# Patient Record
Sex: MALE | Race: WHITE | NOT HISPANIC OR LATINO | Employment: OTHER | URBAN - METROPOLITAN AREA
[De-identification: names, ages, dates, MRNs, and addresses within clinical notes are randomized per-mention and may not be internally consistent; named-entity substitution may affect disease eponyms.]

---

## 2018-03-27 ENCOUNTER — HOSPITAL ENCOUNTER (INPATIENT)
Facility: HOSPITAL | Age: 43
LOS: 7 days | Discharge: PSYCHIATRIC OR SUBSTANCE USE FACILITY | DRG: 917 | End: 2018-04-03
Attending: INTERNAL MEDICINE | Admitting: INTERNAL MEDICINE
Payer: MEDICAID

## 2018-03-27 DIAGNOSIS — B18.2 CHRONIC HEPATITIS C WITHOUT HEPATIC COMA: ICD-10-CM

## 2018-03-27 DIAGNOSIS — R56.9 SEIZURES: ICD-10-CM

## 2018-03-27 DIAGNOSIS — E87.20 METABOLIC ACIDOSIS: ICD-10-CM

## 2018-03-27 DIAGNOSIS — A41.9 SEVERE SEPSIS: ICD-10-CM

## 2018-03-27 DIAGNOSIS — R41.82 ALTERED MENTAL STATUS, UNSPECIFIED ALTERED MENTAL STATUS TYPE: ICD-10-CM

## 2018-03-27 DIAGNOSIS — J96.00 ACUTE RESPIRATORY FAILURE, UNSPECIFIED WHETHER WITH HYPOXIA OR HYPERCAPNIA: ICD-10-CM

## 2018-03-27 DIAGNOSIS — F19.10 POLYSUBSTANCE ABUSE: ICD-10-CM

## 2018-03-27 DIAGNOSIS — R41.82 ALTERED MENTAL STATUS: ICD-10-CM

## 2018-03-27 DIAGNOSIS — R65.20 SEVERE SEPSIS: ICD-10-CM

## 2018-03-27 DIAGNOSIS — F31.9 BIPOLAR AFFECTIVE DISORDER, REMISSION STATUS UNSPECIFIED: Primary | ICD-10-CM

## 2018-03-27 DIAGNOSIS — R41.89 UNRESPONSIVE: ICD-10-CM

## 2018-03-27 LAB
ALBUMIN SERPL BCP-MCNC: 3.3 G/DL
ALLENS TEST: ABNORMAL
ALP SERPL-CCNC: 61 U/L
ALT SERPL W/O P-5'-P-CCNC: 22 U/L
AMMONIA PLAS-SCNC: 48 UMOL/L
ANION GAP SERPL CALC-SCNC: 9 MMOL/L
AST SERPL-CCNC: 35 U/L
BASOPHILS # BLD AUTO: ABNORMAL K/UL
BASOPHILS NFR BLD: 0 %
BILIRUB SERPL-MCNC: 2.7 MG/DL
BUN SERPL-MCNC: 8 MG/DL
CALCIUM SERPL-MCNC: 8.2 MG/DL
CHLORIDE SERPL-SCNC: 112 MMOL/L
CO2 SERPL-SCNC: 17 MMOL/L
CREAT SERPL-MCNC: 0.8 MG/DL
DELSYS: ABNORMAL
DIFFERENTIAL METHOD: ABNORMAL
EOSINOPHIL # BLD AUTO: ABNORMAL K/UL
EOSINOPHIL NFR BLD: 1 %
ERYTHROCYTE [DISTWIDTH] IN BLOOD BY AUTOMATED COUNT: 13.8 %
ERYTHROCYTE [SEDIMENTATION RATE] IN BLOOD BY WESTERGREN METHOD: 24 MM/H
EST. GFR  (AFRICAN AMERICAN): >60 ML/MIN/1.73 M^2
EST. GFR  (NON AFRICAN AMERICAN): >60 ML/MIN/1.73 M^2
ETCO2: 23
FIO2: 100
GLUCOSE SERPL-MCNC: 107 MG/DL
HCO3 UR-SCNC: 23 MMOL/L (ref 24–28)
HCT VFR BLD AUTO: 40.2 %
HGB BLD-MCNC: 13.9 G/DL
LACTATE SERPL-SCNC: 1.3 MMOL/L
LIPASE SERPL-CCNC: 31 U/L
LYMPHOCYTES # BLD AUTO: ABNORMAL K/UL
LYMPHOCYTES NFR BLD: 10 %
MCH RBC QN AUTO: 30 PG
MCHC RBC AUTO-ENTMCNC: 34.7 G/DL
MCV RBC AUTO: 87 FL
MIN VOL: 12.1
MODE: ABNORMAL
MONOCYTES # BLD AUTO: ABNORMAL K/UL
MONOCYTES NFR BLD: 4 %
NEUTROPHILS NFR BLD: 82 %
NEUTS BAND NFR BLD MANUAL: 3 %
OVALOCYTES BLD QL SMEAR: ABNORMAL
PCO2 BLDA: 35.3 MMHG (ref 35–45)
PEEP: 5
PH SMN: 7.42 [PH] (ref 7.35–7.45)
PIP: 23
PLATELET # BLD AUTO: 113 K/UL
PLATELET BLD QL SMEAR: ABNORMAL
PMV BLD AUTO: 8.9 FL
PO2 BLDA: 611 MMHG (ref 80–100)
POC BE: -1 MMOL/L
POC SATURATED O2: 100 % (ref 95–100)
POC TCO2: 24 MMOL/L (ref 23–27)
POIKILOCYTOSIS BLD QL SMEAR: SLIGHT
POTASSIUM SERPL-SCNC: 3.3 MMOL/L
PROT SERPL-MCNC: 6.2 G/DL
RBC # BLD AUTO: 4.64 M/UL
SAMPLE: ABNORMAL
SITE: ABNORMAL
SODIUM SERPL-SCNC: 138 MMOL/L
SP02: 100
TSH SERPL DL<=0.005 MIU/L-ACNC: 0.58 UIU/ML
VT: 500
WBC # BLD AUTO: 18.8 K/UL

## 2018-03-27 PROCEDURE — 94002 VENT MGMT INPAT INIT DAY: CPT

## 2018-03-27 PROCEDURE — 36600 WITHDRAWAL OF ARTERIAL BLOOD: CPT

## 2018-03-27 PROCEDURE — 82553 CREATINE MB FRACTION: CPT

## 2018-03-27 PROCEDURE — 86703 HIV-1/HIV-2 1 RESULT ANTBDY: CPT

## 2018-03-27 PROCEDURE — 83690 ASSAY OF LIPASE: CPT

## 2018-03-27 PROCEDURE — 36415 COLL VENOUS BLD VENIPUNCTURE: CPT

## 2018-03-27 PROCEDURE — 82803 BLOOD GASES ANY COMBINATION: CPT

## 2018-03-27 PROCEDURE — 83036 HEMOGLOBIN GLYCOSYLATED A1C: CPT

## 2018-03-27 PROCEDURE — 25000003 PHARM REV CODE 250: Performed by: INTERNAL MEDICINE

## 2018-03-27 PROCEDURE — 94761 N-INVAS EAR/PLS OXIMETRY MLT: CPT

## 2018-03-27 PROCEDURE — 84443 ASSAY THYROID STIM HORMONE: CPT

## 2018-03-27 PROCEDURE — 84145 PROCALCITONIN (PCT): CPT

## 2018-03-27 PROCEDURE — 85027 COMPLETE CBC AUTOMATED: CPT

## 2018-03-27 PROCEDURE — 80053 COMPREHEN METABOLIC PANEL: CPT

## 2018-03-27 PROCEDURE — 99223 1ST HOSP IP/OBS HIGH 75: CPT | Mod: ,,, | Performed by: INTERNAL MEDICINE

## 2018-03-27 PROCEDURE — 85007 BL SMEAR W/DIFF WBC COUNT: CPT

## 2018-03-27 PROCEDURE — 27000221 HC OXYGEN, UP TO 24 HOURS

## 2018-03-27 PROCEDURE — 94770 HC EXHALED C02 TEST: CPT

## 2018-03-27 PROCEDURE — 99900035 HC TECH TIME PER 15 MIN (STAT)

## 2018-03-27 PROCEDURE — 84484 ASSAY OF TROPONIN QUANT: CPT

## 2018-03-27 PROCEDURE — S5010 5% DEXTROSE AND 0.45% SALINE: HCPCS | Performed by: INTERNAL MEDICINE

## 2018-03-27 PROCEDURE — 63600175 PHARM REV CODE 636 W HCPCS: Performed by: INTERNAL MEDICINE

## 2018-03-27 PROCEDURE — 5A1935Z RESPIRATORY VENTILATION, LESS THAN 24 CONSECUTIVE HOURS: ICD-10-PCS | Performed by: INTERNAL MEDICINE

## 2018-03-27 PROCEDURE — 80074 ACUTE HEPATITIS PANEL: CPT

## 2018-03-27 PROCEDURE — 83605 ASSAY OF LACTIC ACID: CPT

## 2018-03-27 PROCEDURE — 20000000 HC ICU ROOM

## 2018-03-27 PROCEDURE — 63600175 PHARM REV CODE 636 W HCPCS

## 2018-03-27 PROCEDURE — 82140 ASSAY OF AMMONIA: CPT

## 2018-03-27 RX ORDER — IBUPROFEN 200 MG
16 TABLET ORAL
Status: DISCONTINUED | OUTPATIENT
Start: 2018-03-27 | End: 2018-04-03 | Stop reason: HOSPADM

## 2018-03-27 RX ORDER — TRAZODONE HYDROCHLORIDE 50 MG/1
50 TABLET ORAL NIGHTLY
COMMUNITY
End: 2023-09-26

## 2018-03-27 RX ORDER — ENOXAPARIN SODIUM 100 MG/ML
40 INJECTION SUBCUTANEOUS EVERY 24 HOURS
Status: DISCONTINUED | OUTPATIENT
Start: 2018-03-27 | End: 2018-04-03 | Stop reason: HOSPADM

## 2018-03-27 RX ORDER — LEVETIRACETAM 5 MG/ML
500 INJECTION INTRAVASCULAR EVERY 12 HOURS
Status: DISCONTINUED | OUTPATIENT
Start: 2018-03-28 | End: 2018-03-29

## 2018-03-27 RX ORDER — DEXTROSE MONOHYDRATE AND SODIUM CHLORIDE 5; .45 G/100ML; G/100ML
INJECTION, SOLUTION INTRAVENOUS CONTINUOUS
Status: DISCONTINUED | OUTPATIENT
Start: 2018-03-27 | End: 2018-03-29

## 2018-03-27 RX ORDER — SERTRALINE HYDROCHLORIDE 100 MG/1
100 TABLET, FILM COATED ORAL DAILY
COMMUNITY
End: 2023-09-26

## 2018-03-27 RX ORDER — ENOXAPARIN SODIUM 100 MG/ML
40 INJECTION SUBCUTANEOUS EVERY 24 HOURS
Status: CANCELLED | OUTPATIENT
Start: 2018-03-28

## 2018-03-27 RX ORDER — BUPROPION HYDROCHLORIDE 300 MG/1
300 TABLET ORAL DAILY
COMMUNITY
End: 2023-09-26

## 2018-03-27 RX ORDER — PROPOFOL 10 MG/ML
5 INJECTION, EMULSION INTRAVENOUS CONTINUOUS PRN
Status: DISCONTINUED | OUTPATIENT
Start: 2018-03-27 | End: 2018-03-29

## 2018-03-27 RX ORDER — ACETAMINOPHEN 650 MG/1
650 SUPPOSITORY RECTAL EVERY 4 HOURS PRN
Status: DISCONTINUED | OUTPATIENT
Start: 2018-03-27 | End: 2018-03-28

## 2018-03-27 RX ORDER — PANTOPRAZOLE SODIUM 40 MG/10ML
40 INJECTION, POWDER, LYOPHILIZED, FOR SOLUTION INTRAVENOUS DAILY
Status: DISCONTINUED | OUTPATIENT
Start: 2018-03-28 | End: 2018-04-03

## 2018-03-27 RX ORDER — IBUPROFEN 200 MG
24 TABLET ORAL
Status: DISCONTINUED | OUTPATIENT
Start: 2018-03-27 | End: 2018-04-03 | Stop reason: HOSPADM

## 2018-03-27 RX ORDER — PROPOFOL 10 MG/ML
INJECTION, EMULSION INTRAVENOUS
Status: COMPLETED
Start: 2018-03-27 | End: 2018-03-27

## 2018-03-27 RX ORDER — GLUCAGON 1 MG
1 KIT INJECTION
Status: DISCONTINUED | OUTPATIENT
Start: 2018-03-27 | End: 2018-04-03 | Stop reason: HOSPADM

## 2018-03-27 RX ADMIN — ENOXAPARIN SODIUM 40 MG: 100 INJECTION SUBCUTANEOUS at 07:03

## 2018-03-27 RX ADMIN — DEXTROSE AND SODIUM CHLORIDE: 5; .45 INJECTION, SOLUTION INTRAVENOUS at 08:03

## 2018-03-27 RX ADMIN — PIPERACILLIN SODIUM AND TAZOBACTAM SODIUM 4.5 G: 4; .5 INJECTION, POWDER, FOR SOLUTION INTRAVENOUS at 06:03

## 2018-03-27 RX ADMIN — PROPOFOL 5 MCG/KG/MIN: 10 INJECTION, EMULSION INTRAVENOUS at 07:03

## 2018-03-27 NOTE — H&P
PCP: No primary care provider on file.    History & Physical    Chief Complaint: Unresponsiveness, on ventilatory support    History of Present Illness:  Patient is a 42 y.o. male admitted to Hospitalist Service from [facility] Emergency Room with complaint of unresponsive and on ventilator support. H is on ICU diversion. Patient reportedly has past medical history significant for Bipolar disorder, Chronic hepatitis C, multiple psych hospitalization and Polysubstance abuse. Part of the history obtained from patient's mother who lives next door to the patient. According to the mother, she was patient coming out of shed with device made of Sprite bottle which she suspects used for smoking illicit substance. She knows he reqularly smokes Weed but also suspects could be anything. Patient appeared confused, his eyes rolled back and strange sound came from his mouth and feel on the floor and started to seize. Full body shaking reported, foaming at mouth with urinary incontinence. No prior history of seizure reported. This lasted few seconds and patient woke up prior to arrival of EMS. Patient received 5 mg Versed IV and nasally intubated. Prior to transfer patient underwent CTA of head and neck at directions by Dr. Arias. I requested 2 ampules of sodium bicarbonate to be given prior to transfer which reportedly not given. Further details are not available.      PMH: Bipolar disorder, Chronic hepatitis C, multiple psych hospitalization and Polysubstance abuse    No past surgical history on file.  No family history on file.  Social History   Substance Use Topics    Smoking status: Not on file    Smokeless tobacco: Not on file    Alcohol use Not on file      Review of patient's allergies indicates:  Allergies not on file  No prescriptions prior to admission.     Review of Systems: Unable to obtain due to patient's present medical condition.     OBJECTIVE:     Vital Signs (Most Recent)      Vitals:     18 1759   BP: (!) 119/59   Pulse: 93   Resp: (!) 24   Temp: 99 °F (37.2 °C)       Physical Exam:  General appearance: well developed, appears stated age, on ventilator  Head: normocephalic, atraumatic  Eyes:  conjunctivae/corneas clear. PERRL.  Nose: Nares normal. Septum midline.  Throat: lips, mucosa, and tongue normal; teeth and gums normal, no throat erythema.  Neck: supple, symmetrical, trachea midline, no JVD and thyroid not enlarged, symmetric, no tenderness/mass/nodules  Lungs:  clear to auscultation bilaterally and normal respiratory effort  Chest wall: no tenderness  Heart: regular rate and rhythm, S1, S2 normal, no murmur, click, rub or gallop  Abdomen: soft, non-tender non-distented; bowel sounds normal; no masses,  no organomegaly  Extremities: no cyanosis, clubbing or edema.   Pulses: 2+ and symmetric  Skin: Skin color, texture, turgor normal. No rashes or lesions.  Lymph nodes: Cervical, supraclavicular, and axillary nodes normal.  Neurologic: Sedated    Laboratory:   Labs from [facility] reviewed.   Significant labs include:  , WBC 33.7, HgB 17, Plt 279  Glu: 187, Cr 1.3, Alb 5.1, T. Js 4.0, A. PO4 82, AST 58. HCO 7  Na 137, K 3.4, ALT 34, INR 1.6    UDS: Positive THC and Benzo    AB.17/26/581/9.5    Lactate: 5.8    No results found for: HGBA1C  Microbiology Results (last 7 days)     ** No results found for the last 168 hours. **        Diagnostic Results:  Chest X-Ray: No acute pulmonary infiltrate reported on [facility] records.    CT Head: No acute intra-cranial process.    CT C-spine: Multi-level DJD changes, no acute osseous abnormality.    CTA of head and neck:Mild bilateral atheromatous changes without findings of significant luminal stenosis. No significant abnormality on CTA of brain.    EKG: NSR, 96, Prolonged cQT 485, no acute changes.    Assessment/Plan:     Active Hospital Problems    Diagnosis  POA    *Unresponsive [R41.89]  Yes    Altered mental status [R41.82]  Polysubstance abuse  [F19.10] - unknown substance, THC abuse  Continue mechanical ventilation. Consult pulmonary medicine for ventilator management.  Obtain ABG and CXR now.  Continue beta 2 agonist bronchodilator treatments.   Continue IV antibiotics - Zosyn.  Check sputum GS and Cx.   Neurochecks    Metabolic acidosis [E87.2] - related to sepsis Vs seizure activity  Severe sepsis [A41.9, R65.20]  Continue IVF hydration with sodium bicarbonate supplementation.  Follow stat labs. Trend Lactic acid.  Follow microbiology.  Start Empiric IV Zosyn.     Yes    Bipolar affective disorder [F31.9]  May need Psych evaluation. Patient has had multiple prior psych hospitalization.    Yes    Seizures [R56.9]  Continue Keppra 500 mg IV q 12 hrs.  Obtain EEG and consult Dr. Hunt.  Seizure and fall precautions.    Yes          Chronic hepatitis C [B18.2]  Trend LFTs and check ammonia level.  Yes               Discussed with patient's mother and aunt in detail and answered all the questions.      VTE Risk Mitigation         Ordered     enoxaparin injection 40 mg  Daily     Route:  Subcutaneous        03/27/18 1808     IP VTE HIGH RISK PATIENT  Once      03/27/18 1808        Oly Santos MD  Department of Hospital Medicine   [facility]

## 2018-03-28 PROBLEM — J96.00 ACUTE RESPIRATORY FAILURE: Status: ACTIVE | Noted: 2018-03-28

## 2018-03-28 LAB
ALBUMIN SERPL BCP-MCNC: 3.1 G/DL
ALP SERPL-CCNC: 56 U/L
ALT SERPL W/O P-5'-P-CCNC: 21 U/L
ANION GAP SERPL CALC-SCNC: 10 MMOL/L
AST SERPL-CCNC: 34 U/L
BASOPHILS # BLD AUTO: 0.1 K/UL
BASOPHILS NFR BLD: 0.6 %
BILIRUB SERPL-MCNC: 2.1 MG/DL
BILIRUB UR QL STRIP: ABNORMAL
BUN SERPL-MCNC: 5 MG/DL
CALCIUM SERPL-MCNC: 8.2 MG/DL
CHLORIDE SERPL-SCNC: 112 MMOL/L
CHOLEST SERPL-MCNC: 109 MG/DL
CHOLEST/HDLC SERPL: 5.5 {RATIO}
CK MB SERPL-MCNC: 16 NG/ML
CK MB SERPL-MCNC: 3 NG/ML
CK MB SERPL-MCNC: 7.5 NG/ML
CK MB SERPL-RTO: 1.1 %
CK MB SERPL-RTO: 1.2 %
CK MB SERPL-RTO: 1.4 %
CK SERPL-CCNC: 1433 U/L
CK SERPL-CCNC: 249 U/L
CK SERPL-CCNC: 526 U/L
CLARITY UR: CLEAR
CO2 SERPL-SCNC: 18 MMOL/L
COLOR UR: YELLOW
CREAT SERPL-MCNC: 0.9 MG/DL
DIFFERENTIAL METHOD: ABNORMAL
EOSINOPHIL # BLD AUTO: 0.1 K/UL
EOSINOPHIL NFR BLD: 1.2 %
ERYTHROCYTE [DISTWIDTH] IN BLOOD BY AUTOMATED COUNT: 14 %
EST. GFR  (AFRICAN AMERICAN): >60 ML/MIN/1.73 M^2
EST. GFR  (NON AFRICAN AMERICAN): >60 ML/MIN/1.73 M^2
ESTIMATED AVG GLUCOSE: 82 MG/DL
GLUCOSE SERPL-MCNC: 122 MG/DL
GLUCOSE UR QL STRIP: NEGATIVE
HAV IGM SERPL QL IA: NEGATIVE
HBA1C MFR BLD HPLC: 4.5 %
HBV CORE IGM SERPL QL IA: NEGATIVE
HBV SURFACE AG SERPL QL IA: NEGATIVE
HCT VFR BLD AUTO: 38.7 %
HCV AB SERPL QL IA: POSITIVE
HDLC SERPL-MCNC: 20 MG/DL
HDLC SERPL: 18.3 %
HGB BLD-MCNC: 13.4 G/DL
HGB UR QL STRIP: NEGATIVE
HIV1+2 IGG SERPL QL IA.RAPID: NEGATIVE
INR PPP: 1.2
KETONES UR QL STRIP: NEGATIVE
LDLC SERPL CALC-MCNC: 73.8 MG/DL
LEUKOCYTE ESTERASE UR QL STRIP: NEGATIVE
LYMPHOCYTES # BLD AUTO: 2.5 K/UL
LYMPHOCYTES NFR BLD: 21.1 %
MAGNESIUM SERPL-MCNC: 2.5 MG/DL
MCH RBC QN AUTO: 30 PG
MCHC RBC AUTO-ENTMCNC: 34.6 G/DL
MCV RBC AUTO: 87 FL
MONOCYTES # BLD AUTO: 0.9 K/UL
MONOCYTES NFR BLD: 7.4 %
NEUTROPHILS # BLD AUTO: 8.4 K/UL
NEUTROPHILS NFR BLD: 69.7 %
NITRITE UR QL STRIP: NEGATIVE
NONHDLC SERPL-MCNC: 89 MG/DL
PH UR STRIP: 7 [PH] (ref 5–8)
PHOSPHATE SERPL-MCNC: 2.3 MG/DL
PLATELET # BLD AUTO: 105 K/UL
PMV BLD AUTO: 9.2 FL
POTASSIUM SERPL-SCNC: 2.8 MMOL/L
PROCALCITONIN SERPL IA-MCNC: 0.29 NG/ML
PROT SERPL-MCNC: 6 G/DL
PROT UR QL STRIP: ABNORMAL
PROTHROMBIN TIME: 12.3 SEC
RBC # BLD AUTO: 4.46 M/UL
SODIUM SERPL-SCNC: 140 MMOL/L
SP GR UR STRIP: 1.01 (ref 1–1.03)
TRIGL SERPL-MCNC: 76 MG/DL
TROPONIN I SERPL DL<=0.01 NG/ML-MCNC: 0.01 NG/ML
TROPONIN I SERPL DL<=0.01 NG/ML-MCNC: 0.01 NG/ML
TROPONIN I SERPL DL<=0.01 NG/ML-MCNC: 0.03 NG/ML
URN SPEC COLLECT METH UR: ABNORMAL
UROBILINOGEN UR STRIP-ACNC: 1 EU/DL
WBC # BLD AUTO: 12 K/UL

## 2018-03-28 PROCEDURE — 99900035 HC TECH TIME PER 15 MIN (STAT)

## 2018-03-28 PROCEDURE — 85025 COMPLETE CBC W/AUTO DIFF WBC: CPT

## 2018-03-28 PROCEDURE — 25000003 PHARM REV CODE 250: Performed by: INTERNAL MEDICINE

## 2018-03-28 PROCEDURE — 80053 COMPREHEN METABOLIC PANEL: CPT

## 2018-03-28 PROCEDURE — 83735 ASSAY OF MAGNESIUM: CPT

## 2018-03-28 PROCEDURE — 84100 ASSAY OF PHOSPHORUS: CPT

## 2018-03-28 PROCEDURE — 20000000 HC ICU ROOM

## 2018-03-28 PROCEDURE — C9113 INJ PANTOPRAZOLE SODIUM, VIA: HCPCS | Performed by: INTERNAL MEDICINE

## 2018-03-28 PROCEDURE — 27000221 HC OXYGEN, UP TO 24 HOURS

## 2018-03-28 PROCEDURE — 99233 SBSQ HOSP IP/OBS HIGH 50: CPT | Mod: ,,, | Performed by: PSYCHIATRY & NEUROLOGY

## 2018-03-28 PROCEDURE — S5010 5% DEXTROSE AND 0.45% SALINE: HCPCS | Performed by: INTERNAL MEDICINE

## 2018-03-28 PROCEDURE — 63600175 PHARM REV CODE 636 W HCPCS: Performed by: INTERNAL MEDICINE

## 2018-03-28 PROCEDURE — 36415 COLL VENOUS BLD VENIPUNCTURE: CPT

## 2018-03-28 PROCEDURE — S4991 NICOTINE PATCH NONLEGEND: HCPCS | Performed by: INTERNAL MEDICINE

## 2018-03-28 PROCEDURE — 94770 HC EXHALED C02 TEST: CPT

## 2018-03-28 PROCEDURE — 82553 CREATINE MB FRACTION: CPT

## 2018-03-28 PROCEDURE — 99233 SBSQ HOSP IP/OBS HIGH 50: CPT | Mod: ,,, | Performed by: INTERNAL MEDICINE

## 2018-03-28 PROCEDURE — 63600175 PHARM REV CODE 636 W HCPCS

## 2018-03-28 PROCEDURE — 85610 PROTHROMBIN TIME: CPT

## 2018-03-28 PROCEDURE — 94003 VENT MGMT INPAT SUBQ DAY: CPT

## 2018-03-28 PROCEDURE — 84484 ASSAY OF TROPONIN QUANT: CPT

## 2018-03-28 PROCEDURE — 94761 N-INVAS EAR/PLS OXIMETRY MLT: CPT

## 2018-03-28 PROCEDURE — 81003 URINALYSIS AUTO W/O SCOPE: CPT

## 2018-03-28 PROCEDURE — 95819 EEG AWAKE AND ASLEEP: CPT | Mod: 26,,, | Performed by: PSYCHIATRY & NEUROLOGY

## 2018-03-28 PROCEDURE — 80061 LIPID PANEL: CPT

## 2018-03-28 RX ORDER — IBUPROFEN 200 MG
1 TABLET ORAL DAILY
Status: DISCONTINUED | OUTPATIENT
Start: 2018-03-28 | End: 2018-04-03 | Stop reason: HOSPADM

## 2018-03-28 RX ORDER — LORAZEPAM 2 MG/ML
1 INJECTION INTRAMUSCULAR EVERY 4 HOURS PRN
Status: DISCONTINUED | OUTPATIENT
Start: 2018-03-28 | End: 2018-04-03 | Stop reason: HOSPADM

## 2018-03-28 RX ORDER — LORAZEPAM 2 MG/ML
INJECTION INTRAMUSCULAR
Status: COMPLETED
Start: 2018-03-28 | End: 2018-03-28

## 2018-03-28 RX ORDER — ACETAMINOPHEN 325 MG/1
650 TABLET ORAL EVERY 6 HOURS PRN
Status: DISCONTINUED | OUTPATIENT
Start: 2018-03-28 | End: 2018-04-03 | Stop reason: HOSPADM

## 2018-03-28 RX ORDER — HYDROMORPHONE HYDROCHLORIDE 2 MG/ML
INJECTION, SOLUTION INTRAMUSCULAR; INTRAVENOUS; SUBCUTANEOUS
Status: COMPLETED
Start: 2018-03-28 | End: 2018-03-28

## 2018-03-28 RX ORDER — HYDROMORPHONE HYDROCHLORIDE 2 MG/ML
1 INJECTION, SOLUTION INTRAMUSCULAR; INTRAVENOUS; SUBCUTANEOUS EVERY 4 HOURS PRN
Status: DISCONTINUED | OUTPATIENT
Start: 2018-03-28 | End: 2018-04-03

## 2018-03-28 RX ADMIN — PIPERACILLIN SODIUM AND TAZOBACTAM SODIUM 4.5 G: 4; .5 INJECTION, POWDER, FOR SOLUTION INTRAVENOUS at 07:03

## 2018-03-28 RX ADMIN — PIPERACILLIN SODIUM AND TAZOBACTAM SODIUM 4.5 G: 4; .5 INJECTION, POWDER, FOR SOLUTION INTRAVENOUS at 03:03

## 2018-03-28 RX ADMIN — LORAZEPAM 1 MG: 2 INJECTION INTRAMUSCULAR; INTRAVENOUS at 05:03

## 2018-03-28 RX ADMIN — PROPOFOL 50 MCG/KG/MIN: 10 INJECTION, EMULSION INTRAVENOUS at 12:03

## 2018-03-28 RX ADMIN — NICOTINE 1 PATCH: 14 PATCH, EXTENDED RELEASE TRANSDERMAL at 04:03

## 2018-03-28 RX ADMIN — POTASSIUM PHOSPHATE, MONOBASIC AND POTASSIUM PHOSPHATE, DIBASIC 30 MMOL: 224; 236 INJECTION, SOLUTION, CONCENTRATE INTRAVENOUS at 11:03

## 2018-03-28 RX ADMIN — PANTOPRAZOLE SODIUM 40 MG: 40 INJECTION, POWDER, FOR SOLUTION INTRAVENOUS at 09:03

## 2018-03-28 RX ADMIN — HYDROMORPHONE HYDROCHLORIDE 1 MG: 2 INJECTION, SOLUTION INTRAMUSCULAR; INTRAVENOUS; SUBCUTANEOUS at 05:03

## 2018-03-28 RX ADMIN — LEVETIRACETAM 500 MG: 5 INJECTION INTRAVENOUS at 09:03

## 2018-03-28 RX ADMIN — ACETAMINOPHEN 650 MG: 325 TABLET ORAL at 05:03

## 2018-03-28 RX ADMIN — HYDROMORPHONE HYDROCHLORIDE 1 MG: 2 INJECTION INTRAMUSCULAR; INTRAVENOUS; SUBCUTANEOUS at 05:03

## 2018-03-28 RX ADMIN — DEXTROSE AND SODIUM CHLORIDE: 5; .45 INJECTION, SOLUTION INTRAVENOUS at 04:03

## 2018-03-28 RX ADMIN — PIPERACILLIN SODIUM AND TAZOBACTAM SODIUM 4.5 G: 4; .5 INJECTION, POWDER, FOR SOLUTION INTRAVENOUS at 11:03

## 2018-03-28 RX ADMIN — PROPOFOL 50 MCG/KG/MIN: 10 INJECTION, EMULSION INTRAVENOUS at 04:03

## 2018-03-28 RX ADMIN — ENOXAPARIN SODIUM 40 MG: 100 INJECTION SUBCUTANEOUS at 04:03

## 2018-03-28 RX ADMIN — LORAZEPAM 1 MG: 2 INJECTION, SOLUTION INTRAMUSCULAR; INTRAVENOUS at 05:03

## 2018-03-28 NOTE — PLAN OF CARE
03/28/18 0830   PRE-TX-O2-ETCO2   O2 Device (Oxygen Therapy) nasal cannula   $ Is the patient on Low Flow Oxygen? Yes   Flow (L/min) 3   Oxygen Concentration (%) 32   SpO2 99 %   Pulse Oximetry Type Continuous   ETCO2 (mmHg) 1 mmHg   Pulse 86   Resp (!) 26   /81   Patient Ventilator Parameters   Resp Rate Total 12 br/min   Ready to Wean/Extubation Screen   FIO2<=50 (chart decimal) 0.32

## 2018-03-28 NOTE — PLAN OF CARE
03/28/18 0800   PRE-TX-O2-ETCO2   Oxygen Concentration (%) 30   SpO2 100 %   ETCO2 (mmHg) 23 mmHg   Pulse 75   Resp (!) 23   BP (!) 110/58   Vent Select   Charged w/in last 24h YES   Preset Conventional Ventilator Settings   Ventilation Type VC   Vent Mode A/C   Set Rate 24 bmp   Vt Set 500 mL   PEEP/CPAP 5 cmH20   Pressure Support 0 cmH20   Waveform RAMP   Peak Flow 60 L/min   Set Inspiratory Pressure 0 cmH20   Insp Time 0 Sec(s)   Plateau Set/Insp. Hold (sec) 0   Insp Rise Time  0 %   Trigger Sensitivity Flow/I-Trigger 2 L/min   P High 0 cm H2O   P Low 0 cm H2O   T High 0 sec   T Low 0 sec   Patient Ventilator Parameters   Resp Rate Total 24 br/min   Peak Airway Pressure 26 cmH2O   Mean Airway Pressure 12 cmH20   Plateau Pressure 0 cmH20   Exhaled Vt 494 mL   Total Ve 11.9 mL   Spont Ve 0 L   I:E Ratio Measured 1:1.80   Conventional Ventilator Alarms   Ve High Alarm 22 L/min   Resp Rate High Alarm 0 br/min   Press High Alarm 40 cmH2O   Apnea Rate 10   Apnea Volume (mL) 870 mL   Apnea Oxygen Concentration  100   Apnea Flow Rate (L/min) 104   T Apnea 20 sec(s)   Ready to Wean/Extubation Screen   FIO2<=50 (chart decimal) 0.3   MV<16L (chart vol.) 11.9   PEEP <=8 (chart #) 5   Ready to Wean Parameters   F/VT Ratio<105 (RSBI) (!) 46.56   Extubated? Yes   Ventilator Discontinued Yes

## 2018-03-28 NOTE — PROGRESS NOTES
Bruise to patient left side at the ribs and spread to left hip, is painful for patient to repositions self. Dr. Sultan daugherty.

## 2018-03-28 NOTE — PLAN OF CARE
03/28/18 0721   Patient Assessment/Suction   All Lung Fields Breath Sounds clear   PRE-TX-O2-ETCO2   O2 Device (Oxygen Therapy) ventilator   $ Is the patient on Low Flow Oxygen? Yes   Oxygen Concentration (%) 30   SpO2 100 %   Pulse Oximetry Type Continuous   $ Pulse Oximetry - Multiple Charge Pulse Oximetry - Multiple   ETCO2 (mmHg) 22 mmHg   Pulse 69   Resp (!) 24       Airway - Non-Surgical Endotracheal Tube   No Placement Date or Time found.   Present Prior to Hospital Arrival?: Yes  Airway Device: Endotracheal Tube  Airway Device Size: 7.5  Style: Cuffed  Cuff Inflation: Minimal occlusive pressure  Cuff Inflated With: Air  Inflation Amount (mL): 32  Place...   Secured at 25 cm   Measured At Lips   Secured Location Center   Secured by Commercial tube pepper   Bite Block right   Site Condition Dry;Cool   Status Secured;Intact   Site Assessment Clean;Dry   Cuff Pressure 30 cm H2O   Vent Select   $ Ventilator Subsequent 1   Charged w/in last 24h YES   Preset Conventional Ventilator Settings   Vent Type    Ventilation Type VC   Vent Mode A/C   Set Rate 24 bmp   Vt Set 500 mL   PEEP/CPAP 5 cmH20   Pressure Support 0 cmH20   Waveform RAMP   Peak Flow 60 L/min   Set Inspiratory Pressure 0 cmH20   Insp Time 0 Sec(s)   Plateau Set/Insp. Hold (sec) 0   Insp Rise Time  0 %   Trigger Sensitivity Flow/I-Trigger 2 L/min   P High 0 cm H2O   P Low 0 cm H2O   T High 0 sec   T Low 0 sec   Patient Ventilator Parameters   Resp Rate Total 24 br/min   Peak Airway Pressure 25 cmH2O   Mean Airway Pressure 12 cmH20   Plateau Pressure 0 cmH20   Exhaled Vt 501 mL   Total Ve 12 mL   Spont Ve 0 L   I:E Ratio Measured 1:1.80   Conventional Ventilator Alarms   Ve High Alarm 22 L/min   Resp Rate High Alarm 0 br/min   Press High Alarm 40 cmH2O   Apnea Rate 10   Apnea Volume (mL) 870 mL   Apnea Oxygen Concentration  100   Apnea Flow Rate (L/min) 104   T Apnea 20 sec(s)   Ready to Wean/Extubation Screen   FIO2<=50 (chart decimal) 0.3    MV<16L (chart vol.) 12   PEEP <=8 (chart #) 5   Ready to Wean Parameters   F/VT Ratio<105 (RSBI) (!) 47.9

## 2018-03-28 NOTE — PROCEDURES
ELECTROENCEPHALOGRAM REPORT    EEG NUMBER:  ON-.    REFERRING PHYSICIAN:  Dr. Santos.    CLINICAL INDICATION:  This EEG was performed to assess for subclinical seizures.    METHODOLOGY:  Electroencephalographic (EEG) recording is recorded with   electrodes placed according to the International 10-20 placement system.  Thirty   two (32) channels of digital signal (sampling rate of 512/sec), including T1   and T2, were simultaneously recorded from the scalp and may include EKG, EMG,   and/or eye monitors.  Recording band pass was 0.1 to 512 Hz.  Digital video   recording of the patient is simultaneously recorded with the EEG.  The patient   is instructed to report clinical symptoms which may occur during the recording   session.  EEG and video recording are stored and archived in digital format.    Activation procedures, which include photic stimulation, hyperventilation and   instructing patients to perform simple tasks, are done in selected patients.  The EEG is displayed on a monitor screen and can be reviewed using different   montages.  Computer assisted-analysis is employed to detect spike and   electrographic seizure activity.   The entire record is submitted for computer   analysis.  The entire recording is visually reviewed, and the times identified   by computer analysis as being spikes or seizures are reviewed again.  Compressed spectral analysis (CSA) is also performed on the activity recorded   from each individual channel.  This is displayed as a power display of   frequencies from 0 to 30 Hz over time.   The CSA is reviewed looking for   asymmetries in power between homologous areas of the scalp, then compared with   the original EEG recording.  [employer] software was also utilized in the review of this study.  This software   suite analyzes the EEG recording in multiple domains.  Coherence and rhythmicity   are computed to identify EEG sections which may contain organized seizures.    Each channel  undergoes analysis to detect the presence of spike and sharp waves   which have special and morphological characteristics of epileptic activity.  The   routine EEG recording is converted from special into frequency domain.  This is   then displayed comparing homologous areas to identify areas of significant   asymmetry.  Algorithm to identify non-cortically generated artifact is used to   separate artifact from the EEG.    EEG FINDINGS:  The recording was obtained with a number of standard bipolar and   referential montages during wakefulness, drowsiness and sleep.  In the alert   state, the posterior background rhythm was a symmetric, well-modulated 10 to 11   Hz alpha rhythm, which reacted symmetrically to eye opening.  Excessive beta   range activity was noted throughout the record, which was diffuse.  Intermittent   photic stimulation evoked symmetric posterior driving responses.  No   abnormalities were activated by photic stimulation.  Hyperventilation was not   performed.  During drowsiness, the background rhythm waxed and waned and there   were periods of slowing.  Intermittent right posterotemporal focal polymorphic   delta range slowing was noted.  During stage II sleep, symmetric V waves, K   complexes and sleep spindles were noted.  There were no interictal epileptiform   abnormalities and no clinical or electrographic seizures were recorded.    The EKG channel revealed sinus rhythm.    IMPRESSION:  This is an abnormal EEG during wakefulness, drowsiness and sleep.    Right posterior temporal intermittent focal slowing was noted.    CLINICAL CORRELATION:  The patient is a 42-year-old male who presented after   witnessed convulsion.  The patient is currently maintained on Keppra.  This is   an abnormal EEG during wakefulness, drowsiness and sleep.  The presence of right   posterior temporal focal slowing is suggestive of focal neuronal dysfunction in   this region.  There is no evidence of an epileptic  process on this recording.    No seizures were recorded during this study.  Excessive beta range activity is   likely secondary to recent use of propofol.      FAK/IN  dd: 03/28/2018 17:31:02 (CDT)  td: 03/28/2018 18:15:09 (CDT)  Doc ID   #[id number]  Job ID #[id number]    CC:

## 2018-03-28 NOTE — CONSULTS
03/28/2018      Date: 3/27/2018  Christiano Gomez .  New Patient Consult    No chief complaint on file.   chief complaint is respiratory failure    History of Present Illness:   The patient's father is interviewed for history.  The patient is disabled because of mental disabilities.  The patient has a history of smoking marijuana and came out of a shed yesterday with mental status altered.  Patient reported to fall on the ground jerking.  Patient was not responsive.  The patient came around but then had a repeat of the above unconsciousness and jerking.  Patient presented to [facility].  CT of the neck was done.  Radiology report of the neck CT is reviewed.  C-spine is reviewed directly.  There was no fracture reported in the neck looked good.  Patient was felt to have a head injury or neck injury (?).  Patient was intubated and sedated and transferred to [facility].  Today patient arouses readily.  He is on room air.  Chest x-rays clear.  I'm asked to evaluate for respiratory failure.    Past family social history   Past Medical History:   Diagnosis Date    Bipolar affective disorder     Hepatitis     Hep C    OD (overdose of drug)     Seizures      History reviewed. No pertinent surgical history.  Social History   Substance Use Topics    Smoking status: Current Every Day Smoker    Smokeless tobacco: Not on file    Alcohol use Yes     History reviewed. No pertinent family history.  Review of patient's allergies indicates:  No Known Allergies        Review of Systems:  due to neurologic status/impairments a Review of Systems could not be obtained       Exam:Comprehensive exam done. /68   Pulse 89   Temp 97.7 °F (36.5 °C) (Axillary)   Resp 20   Ht 6' (1.829 m)   Wt 75.2 kg (165 lb 12.6 oz)   SpO2 100%   BMI 22.48 kg/m²   Exam included Vitals as listed, and patient's appearance and affect and alertness and mood, oral exam for yeast and hygiene and pharynx lesions and Mallapatti (M)  score, neck with inspection for jvd and masses and thyroid abnormalities and lymph nodes (supraclavicular and infraclavicular nodes also examined and noted if abn), chest exam included symmetry and effort and fremitus and percussion and auscultation, cardiac exam included rhythm and gallops and murmur and rubs and jvd and edema, abdominal exam for mass and hepatosplenomegaly and tenderness and hernias and bowel sounds, Musculoskeletal exam with muscle tone and posture and mobility/gait and  strenght, and skin for rashes and cyanosis and pallor and turgor, extremity for clubbing.  Findings were normal except as listed below:  Initially patient is intubated.  The patient has a good examination except for being sedated initially.    Radiographs reviewed: view by direct vision, chest x-rays reviewed by direct vision and is clear.  C-spine CAT scan is view from [facility] and no abnormality seen by my review and the report is also good   Results for orders placed during the hospital encounter of 03/27/18   X-Ray Chest 1 View    Narrative EXAMINATION:  XR CHEST 1 VIEW    CLINICAL HISTORY:  Respiratory failure;    TECHNIQUE:  Single frontal view of the chest was performed.  At 9:10 p.m.    COMPARISON:  None    FINDINGS:  An endotracheal tube ends in the trachea above the germaine.  An NG tube traverses the esophagus to the stomach.  The cardiac size is within normal limits.  No intrapulmonary mass or infiltrate is seen.  No pneumothorax is noted.      Impression Endotracheal tube and NG is a shin.  Otherwise negative portable chest.      Electronically signed by: Edgar Haley MD  Date:    03/28/2018  Time:    07:52   ]    Labs       Recent Labs  Lab 03/27/18 1916 03/28/18  0604   WBC 18.80* 12.00   HGB 13.9* 13.4*   HCT 40.2 38.7*   * 105*   BAND 3.0  --    HGBA1C 4.5  --      Recent Labs  Lab 03/27/18 1916 03/28/18  0604 03/28/18  0740 03/28/18  1132     --  140  --   --    K 3.3*  --  2.8*  --    --    *  --  112*  --   --    CO2 17*  --  18*  --   --    BUN 8  --  5*  --   --    CREATININE 0.8  --  0.9  --   --      --  122*  --   --    CALCIUM 8.2*  --  8.2*  --   --    MG  --   --  2.5  --   --    PHOS  --   --  2.3*  --   --    AST 35  --  34  --   --    ALT 22  --  21  --   --    ALKPHOS 61  --  56  --   --    BILITOT 2.7*  --  2.1*  --   --    PROT 6.2  --  6.0  --   --    ALBUMIN 3.3*  --  3.1*  --   --    LIPASE 31  --   --   --   --    PROCAL 0.29*  --   --   --   --    INR  --   --  1.2  --   --    LACTATE 1.3  --   --   --   --    TROPONINI  --   < > 0.010  --  0.011   CPK  --   < >  --  526*  --    CPKMB  --   < >  --  7.5*  --    MB  --   < >  --  1.4  --    < > = values in this interval not displayed.    Recent Labs  Lab 03/27/18 1917   PH 7.422   PCO2 35.3   PO2 611*   HCO3 23.0*     Microbiology Results (last 7 days)     ** No results found for the last 168 hours. **          Impression:  Active Hospital Problems    Diagnosis  POA    *Unresponsive [R41.89]  Yes    Acute respiratory failure [J96.00]  Yes    Altered mental status [R41.82]  Yes    Bipolar affective disorder [F31.9]  Yes    Seizures [R56.9]  Yes    Polysubstance abuse [F19.10]  Yes    Chronic hepatitis C [B18.2]  Yes    Metabolic acidosis [E87.2]  Yes    Severe sepsis [A41.9, R65.20]  Yes      Resolved Hospital Problems    Diagnosis Date Resolved POA   No resolved problems to display.     Plan: Would recommend acute weaning and extubation.  Patient probably could be mobilized,  advanced diet, remove See.  Patient has substance abuse issues.  Further evaluation per Dr. Santos.  Please call if needed.  The cervical collar can be removed

## 2018-03-28 NOTE — NURSING
Wide awake with Diprovan at max dose reaching for ETT. J Way NP called and order received for Ativan/Dilaudid. Follows commands and can lift head off of pillow.

## 2018-03-28 NOTE — PROGRESS NOTES
03/27/18 1916   Patient Assessment/Suction   Level of Consciousness (AVPU) responds to voice   PRE-TX-O2-ETCO2   O2 Device (Oxygen Therapy) ventilator   Oxygen Concentration (%) 100   SpO2 100 %   Pulse Oximetry Type Continuous   ETCO2 (mmHg) 23 mmHg   $ ETCO2 Charge Exhaled CO2 Monitoring   $ ETCO2 Usage Currently wearing   Pulse 83   Resp (!) 25   Vent Select   Conventional Vent Y   Charged w/in last 24h YES   Preset Conventional Ventilator Settings   Vent Type    Ventilation Type VC   Vent Mode A/C   Humidity HME   Set Rate 24 bmp   Vt Set 500 mL   PEEP/CPAP 5 cmH20   Pressure Support 0 cmH20   Waveform RAMP   Peak Flow 60 L/min   Set Inspiratory Pressure 0 cmH20   Insp Time 0 Sec(s)   Plateau Set/Insp. Hold (sec) 0   Insp Rise Time  0 %   Trigger Sensitivity Flow/I-Trigger 2 L/min   P High 0 cm H2O   P Low 0 cm H2O   T High 0 sec   T Low 0 sec   Patient Ventilator Parameters   Resp Rate Total 24 br/min   Peak Airway Pressure 23 cmH2O   Mean Airway Pressure 12 cmH20   Plateau Pressure 0 cmH20   Exhaled Vt 479 mL   Total Ve 11.9 mL   Spont Ve 0 L   I:E Ratio Measured 1:1.80   Conventional Ventilator Alarms   Alarms On Y   Ve High Alarm 22 L/min   Resp Rate High Alarm 0 br/min   Press High Alarm 40 cmH2O   Apnea Rate 10   Apnea Volume (mL) 870 mL   Apnea Oxygen Concentration  100   Apnea Flow Rate (L/min) 104   T Apnea 20 sec(s)   Ready to Wean/Extubation Screen   FIO2<=50 (chart decimal) (!) 1   MV<16L (chart vol.) 11.9   PEEP <=8 (chart #) 5   Ready to Wean Parameters   F/VT Ratio<105 (RSBI) (!) 52.19   Labs   $ Was an ABG obtained? Arterial Puncture;ISTAT - Blood gas   $ Labs Tech Time 15 min

## 2018-03-28 NOTE — PROGRESS NOTES
Progress Note  Hospital Medicine  Patient Name:Christiano Gomez Jr  MRN:  [id number]  Patient Class: IP- Inpatient  Admit Date: 3/27/2018  Length of Stay: 1 days  Expected Discharge Date:   Attending Physician: Oly Santos MD  Primary Care Provider:  Primary Doctor No    SUBJECTIVE:     Principal Problem: Unresponsive  Initial history of present illness: Patient is a 42 y.o. male admitted to Hospitalist Service from [facility] Emergency Room with complaint of unresponsive and on ventilator support. [facility] is on ICU diversion. Patient reportedly has past medical history significant for Bipolar disorder, Chronic hepatitis C, multiple psych hospitalization and Polysubstance abuse. Part of the history obtained from patient's mother who lives next door to the patient. According to the mother, she was patient coming out of shed with device made of Sprite bottle which she suspects used for smoking illicit substance. She knows he reqularly smokes Weed but also suspects could be anything. Patient appeared confused, his eyes rolled back and strange sound came from his mouth and feel on the floor and started to seize. Full body shaking reported, foaming at mouth with urinary incontinence. No prior history of seizure reported. This lasted few seconds and patient woke up prior to arrival of EMS. Patient received 5 mg Versed IV and nasally intubated. Prior to transfer patient underwent CTA of head and neck at directions by Dr. Arias. I requested 2 ampules of sodium bicarbonate to be given prior to transfer which reportedly not given. Further details are not available.    PMH/PSH/SH/FH/Meds: reviewed.    Symptoms/Review of Systems:  Just extubated. Getting EEG done.  Diet:  NPO  Activity level: Ad marty  Pain: NAD    OBJECTIVE:   Vital Signs (Most Recent):      Temp: 97.7 °F (36.5 °C) (03/28/18 0730)  Pulse: 86 (03/28/18 0830)  Resp: (!) 26 (03/28/18 0830)  BP: 109/81 (03/28/18 0830)  SpO2: 99 % (03/28/18  0830)       Vital Signs Range (Last 24H):  Temp:  [97.7 °F (36.5 °C)-99 °F (37.2 °C)]   Pulse:  []   Resp:  [12-28]   BP: (109-141)/(58-91)   SpO2:  [99 %-100 %]     Weight: 75.2 kg (165 lb 12.6 oz)  Body mass index is 22.48 kg/m².    Intake/Output Summary (Last 24 hours) at 03/28/18 0911  Last data filed at 03/28/18 0800   Gross per 24 hour   Intake          1661.27 ml   Output             1600 ml   Net            61.27 ml     Physical Examination:  General appearance: well developed, appears stated age  Head: normocephalic, atraumatic  Eyes:  conjunctivae/corneas clear. PERRL.  Nose: Nares normal. Septum midline.  Throat: lips, mucosa, and tongue normal; teeth and gums normal, no throat erythema.  Neck: supple, symmetrical, trachea midline, no JVD and thyroid not enlarged, symmetric, no tenderness/mass/nodules  Lungs:  clear to auscultation bilaterally and normal respiratory effort  Chest wall: no tenderness  Heart: regular rate and rhythm, S1, S2 normal, no murmur, click, rub or gallop  Abdomen: soft, non-tender non-distented; bowel sounds normal; no masses,  no organomegaly  Extremities: no cyanosis, clubbing or edema.   Pulses: 2+ and symmetric  Skin: Skin color, texture, turgor normal. No rashes or lesions.  Lymph nodes: Cervical, supraclavicular, and axillary nodes normal.  Neurologic: Grossly non-focal     CBC:    Recent Labs  Lab 03/27/18 1916 03/28/18  0604   WBC 18.80* 12.00   RBC 4.64 4.46*   HGB 13.9* 13.4*   HCT 40.2 38.7*   * 105*   MCV 87 87   MCH 30.0 30.0   MCHC 34.7 34.6   BMP    Recent Labs  Lab 03/27/18 1916 03/28/18  0604    122*    140   K 3.3* 2.8*   * 112*   CO2 17* 18*   BUN 8 5*   CREATININE 0.8 0.9   CALCIUM 8.2* 8.2*   MG  --  2.5      Diagnostic Results:  Microbiology Results (last 7 days)     ** No results found for the last 168 hours. **         Chest X-Ray: No acute pulmonary infiltrate reported on [facility] records.     CT Head: No acute intra-cranial  process.     CT C-spine: Multi-level DJD changes, no acute osseous abnormality.     CTA of head and neck:Mild bilateral atheromatous changes without findings of significant luminal stenosis. No significant abnormality on CTA of brain.     EKG: NSR, 96, Prolonged cQT 485, no acute changes.  Assessment/Plan:      *Unresponsive [R41.89]   Yes    Altered mental status [R41.82]  Polysubstance abuse [F19.10] - unknown substance, THC abuse  Continue supplemental oxygen to keep PO2 above 92%. Follow Dr. Michael's recommendations.  Continue beta 2 agonist bronchodilator treatments.   Continue IV antibiotics - Zosyn.  Check sputum GS and Cx.   Neurochecks     Metabolic acidosis [E87.2] - related to sepsis Vs seizure activity - better  Severe sepsis [A41.9, R65.20]  Continue IVF hydration with sodium bicarbonate supplementation.  Follow stat labs.   Follow microbiology.  On IV Zosyn.       Yes    Bipolar affective disorder [F31.9]  May need Psych evaluation. Patient has had multiple prior psych hospitalization.      Yes    Seizures [R56.9]  Continue Keppra 500 mg IV q 12 hrs.  Follow EEG and neurology recommendations. Discussed with Dr. Hunt.  Seizure and fall precautions.      Yes      Hypokalemia  Replete KCl. Follow level.    Hypophosphatemia  Replete Phosphorus. Follow level.          Chronic hepatitis C [B18.2]  Trend LFTs.   Yes                       Discussed with patient's mother and aunt in detail and answered all the questions.          VTE Risk Mitigation         Ordered     enoxaparin injection 40 mg  Daily     Route:  Subcutaneous        03/27/18 1808     IP VTE HIGH RISK PATIENT  Once      03/27/18 1808        Oly Santos MD  Department of Hospital Medicine   [facility]

## 2018-03-28 NOTE — SUBJECTIVE & OBJECTIVE
Past Medical History:   Diagnosis Date    Bipolar affective disorder     Hepatitis     Hep C    OD (overdose of drug)     Seizures        History reviewed. No pertinent surgical history.    Review of patient's allergies indicates:  No Known Allergies      No current facility-administered medications on file prior to encounter.      No current outpatient prescriptions on file prior to encounter.     Family History     None        Social History Main Topics    Smoking status: Current Every Day Smoker    Smokeless tobacco: Not on file    Alcohol use Yes    Drug use: Yes    Sexual activity: Yes     Review of Systems   Constitutional: Positive for fatigue.   Neurological: Positive for seizures.   All other systems reviewed and are negative.    Objective:     Vital Signs (Most Recent):  Temp: 97.7 °F (36.5 °C) (03/28/18 0730)  Pulse: 89 (03/28/18 0900)  Resp: 20 (03/28/18 0900)  BP: 107/68 (03/28/18 0900)  SpO2: 100 % (03/28/18 0900) Vital Signs (24h Range):  Temp:  [97.7 °F (36.5 °C)-99 °F (37.2 °C)] 97.7 °F (36.5 °C)  Pulse:  [] 89  Resp:  [12-28] 20  SpO2:  [99 %-100 %] 100 %  BP: (107-141)/(58-91) 107/68     Weight: 75.2 kg (165 lb 12.6 oz)  Body mass index is 22.48 kg/m².    Physical Exam        General: Well developed, well nourished.  No acute distress.  HEENT: Atraumatic, normocephalic.  Musculoskeletal: No obvious joint deformities, moves all extremities well.  Skin: No obvious rashes    Neurological Exam:  Mental status: Awake, alert.   Speech/Language: No dysarthria or aphasia on conversation.   Cranial nerves: Pupils equal round and reactive to light, extraocular movements intact, facial strength and sensation intact bilaterally, palate and tongue midline, hearing grossly intact bilaterally.  Motor: 5 out of 5 strength throughout the upper and lower extremities bilaterally. Normal bulk and tone.   Sensation: Intact to light touch, pinprick, and vibration bilaterally.  DTR: 2+ at the knees and  biceps bilaterally.  Coordination: Finger-nose-finger testing and rapid alternating movements normal bilaterally. No tremor.   Gait: deferred, groggy and recently extubated    Significant Labs: All pertinent lab results from the past 24 hours have been reviewed.    Significant Imaging: I have reviewed and interpreted all pertinent imaging results/findings within the past 24 hours.

## 2018-03-28 NOTE — ASSESSMENT & PLAN NOTE
Improving, still appears confused and had difficulty following commands on my exam. Suspect post-ictal or drug induced.

## 2018-03-28 NOTE — PLAN OF CARE
CM met with patient at bedside just after EEG was completed. Patient appeared groggy but was able to answer questions and was oriented to person, place and time. Patient reports living with his father and two sons. Pharmacy is [facility], he denies PCP, POA living will or  services. Patient reports seeing a psychiatrist, Dr. Scott for bipolar disorder. Patient reports independence with ADL's, does not drive and plans to return home with no needs.      03/28/18 1145   Discharge Assessment   Assessment Type Discharge Planning Assessment   Confirmed/corrected address and phone number on facesheet? Yes   Assessment information obtained from? Patient   Communicated expected length of stay with patient/caregiver yes   Prior to hospitilization cognitive status: Alert/Oriented   Prior to hospitalization functional status: Independent   Current cognitive status: Alert/Oriented   Current Functional Status: Independent   Lives With parent(s)   Able to Return to Prior Arrangements yes   Is patient able to care for self after discharge? Yes   Patient's perception of discharge disposition home or selfcare   Readmission Within The Last 30 Days no previous admission in last 30 days   Patient currently being followed by outpatient case management? No   Patient currently receives any other outside agency services? No   Equipment Currently Used at Home none   Do you have any problems affording any of your prescribed medications? No   Is the patient taking medications as prescribed? yes   Does the patient have transportation home? Yes   Transportation Available family or friend will provide   Does the patient receive services at the [facility]? No   Discharge Plan A Home   Patient/Family In Agreement With Plan yes

## 2018-03-28 NOTE — PLAN OF CARE
Problem: Patient Care Overview  Goal: Plan of Care Review  Outcome: Ongoing (interventions implemented as appropriate)  Care plan reviewed and revised.   Safety maintained.   Patient afebrile.   Comfort level set for patient on specialty bed, assisted with repositioning, see notes.  Patient was extubated this morning at 0830, doing well on nasal canula, 2L  Passed bedside nursing swallow eval, diet ordered for dinner.

## 2018-03-28 NOTE — PROGRESS NOTES
Dr. Michael consulted at 0800.  Dr. Hunt consulted at 0843. She states she will be here this afternoon, and is aware that an EEG has already been ordered.

## 2018-03-28 NOTE — ASSESSMENT & PLAN NOTE
He had a one time seizure yesterday. This was likely induced from substance use. I do not see a tox screen on the patient but it might have been done at [facility]. We need to obtain EEG and MRI. If these are normal, he does not need to be maintained on keppra.     He does not seem at normal mental status. Once his mental status is clear, he needs to be counseled again on driving laws. He should not drive for 6 months. He should abstain from drugs as they can cause seizures.

## 2018-03-28 NOTE — CONSULTS
"[facility]  Neurology  Consult Note    Patient Name: Christiano Gomez Jr.  MRN: [id number]  Admission Date: 3/27/2018  Hospital Length of Stay: 1 days  Code Status: Full Code   Consulting Provider: Shira Hunt MD  Primary Care Physician: Primary Doctor No  Principal Problem:Unresponsive    Consults   Subjective:     Chief Complaint:  seizures    HPI:   Mr. Gomez is a 42 year old man with bipolar disorder, polysubstance use, and hepatitis C who presents s/p seizure yesterday. The patient does not recall the history so the following history was obtained my medical record review.     He reportedly came out of the shed at his home with a bottle and was suspected of having smoked marijuana. The patient admits to "cigarettes" but on further questioning said he did smoke "a little marijuana". His mother saw him fall, foam at the mouth, and have generalized convulsive activity. He was post-ictal and was intubated by EMS. He was given versed as well. No further seizure activity. He has since been extubated and is doing well. Complains of feeling tired.     He denies any prior seizures. No family history of seizures. No history of meningitis/encephalitis or head trauma.      Past Medical History:   Diagnosis Date    Bipolar affective disorder     Hepatitis     Hep C    OD (overdose of drug)     Seizures        History reviewed. No pertinent surgical history.    Review of patient's allergies indicates:  No Known Allergies      No current facility-administered medications on file prior to encounter.      No current outpatient prescriptions on file prior to encounter.     Family History     None        Social History Main Topics    Smoking status: Current Every Day Smoker    Smokeless tobacco: Not on file    Alcohol use Yes    Drug use: Yes    Sexual activity: Yes     Review of Systems   Constitutional: Positive for fatigue.   Neurological: Positive for seizures.   All other systems reviewed and are " negative.    Objective:     Vital Signs (Most Recent):  Temp: 97.7 °F (36.5 °C) (03/28/18 0730)  Pulse: 89 (03/28/18 0900)  Resp: 20 (03/28/18 0900)  BP: 107/68 (03/28/18 0900)  SpO2: 100 % (03/28/18 0900) Vital Signs (24h Range):  Temp:  [97.7 °F (36.5 °C)-99 °F (37.2 °C)] 97.7 °F (36.5 °C)  Pulse:  [] 89  Resp:  [12-28] 20  SpO2:  [99 %-100 %] 100 %  BP: (107-141)/(58-91) 107/68     Weight: 75.2 kg (165 lb 12.6 oz)  Body mass index is 22.48 kg/m².    Physical Exam        General: Well developed, well nourished.  No acute distress.  HEENT: Atraumatic, normocephalic.  Musculoskeletal: No obvious joint deformities, moves all extremities well.  Skin: No obvious rashes    Neurological Exam:  Mental status: Awake, alert.   Speech/Language: No dysarthria or aphasia on conversation.   Cranial nerves: Pupils equal round and reactive to light, extraocular movements intact, facial strength and sensation intact bilaterally, palate and tongue midline, hearing grossly intact bilaterally.  Motor: 5 out of 5 strength throughout the upper and lower extremities bilaterally. Normal bulk and tone.   Sensation: Intact to light touch, pinprick, and vibration bilaterally.  DTR: 2+ at the knees and biceps bilaterally.  Coordination: Finger-nose-finger testing and rapid alternating movements normal bilaterally. No tremor.   Gait: deferred, groggy and recently extubated    Significant Labs: All pertinent lab results from the past 24 hours have been reviewed.    Significant Imaging: I have reviewed and interpreted all pertinent imaging results/findings within the past 24 hours.    Assessment and Plan:     Metabolic acidosis    Likely secondary to seizure activity        Polysubstance abuse    Needs to abstain from drug use        Seizures    He had a one time seizure yesterday. This was likely induced from substance use. I do not see a tox screen on the patient but it might have been done at [facility]. We need to obtain EEG and MRI. If  these are normal, he does not need to be maintained on keppra.     He does not seem at normal mental status. Once his mental status is clear, he needs to be counseled again on driving laws. He should not drive for 6 months. He should abstain from drugs as they can cause seizures.         Altered mental status    Improving, still appears confused and had difficulty following commands on my exam. Suspect post-ictal or drug induced.             VTE Risk Mitigation         Ordered     enoxaparin injection 40 mg  Daily     Route:  Subcutaneous        03/27/18 1808     IP VTE HIGH RISK PATIENT  Once      03/27/18 1808          Thank you for your consult. I will follow-up with patient. Please contact us if you have any additional questions.    Shira Hunt MD  Neurology  [facility]

## 2018-03-28 NOTE — PROGRESS NOTES
0830: Patient extubated, tolerated well. Hoarse voice, will assess in a little while to see if patient able to swallow with no problem.   Remains a little drowsy.   C-collar removed.

## 2018-03-28 NOTE — HPI
"Mr. Gomez is a 42 year old man with bipolar disorder, polysubstance use, and hepatitis C who presents s/p seizure yesterday. The patient does not recall the history so the following history was obtained my medical record review.     He reportedly came out of the shed at his home with a bottle and was suspected of having smoked marijuana. The patient admits to "cigarettes" but on further questioning said he did smoke "a little marijuana". His mother saw him fall, foam at the mouth, and have generalized convulsive activity. He was post-ictal and was intubated by EMS. He was given versed as well. No further seizure activity. He has since been extubated and is doing well. Complains of feeling tired.     He denies any prior seizures. No family history of seizures. No history of meningitis/encephalitis or head trauma.   "

## 2018-03-29 LAB
ALBUMIN SERPL BCP-MCNC: 2.9 G/DL
ALP SERPL-CCNC: 55 U/L
ALT SERPL W/O P-5'-P-CCNC: 26 U/L
ANION GAP SERPL CALC-SCNC: 8 MMOL/L
ANION GAP SERPL CALC-SCNC: 9 MMOL/L
AST SERPL-CCNC: 62 U/L
BASOPHILS # BLD AUTO: 0 K/UL
BASOPHILS NFR BLD: 0.3 %
BILIRUB SERPL-MCNC: 2.5 MG/DL
BUN SERPL-MCNC: 2 MG/DL
BUN SERPL-MCNC: 3 MG/DL
CALCIUM SERPL-MCNC: 8 MG/DL
CALCIUM SERPL-MCNC: 8.2 MG/DL
CHLORIDE SERPL-SCNC: 112 MMOL/L
CHLORIDE SERPL-SCNC: 113 MMOL/L
CO2 SERPL-SCNC: 19 MMOL/L
CO2 SERPL-SCNC: 19 MMOL/L
CREAT SERPL-MCNC: 0.7 MG/DL
CREAT SERPL-MCNC: 0.8 MG/DL
DIFFERENTIAL METHOD: ABNORMAL
EOSINOPHIL # BLD AUTO: 0.1 K/UL
EOSINOPHIL NFR BLD: 1.9 %
ERYTHROCYTE [DISTWIDTH] IN BLOOD BY AUTOMATED COUNT: 14.2 %
EST. GFR  (AFRICAN AMERICAN): >60 ML/MIN/1.73 M^2
EST. GFR  (AFRICAN AMERICAN): >60 ML/MIN/1.73 M^2
EST. GFR  (NON AFRICAN AMERICAN): >60 ML/MIN/1.73 M^2
EST. GFR  (NON AFRICAN AMERICAN): >60 ML/MIN/1.73 M^2
GLUCOSE SERPL-MCNC: 109 MG/DL
GLUCOSE SERPL-MCNC: 98 MG/DL
HCT VFR BLD AUTO: 36.3 %
HGB BLD-MCNC: 12.4 G/DL
INR PPP: 1.2
LYMPHOCYTES # BLD AUTO: 1.9 K/UL
LYMPHOCYTES NFR BLD: 29.5 %
MAGNESIUM SERPL-MCNC: 2 MG/DL
MCH RBC QN AUTO: 29.9 PG
MCHC RBC AUTO-ENTMCNC: 34.2 G/DL
MCV RBC AUTO: 88 FL
MONOCYTES # BLD AUTO: 0.4 K/UL
MONOCYTES NFR BLD: 6.5 %
NEUTROPHILS # BLD AUTO: 4.1 K/UL
NEUTROPHILS NFR BLD: 61.8 %
PHOSPHATE SERPL-MCNC: 2.4 MG/DL
PHOSPHATE SERPL-MCNC: 2.8 MG/DL
PLATELET # BLD AUTO: 84 K/UL
PMV BLD AUTO: 9 FL
POTASSIUM SERPL-SCNC: 2.6 MMOL/L
POTASSIUM SERPL-SCNC: 3.3 MMOL/L
POTASSIUM SERPL-SCNC: 3.5 MMOL/L
PROT SERPL-MCNC: 5.6 G/DL
PROTHROMBIN TIME: 12.5 SEC
RBC # BLD AUTO: 4.15 M/UL
SODIUM SERPL-SCNC: 140 MMOL/L
SODIUM SERPL-SCNC: 140 MMOL/L
WBC # BLD AUTO: 6.6 K/UL

## 2018-03-29 PROCEDURE — 63600175 PHARM REV CODE 636 W HCPCS: Performed by: INTERNAL MEDICINE

## 2018-03-29 PROCEDURE — C9113 INJ PANTOPRAZOLE SODIUM, VIA: HCPCS | Performed by: INTERNAL MEDICINE

## 2018-03-29 PROCEDURE — 85610 PROTHROMBIN TIME: CPT

## 2018-03-29 PROCEDURE — 36415 COLL VENOUS BLD VENIPUNCTURE: CPT

## 2018-03-29 PROCEDURE — 83735 ASSAY OF MAGNESIUM: CPT

## 2018-03-29 PROCEDURE — 99232 SBSQ HOSP IP/OBS MODERATE 35: CPT | Mod: ,,, | Performed by: INTERNAL MEDICINE

## 2018-03-29 PROCEDURE — S4991 NICOTINE PATCH NONLEGEND: HCPCS | Performed by: INTERNAL MEDICINE

## 2018-03-29 PROCEDURE — S5010 5% DEXTROSE AND 0.45% SALINE: HCPCS | Performed by: INTERNAL MEDICINE

## 2018-03-29 PROCEDURE — 84100 ASSAY OF PHOSPHORUS: CPT

## 2018-03-29 PROCEDURE — 94761 N-INVAS EAR/PLS OXIMETRY MLT: CPT

## 2018-03-29 PROCEDURE — 63600175 PHARM REV CODE 636 W HCPCS: Performed by: NURSE PRACTITIONER

## 2018-03-29 PROCEDURE — 85025 COMPLETE CBC W/AUTO DIFF WBC: CPT

## 2018-03-29 PROCEDURE — 80053 COMPREHEN METABOLIC PANEL: CPT

## 2018-03-29 PROCEDURE — 12000002 HC ACUTE/MED SURGE SEMI-PRIVATE ROOM

## 2018-03-29 PROCEDURE — 97802 MEDICAL NUTRITION INDIV IN: CPT | Performed by: DIETITIAN, REGISTERED

## 2018-03-29 PROCEDURE — 80048 BASIC METABOLIC PNL TOTAL CA: CPT

## 2018-03-29 PROCEDURE — 99232 SBSQ HOSP IP/OBS MODERATE 35: CPT | Mod: ,,, | Performed by: PSYCHIATRY & NEUROLOGY

## 2018-03-29 PROCEDURE — 84132 ASSAY OF SERUM POTASSIUM: CPT

## 2018-03-29 PROCEDURE — 25000003 PHARM REV CODE 250: Performed by: INTERNAL MEDICINE

## 2018-03-29 PROCEDURE — 25000003 PHARM REV CODE 250: Performed by: NURSE PRACTITIONER

## 2018-03-29 RX ORDER — POTASSIUM CHLORIDE 20 MEQ/1
40 TABLET, EXTENDED RELEASE ORAL ONCE
Status: COMPLETED | OUTPATIENT
Start: 2018-03-29 | End: 2018-03-29

## 2018-03-29 RX ADMIN — LEVETIRACETAM 500 MG: 5 INJECTION INTRAVENOUS at 09:03

## 2018-03-29 RX ADMIN — PIPERACILLIN SODIUM AND TAZOBACTAM SODIUM 4.5 G: 4; .5 INJECTION, POWDER, FOR SOLUTION INTRAVENOUS at 10:03

## 2018-03-29 RX ADMIN — LORAZEPAM 1 MG: 2 INJECTION INTRAMUSCULAR; INTRAVENOUS at 09:03

## 2018-03-29 RX ADMIN — POTASSIUM CHLORIDE 40 MEQ: 1500 TABLET, EXTENDED RELEASE ORAL at 10:03

## 2018-03-29 RX ADMIN — DEXTROSE AND SODIUM CHLORIDE: 5; .45 INJECTION, SOLUTION INTRAVENOUS at 09:03

## 2018-03-29 RX ADMIN — ENOXAPARIN SODIUM 40 MG: 100 INJECTION SUBCUTANEOUS at 06:03

## 2018-03-29 RX ADMIN — POTASSIUM PHOSPHATE, MONOBASIC AND POTASSIUM PHOSPHATE, DIBASIC 30 MMOL: 224; 236 INJECTION, SOLUTION INTRAVENOUS at 06:03

## 2018-03-29 RX ADMIN — POTASSIUM CHLORIDE 40 MEQ: 1500 TABLET, EXTENDED RELEASE ORAL at 06:03

## 2018-03-29 RX ADMIN — PIPERACILLIN SODIUM AND TAZOBACTAM SODIUM 4.5 G: 4; .5 INJECTION, POWDER, FOR SOLUTION INTRAVENOUS at 09:03

## 2018-03-29 RX ADMIN — NICOTINE 1 PATCH: 14 PATCH, EXTENDED RELEASE TRANSDERMAL at 09:03

## 2018-03-29 RX ADMIN — PIPERACILLIN SODIUM AND TAZOBACTAM SODIUM 4.5 G: 4; .5 INJECTION, POWDER, FOR SOLUTION INTRAVENOUS at 03:03

## 2018-03-29 RX ADMIN — PANTOPRAZOLE SODIUM 40 MG: 40 INJECTION, POWDER, FOR SOLUTION INTRAVENOUS at 09:03

## 2018-03-29 NOTE — ASSESSMENT & PLAN NOTE
He had a one time seizure yesterday. This was likely induced from substance use. MRI and EEG are normal. I do not recommend any AED at this time. I encouraged cessation for substance abuse. The mother understands he should not drive.

## 2018-03-29 NOTE — PLAN OF CARE
This note also relates to the following rows which could not be included:  Pulse - Cannot attach notes to unvalidated device data  Resp - Cannot attach notes to unvalidated device data       03/29/18 0812   Patient Assessment/Suction   Level of Consciousness (AVPU) alert   All Lung Fields Breath Sounds clear;diminished   PRE-TX-O2-ETCO2   O2 Device (Oxygen Therapy) room air   SpO2 95 %   Pulse Oximetry Type Continuous   $ Pulse Oximetry - Multiple Charge Pulse Oximetry - Multiple

## 2018-03-29 NOTE — PROGRESS NOTES
Pt transferred to PCU room 207-1 via w/c, NAD noted. Pt's mother at . Report given to Paula PARK.     Edgar Sifuentes RN

## 2018-03-29 NOTE — SUBJECTIVE & OBJECTIVE
Subjective:     Interval History: No further seizure activity. Transferred out of ICU. MRI and EEG normal. Mother present at bedside today and describes drug use.    Current Facility-Administered Medications   Medication Dose Route Frequency Provider Last Rate Last Dose    acetaminophen tablet 650 mg  650 mg Oral Q6H PRN Oly Santos MD   650 mg at 03/28/18 1708    dextrose 50% injection 12.5 g  12.5 g Intravenous PRN Oly Santos MD        dextrose 50% injection 25 g  25 g Intravenous PRN Oly Santos MD        enoxaparin injection 40 mg  40 mg Subcutaneous Daily Oly Santos MD   40 mg at 03/28/18 1624    glucagon (human recombinant) injection 1 mg  1 mg Intramuscular PRN Oly Santos MD        glucose chewable tablet 16 g  16 g Oral PRN Oly Santos MD        glucose chewable tablet 24 g  24 g Oral PRN Oly Santos MD        hydromorphone (PF) injection 1 mg  1 mg Intravenous Q4H PRN Justen Carlos NP   1 mg at 03/28/18 0506    lorazepam injection 1 mg  1 mg Intravenous Q4H PRN Justen Carlos NP   1 mg at 03/28/18 0505    nicotine 14 mg/24 hr 1 patch  1 patch Transdermal Daily Oly Santos MD   1 patch at 03/29/18 0914    pantoprazole injection 40 mg  40 mg Intravenous Daily Oly Santos MD   40 mg at 03/29/18 0914    piperacillin-tazobactam 4.5 g in dextrose 5 % 100 mL IVPB (ready to mix system)  4.5 g Intravenous Q8H Oly Santos  mL/hr at 03/29/18 1056 4.5 g at 03/29/18 1056       Review of Systems  Objective:     Vital Signs (Most Recent):  Temp: 99.5 °F (37.5 °C) (03/29/18 1545)  Pulse: 94 (03/29/18 1545)  Resp: 20 (03/29/18 1545)  BP: 116/66 (03/29/18 1545)  SpO2: 95 % (03/29/18 1545) Vital Signs (24h Range):  Temp:  [97 °F (36.1 °C)-99.5 °F (37.5 °C)] 99.5 °F (37.5 °C)  Pulse:  [76-94] 94  Resp:  [20-46] 20  SpO2:  [95 %-99 %] 95 %  BP: ()/(55-75) 116/66     Weight: 75.2 kg (165 lb 12.6 oz)  Body mass index is 22.48 kg/m².    Physical Exam     awake alert, moving all extremities,  confused and restless in the bed    Significant Labs: All pertinent lab results from the past 24 hours have been reviewed.    Significant Imaging: I have reviewed and interpreted all pertinent imaging results/findings within the past 24 hours.

## 2018-03-29 NOTE — PROGRESS NOTES
[facility]  Neurology  Progress Note    Patient Name: Christiano Gomez Jr.  MRN: [id number]  Admission Date: 3/27/2018  Hospital Length of Stay: 2 days  Code Status: Full Code   Attending Provider: Oly Santos MD  Primary Care Physician: Primary Doctor No   Principal Problem:Unresponsive      Subjective:     Interval History: No further seizure activity. Transferred out of ICU. MRI and EEG normal. Mother present at bedside today and describes drug use.    Current Facility-Administered Medications   Medication Dose Route Frequency Provider Last Rate Last Dose    acetaminophen tablet 650 mg  650 mg Oral Q6H PRN Oly Santos MD   650 mg at 03/28/18 1708    dextrose 50% injection 12.5 g  12.5 g Intravenous PRN Oly Santos MD        dextrose 50% injection 25 g  25 g Intravenous PRN Oly Santos MD        enoxaparin injection 40 mg  40 mg Subcutaneous Daily Oly Santos MD   40 mg at 03/28/18 1624    glucagon (human recombinant) injection 1 mg  1 mg Intramuscular PRN Oly Santos MD        glucose chewable tablet 16 g  16 g Oral PRN Oly Santos MD        glucose chewable tablet 24 g  24 g Oral PRN Oly Santos MD        hydromorphone (PF) injection 1 mg  1 mg Intravenous Q4H PRN Justen Carlos NP   1 mg at 03/28/18 0506    lorazepam injection 1 mg  1 mg Intravenous Q4H PRN Justen Carlos NP   1 mg at 03/28/18 0505    nicotine 14 mg/24 hr 1 patch  1 patch Transdermal Daily Oly Santos MD   1 patch at 03/29/18 0914    pantoprazole injection 40 mg  40 mg Intravenous Daily Oly Santos MD   40 mg at 03/29/18 0914    piperacillin-tazobactam 4.5 g in dextrose 5 % 100 mL IVPB (ready to mix system)  4.5 g Intravenous Q8H Oly Santos  mL/hr at 03/29/18 1056 4.5 g at 03/29/18 1056       Review of Systems  Objective:     Vital Signs (Most Recent):  Temp: 99.5 °F (37.5 °C) (03/29/18 1545)  Pulse: 94 (03/29/18 1545)  Resp: 20 (03/29/18 1545)  BP: 116/66 (03/29/18 1545)  SpO2: 95 % (03/29/18 1545) Vital  Signs (24h Range):  Temp:  [97 °F (36.1 °C)-99.5 °F (37.5 °C)] 99.5 °F (37.5 °C)  Pulse:  [76-94] 94  Resp:  [20-46] 20  SpO2:  [95 %-99 %] 95 %  BP: ()/(55-75) 116/66     Weight: 75.2 kg (165 lb 12.6 oz)  Body mass index is 22.48 kg/m².    Physical Exam     awake alert, moving all extremities, confused and restless in the bed    Significant Labs: All pertinent lab results from the past 24 hours have been reviewed.    Significant Imaging: I have reviewed and interpreted all pertinent imaging results/findings within the past 24 hours.    Assessment and Plan:     Metabolic acidosis    Likely secondary to seizure activity        Polysubstance abuse    Needs to abstain from drug use        Seizures    He had a one time seizure yesterday. This was likely induced from substance use. MRI and EEG are normal. I do not recommend any AED at this time. I encouraged cessation for substance abuse. The mother understands he should not drive.         Altered mental status    Psychiatry consult. Post-ictal state should be cleared by now, suspect psych or drug induced        I will sign off. Please don't hesitate to call with questions or concerns.     VTE Risk Mitigation         Ordered     enoxaparin injection 40 mg  Daily     Route:  Subcutaneous        03/27/18 1808     IP VTE HIGH RISK PATIENT  Once      03/27/18 1808          Shira Hunt MD  Neurology  [facility]

## 2018-03-29 NOTE — PLAN OF CARE
Problem: Patient Care Overview  Goal: Plan of Care Review  Outcome: Ongoing (interventions implemented as appropriate)  Recommendations  Recommendation/Intervention: 1.) Continue with Adult regular diet 2.)Recommend Boost plus if PO intake <50%  Goals: 1.) patient will meet >80% of EEN while admitted  Nutrition Goal Status: new  Communication of RD Recs: reviewed with RN

## 2018-03-29 NOTE — PROGRESS NOTES
Progress Note  Hospital Medicine  Patient Name:Christiano Gomez Jr.  MRN:  [id number]  Patient Class: IP- Inpatient  Admit Date: 3/27/2018  Length of Stay: 2 days  Expected Discharge Date:   Attending Physician: Oly Santos MD  Primary Care Provider:  Primary Doctor No    SUBJECTIVE:     Principal Problem: Unresponsive  Initial history of present illness: Patient is a 42 y.o. male admitted to Hospitalist Service from [facility] Emergency Room with complaint of unresponsive and on ventilator support. [facility] is on ICU diversion. Patient reportedly has past medical history significant for Bipolar disorder, Chronic hepatitis C, multiple psych hospitalization and Polysubstance abuse. Part of the history obtained from patient's mother who lives next door to the patient. According to the mother, she was patient coming out of shed with device made of Sprite bottle which she suspects used for smoking illicit substance. She knows he reqularly smokes Weed but also suspects could be anything. Patient appeared confused, his eyes rolled back and strange sound came from his mouth and feel on the floor and started to seize. Full body shaking reported, foaming at mouth with urinary incontinence. No prior history of seizure reported. This lasted few seconds and patient woke up prior to arrival of EMS. Patient received 5 mg Versed IV and nasally intubated. Prior to transfer patient underwent CTA of head and neck at directions by Dr. Arias. I requested 2 ampules of sodium bicarbonate to be given prior to transfer which reportedly not given. Further details are not available.    PMH/PSH/SH/FH/Meds: reviewed.    Symptoms/Review of Systems: Looking and feeling better. Mood stable. No acute distress. Admits smoking THC on regular basis. Denied any depression, SI or HI.   Diet: Regular  Activity level: Ad marty  Pain: NAD    OBJECTIVE:   Vital Signs (Most Recent):      Temp: 98.3 °F (36.8 °C) (03/29/18 0400)  Pulse: 78 (03/29/18  0730)  Resp: (!) 26 (03/29/18 0730)  BP: 103/69 (03/29/18 0700)  SpO2: 95 % (03/29/18 0812)       Vital Signs Range (Last 24H):  Temp:  [97 °F (36.1 °C)-99.5 °F (37.5 °C)]   Pulse:  [70-94]   Resp:  [16-46]   BP: (100-127)/(55-80)   SpO2:  [95 %-100 %]     Weight: 75.2 kg (165 lb 12.6 oz)  Body mass index is 22.48 kg/m².    Intake/Output Summary (Last 24 hours) at 03/29/18 0847  Last data filed at 03/29/18 0646   Gross per 24 hour   Intake             3480 ml   Output             2090 ml   Net             1390 ml     Physical Examination:  General appearance: well developed, appears stated age  Head: normocephalic, atraumatic  Eyes:  conjunctivae/corneas clear. PERRL.  Nose: Nares normal. Septum midline.  Throat: lips, mucosa, and tongue normal; teeth and gums normal, no throat erythema.  Neck: supple, symmetrical, trachea midline, no JVD and thyroid not enlarged, symmetric, no tenderness/mass/nodules  Lungs:  clear to auscultation bilaterally and normal respiratory effort  Chest wall: no tenderness  Heart: regular rate and rhythm, S1, S2 normal, no murmur, click, rub or gallop  Abdomen: soft, non-tender non-distented; bowel sounds normal; no masses,  no organomegaly  Extremities: no cyanosis, clubbing or edema.   Pulses: 2+ and symmetric  Skin: Skin color, texture, turgor normal. No rashes or lesions.  Lymph nodes: Cervical, supraclavicular, and axillary nodes normal.  Neurologic: Grossly non-focal     CBC:    Recent Labs  Lab 03/27/18  1916 03/28/18  0604 03/29/18  0427   WBC 18.80* 12.00 6.60   RBC 4.64 4.46* 4.15*   HGB 13.9* 13.4* 12.4*   HCT 40.2 38.7* 36.3*   * 105* 84*   MCV 87 87 88   MCH 30.0 30.0 29.9   MCHC 34.7 34.6 34.2   BMP    Recent Labs  Lab 03/27/18  1916 03/28/18  0604 03/29/18  0427    122* 109    140 140   K 3.3* 2.8* 2.6*   * 112* 112*   CO2 17* 18* 19*   BUN 8 5* 3*   CREATININE 0.8 0.9 0.8   CALCIUM 8.2* 8.2* 8.0*   MG  --  2.5 2.0      Diagnostic  Results:  Microbiology Results (last 7 days)     ** No results found for the last 168 hours. **         Chest X-Ray: No acute pulmonary infiltrate reported on [facility] records.     CT Head: No acute intra-cranial process.     CT C-spine: Multi-level DJD changes, no acute osseous abnormality.     CTA of head and neck:Mild bilateral atheromatous changes without findings of significant luminal stenosis. No significant abnormality on CTA of brain.     EKG: NSR, 96, Prolonged cQT 485, no acute changes.    MRI Brain:  No acute intracranial abnormality is seen.  Motion limited examination.    EEG: This is an abnormal EEG during wakefulness, drowsiness and sleep.    Right posterior temporal intermittent focal slowing was noted.    Assessment/Plan:      *Unresponsive [R41.89]   Yes    Altered mental status [R41.82]  Polysubstance abuse [F19.10] - unknown substance, THC abuse  Continue supplemental oxygen to keep PO2 above 92%. Follow Dr. Michael's recommendations.  Continue beta 2 agonist bronchodilator treatments.   Continue IV antibiotics - Zosyn.  Check sputum GS and Cx.   Neurochecks     Metabolic acidosis [E87.2] - related to sepsis Vs seizure activity - better  Severe sepsis [A41.9, R65.20]  DC IVF, eating and drinking well. Follow microbiology.  On IV Zosyn. Change in PO Augmentin am.      Yes    Bipolar affective disorder [F31.9]  Await Dr. Mars evaluation. Patient has had multiple prior psych hospitalization.      Yes    Seizures [R56.9]  Since EEG and MRI are unremarkable, DC Keppra as per Dr. Hunt recommendations.  Seizure and fall precautions.      Yes      Hypokalemia  Replete KCl. Follow level.    Hypophosphatemia  Replete Phosphorus. Follow level.          Chronic hepatitis C [B18.2]  Trend LFTs.   Yes                       Transfer to [facility]-floor.         VTE Risk Mitigation         Ordered     enoxaparin injection 40 mg  Daily     Route:  Subcutaneous        03/27/18 1808     IP VTE HIGH RISK PATIENT  Once       03/27/18 1808        Oly Santos MD  Department of Hospital Medicine   [facility]

## 2018-03-29 NOTE — PROGRESS NOTES
[facility]  Adult Nutrition  Progress Note    SUMMARY       Recommendations  Recommendation/Intervention: 1.) Continue with Adult regular diet 2.)Recommend Boost plus if PO intake <50%  Goals: 1.) patient will meet >80% of EEN while admitted  Nutrition Goal Status: new  Communication of RD Recs: reviewed with RN     1. Altered mental status    2. Unresponsive    3. Seizures      Past Medical History:   Diagnosis Date    Bipolar affective disorder     Hepatitis     Hep C    OD (overdose of drug)     Seizures          Reason for Assessment  Reason for Assessment: identified at risk by screening criteria  Interdisciplinary Rounds: attended  General Information Comments: Transferred from [facility] on vent. Extubated. spoke with RN, patient eating well. Consumed 100% of dinner. Tolerating diet. Patient unable to state a UBW. LBM unknown.     Nutrition Risk Screen  Nutrition Risk Screen: tube feeding or parenteral nutrition    Nutrition/Diet History  Food Preferences: no food preferences reported.   Do you have any cultural, spiritual, Buddhism conflicts, given your current situation?: BILL  Food Allergies: NKFA    Anthropometrics  Temp: 98.6 °F (37 °C)  Height Method: Estimated  Height: 6'  Height (inches): 72 in  Weight Method: Bed Scale  Weight: 75.2 kg (165 lb 12.6 oz)  Weight (lb): 165.79 lb  Ideal Body Weight (IBW), Male: 178 lb  % Ideal Body Weight, Male (lb): 93.14 lb  BMI (Calculated): 22.5  BMI Grade: 18.5-24.9 - normal  Usual Body Weight (UBW), kg:  (bill)      Lab/Procedures/Meds  Pertinent Labs Reviewed: reviewed  BMP  Lab Results   Component Value Date     03/29/2018    K 3.3 (L) 03/29/2018     (H) 03/29/2018    CO2 19 (L) 03/29/2018    BUN 2 (L) 03/29/2018    CREATININE 0.7 03/29/2018    CALCIUM 8.2 (L) 03/29/2018    ANIONGAP 8 03/29/2018    ESTGFRAFRICA >60 03/29/2018    EGFRNONAA >60 03/29/2018     Lab Results   Component Value Date    ALBUMIN 2.9 (L) 03/29/2018     Lab  Results   Component Value Date    CALCIUM 8.2 (L) 03/29/2018    PHOS 2.4 (L) 03/29/2018     No results for input(s): POCTGLUCOSE in the last 24 hours.   No results found for: CRP    Lab Results   Component Value Date    HGBA1C 4.5 03/27/2018       Lab Results   Component Value Date    CHOL 109 (L) 03/28/2018     Lab Results   Component Value Date    HDL 20 (L) 03/28/2018     Lab Results   Component Value Date    LDLCALC 73.8 03/28/2018     Lab Results   Component Value Date    TRIG 76 03/28/2018     Lab Results   Component Value Date    CHOLHDL 18.3 (L) 03/28/2018         Pertinent Medications Reviewed: reviewed  Scheduled Meds:   enoxaparin  40 mg Subcutaneous Daily    nicotine  1 patch Transdermal Daily    pantoprazole  40 mg Intravenous Daily    piperacillin-tazobactam 4.5 g in dextrose 5 % 100 mL IVPB (ready to mix system)  4.5 g Intravenous Q8H         Physical Findings/Assessment  Oral/Mouth Cavity: tooth/teeth missing  Skin: intact (juliette score 19)    Estimated/Assessed Needs  Weight Used For Calorie Calculations: 75.2 kg (165 lb 12.6 oz)  Height: 6'  Energy Calorie Requirements (kcal): 1335-5201  Energy Need Method: Durham-St Jeor (x1.2)  RMR (Durham-St. Jeor Equation): 1680  Weight Used For Protein Calculations: 75.2 kg (165 lb 12.6 oz)  1.0 gm Protein (gm): 75.36 - 1.2 gm Protein (gm): 90.43  Fluid Need Method: RDA Method (or per MD)  RDA Method (mL): 2000       Nutrition Prescription Ordered  Current Diet Order: Adult regular diet     Evaluation of Received Nutrient/Fluid Intake  Oral Fluid (mL): 360  Tolerance: tolerating  % Intake of Estimated Energy Needs: 75 - 100 %  % Meal Intake: 75 - 100 %    Nutrition Risk  Level of Risk/Frequency of Follow-up:  (x1 weekly)     Assessment and Plan  Nutrition Problem  Altered nutrition related lab values     Related to (etiology):   intubation    Signs and Symptoms (as evidenced by):   Alb 2.9, phos 2.4, BUN 2    Interventions/Recommendations (treatment  strategy):  Encourage PO intake     Nutrition Diagnosis Status:   New         Monitor and Evaluation  Food and Nutrient Intake: food and beverage intake, energy intake  Food and Nutrient Adminstration: diet order  Anthropometric Measurements: weight, weight change, body mass index  Biochemical Data, Medical Tests and Procedures: electrolyte and renal panel, glucose/endocrine profile, lipid profile  Nutrition-Focused Physical Findings: overall appearance     Nutrition Follow-Up  RD Follow-up?: Yes    Nutrition discharge planning: regular diet

## 2018-03-30 LAB
ALBUMIN SERPL BCP-MCNC: 2.9 G/DL
ALP SERPL-CCNC: 53 U/L
ALT SERPL W/O P-5'-P-CCNC: 23 U/L
ANION GAP SERPL CALC-SCNC: 8 MMOL/L
AST SERPL-CCNC: 49 U/L
BASOPHILS # BLD AUTO: 0 K/UL
BASOPHILS NFR BLD: 0.6 %
BILIRUB SERPL-MCNC: 2.1 MG/DL
BUN SERPL-MCNC: 2 MG/DL
CALCIUM SERPL-MCNC: 8.4 MG/DL
CHLORIDE SERPL-SCNC: 111 MMOL/L
CO2 SERPL-SCNC: 20 MMOL/L
CREAT SERPL-MCNC: 0.8 MG/DL
DIFFERENTIAL METHOD: ABNORMAL
EOSINOPHIL # BLD AUTO: 0.1 K/UL
EOSINOPHIL NFR BLD: 2.1 %
ERYTHROCYTE [DISTWIDTH] IN BLOOD BY AUTOMATED COUNT: 14 %
EST. GFR  (AFRICAN AMERICAN): >60 ML/MIN/1.73 M^2
EST. GFR  (NON AFRICAN AMERICAN): >60 ML/MIN/1.73 M^2
GLUCOSE SERPL-MCNC: 125 MG/DL
HCT VFR BLD AUTO: 36.5 %
HGB BLD-MCNC: 12.6 G/DL
INR PPP: 1.2
LYMPHOCYTES # BLD AUTO: 1.9 K/UL
LYMPHOCYTES NFR BLD: 31.8 %
MAGNESIUM SERPL-MCNC: 1.8 MG/DL
MCH RBC QN AUTO: 29.9 PG
MCHC RBC AUTO-ENTMCNC: 34.4 G/DL
MCV RBC AUTO: 87 FL
MONOCYTES # BLD AUTO: 0.4 K/UL
MONOCYTES NFR BLD: 6.7 %
NEUTROPHILS # BLD AUTO: 3.5 K/UL
NEUTROPHILS NFR BLD: 58.8 %
PHOSPHATE SERPL-MCNC: 2.8 MG/DL
PLATELET # BLD AUTO: 84 K/UL
PMV BLD AUTO: 9.6 FL
POTASSIUM SERPL-SCNC: 2.9 MMOL/L
PROT SERPL-MCNC: 5.7 G/DL
PROTHROMBIN TIME: 11.9 SEC
RBC # BLD AUTO: 4.2 M/UL
SODIUM SERPL-SCNC: 139 MMOL/L
WBC # BLD AUTO: 5.9 K/UL

## 2018-03-30 PROCEDURE — C9113 INJ PANTOPRAZOLE SODIUM, VIA: HCPCS | Performed by: INTERNAL MEDICINE

## 2018-03-30 PROCEDURE — 25000003 PHARM REV CODE 250: Performed by: PSYCHIATRY & NEUROLOGY

## 2018-03-30 PROCEDURE — 63600175 PHARM REV CODE 636 W HCPCS: Performed by: PSYCHIATRY & NEUROLOGY

## 2018-03-30 PROCEDURE — 85025 COMPLETE CBC W/AUTO DIFF WBC: CPT

## 2018-03-30 PROCEDURE — 63600175 PHARM REV CODE 636 W HCPCS: Performed by: INTERNAL MEDICINE

## 2018-03-30 PROCEDURE — 94761 N-INVAS EAR/PLS OXIMETRY MLT: CPT

## 2018-03-30 PROCEDURE — 12000002 HC ACUTE/MED SURGE SEMI-PRIVATE ROOM

## 2018-03-30 PROCEDURE — 84100 ASSAY OF PHOSPHORUS: CPT

## 2018-03-30 PROCEDURE — 83735 ASSAY OF MAGNESIUM: CPT

## 2018-03-30 PROCEDURE — 80053 COMPREHEN METABOLIC PANEL: CPT

## 2018-03-30 PROCEDURE — 99232 SBSQ HOSP IP/OBS MODERATE 35: CPT | Mod: ,,, | Performed by: INTERNAL MEDICINE

## 2018-03-30 PROCEDURE — 25000003 PHARM REV CODE 250: Performed by: INTERNAL MEDICINE

## 2018-03-30 PROCEDURE — 36415 COLL VENOUS BLD VENIPUNCTURE: CPT

## 2018-03-30 PROCEDURE — 85610 PROTHROMBIN TIME: CPT

## 2018-03-30 RX ORDER — AMOXICILLIN AND CLAVULANATE POTASSIUM 875; 125 MG/1; MG/1
1 TABLET, FILM COATED ORAL EVERY 12 HOURS
Status: DISCONTINUED | OUTPATIENT
Start: 2018-03-30 | End: 2018-04-03 | Stop reason: HOSPADM

## 2018-03-30 RX ORDER — POTASSIUM CHLORIDE 20 MEQ/1
40 TABLET, EXTENDED RELEASE ORAL EVERY 6 HOURS
Status: COMPLETED | OUTPATIENT
Start: 2018-03-30 | End: 2018-03-30

## 2018-03-30 RX ORDER — CLONIDINE HYDROCHLORIDE 0.1 MG/1
0.1 TABLET ORAL EVERY 8 HOURS
Status: DISCONTINUED | OUTPATIENT
Start: 2018-03-30 | End: 2018-04-03

## 2018-03-30 RX ORDER — RISPERIDONE 0.25 MG/1
0.5 TABLET ORAL 2 TIMES DAILY
Status: DISCONTINUED | OUTPATIENT
Start: 2018-03-30 | End: 2018-04-03 | Stop reason: HOSPADM

## 2018-03-30 RX ORDER — THIAMINE HCL 100 MG
100 TABLET ORAL DAILY
Status: DISCONTINUED | OUTPATIENT
Start: 2018-03-30 | End: 2018-04-03 | Stop reason: HOSPADM

## 2018-03-30 RX ORDER — MAGNESIUM SULFATE HEPTAHYDRATE 500 MG/ML
1 INJECTION, SOLUTION INTRAMUSCULAR; INTRAVENOUS ONCE
Status: COMPLETED | OUTPATIENT
Start: 2018-03-30 | End: 2018-03-30

## 2018-03-30 RX ORDER — ASCORBIC ACID 500 MG
500 TABLET ORAL 3 TIMES DAILY
Status: DISCONTINUED | OUTPATIENT
Start: 2018-03-30 | End: 2018-04-03 | Stop reason: HOSPADM

## 2018-03-30 RX ADMIN — PANTOPRAZOLE SODIUM 40 MG: 40 INJECTION, POWDER, FOR SOLUTION INTRAVENOUS at 09:03

## 2018-03-30 RX ADMIN — ENOXAPARIN SODIUM 40 MG: 100 INJECTION SUBCUTANEOUS at 04:03

## 2018-03-30 RX ADMIN — PIPERACILLIN SODIUM AND TAZOBACTAM SODIUM 4.5 G: 4; .5 INJECTION, POWDER, FOR SOLUTION INTRAVENOUS at 03:03

## 2018-03-30 RX ADMIN — POTASSIUM CHLORIDE 40 MEQ: 20 TABLET, EXTENDED RELEASE ORAL at 10:03

## 2018-03-30 RX ADMIN — Medication 100 MG: at 04:03

## 2018-03-30 RX ADMIN — CLONIDINE HYDROCHLORIDE 0.1 MG: 0.1 TABLET ORAL at 10:03

## 2018-03-30 RX ADMIN — POTASSIUM CHLORIDE 40 MEQ: 20 TABLET, EXTENDED RELEASE ORAL at 09:03

## 2018-03-30 RX ADMIN — POTASSIUM CHLORIDE 40 MEQ: 20 TABLET, EXTENDED RELEASE ORAL at 04:03

## 2018-03-30 RX ADMIN — OXYCODONE HYDROCHLORIDE AND ACETAMINOPHEN 500 MG: 500 TABLET ORAL at 10:03

## 2018-03-30 RX ADMIN — OXYCODONE HYDROCHLORIDE AND ACETAMINOPHEN 500 MG: 500 TABLET ORAL at 04:03

## 2018-03-30 RX ADMIN — MAGNESIUM SULFATE HEPTAHYDRATE 1 G: 500 INJECTION, SOLUTION INTRAMUSCULAR; INTRAVENOUS at 04:03

## 2018-03-30 RX ADMIN — AMOXICILLIN AND CLAVULANATE POTASSIUM 1 TABLET: 875; 125 TABLET, FILM COATED ORAL at 09:03

## 2018-03-30 RX ADMIN — RISPERIDONE 0.5 MG: 0.25 TABLET ORAL at 10:03

## 2018-03-30 RX ADMIN — AMOXICILLIN AND CLAVULANATE POTASSIUM 1 TABLET: 875; 125 TABLET, FILM COATED ORAL at 10:03

## 2018-03-30 NOTE — PLAN OF CARE
03/29/18 2003   Patient Assessment/Suction   Level of Consciousness (AVPU) alert   PRE-TX-O2-ETCO2   O2 Device (Oxygen Therapy) room air   SpO2 97 %   Pulse Oximetry Type Intermittent   Pulse 92   Resp 16

## 2018-03-30 NOTE — PROGRESS NOTES
Results for JOHN REYES JR. (MRN [id number]) as of 3/30/2018 08:23   Ref. Range 3/30/2018 05:24   Potassium Latest Ref Range: 3.5 - 5.1 mmol/L 2.9 (L)     Dr. Santos notified of the above lab result.  New orders received.

## 2018-03-30 NOTE — PLAN OF CARE
03/30/18 1730   Discharge Assessment   Assessment Type Discharge Planning Reassessment   CM met with patient and mother at bedside to address SS consult regarding Dr. Mars's recommendation of inpatient psych. Patient does not want to willingly go to inpatient psych. CM did explain to patient that doctor will most likely write order for PEC since Dr. Mars made recommendation. Patient and mother are familiar with PEC process, patient has had PEC in the past with HX of bipolar illness.

## 2018-03-30 NOTE — PROGRESS NOTES
Asked Christy BARNES to give ativan push for pt restlessness. He exposed himself to her. She told him to stop being inappropriate and to cover his self up. He pulled his covers up.

## 2018-03-30 NOTE — PROGRESS NOTES
Progress Note  Hospital Medicine  Patient Name:Christiano Gomez Jr.  MRN:  [id number]  Patient Class: IP- Inpatient  Admit Date: 3/27/2018  Length of Stay: 3 days  Expected Discharge Date:   Attending Physician: Oly Santos MD  Primary Care Provider:  Primary Doctor No    SUBJECTIVE:     Principal Problem: Unresponsive  Initial history of present illness: Patient is a 42 y.o. male admitted to Hospitalist Service from [facility] Emergency Room with complaint of unresponsive and on ventilator support. [facility] is on ICU diversion. Patient reportedly has past medical history significant for Bipolar disorder, Chronic hepatitis C, multiple psych hospitalization and Polysubstance abuse. Part of the history obtained from patient's mother who lives next door to the patient. According to the mother, she was patient coming out of shed with device made of Sprite bottle which she suspects used for smoking illicit substance. She knows he reqularly smokes Weed but also suspects could be anything. Patient appeared confused, his eyes rolled back and strange sound came from his mouth and feel on the floor and started to seize. Full body shaking reported, foaming at mouth with urinary incontinence. No prior history of seizure reported. This lasted few seconds and patient woke up prior to arrival of EMS. Patient received 5 mg Versed IV and nasally intubated. Prior to transfer patient underwent CTA of head and neck at directions by Dr. Arias. I requested 2 ampules of sodium bicarbonate to be given prior to transfer which reportedly not given. Further details are not available.    PMH/PSH/SH/FH/Meds: reviewed.    Symptoms/Review of Systems: Flat affect, poor insight, tangentional thinking. Father at bedside. Mood stable. No acute distress. Admits smoking THC on regular basis. Denied any depression, SI or HI. Father at bedisde.  Diet: Regular  Activity level: Ad marty  Pain: NAD    OBJECTIVE:   Vital Signs (Most Recent):      Temp:  97.8 °F (36.6 °C) (03/30/18 0400)  Pulse: 70 (03/30/18 0400)  Resp: 16 (03/30/18 0400)  BP: (!) 114/56 (03/30/18 0400)  SpO2: 96 % (03/30/18 0400)       Vital Signs Range (Last 24H):  Temp:  [97.8 °F (36.6 °C)-99.5 °F (37.5 °C)]   Pulse:  [70-94]   Resp:  [16-38]   BP: ()/(56-75)   SpO2:  [95 %-99 %]     Weight: 75.2 kg (165 lb 12.6 oz)  Body mass index is 22.48 kg/m².    Intake/Output Summary (Last 24 hours) at 03/30/18 0658  Last data filed at 03/30/18 0500   Gross per 24 hour   Intake             1315 ml   Output             1100 ml   Net              215 ml     Physical Examination:  General appearance: well developed, appears stated age  Head: normocephalic, atraumatic  Eyes:  conjunctivae/corneas clear. PERRL.  Nose: Nares normal. Septum midline.  Throat: lips, mucosa, and tongue normal; teeth and gums normal, no throat erythema.  Neck: supple, symmetrical, trachea midline, no JVD and thyroid not enlarged, symmetric, no tenderness/mass/nodules  Lungs:  clear to auscultation bilaterally and normal respiratory effort  Chest wall: no tenderness  Heart: regular rate and rhythm, S1, S2 normal, no murmur, click, rub or gallop  Abdomen: soft, non-tender non-distented; bowel sounds normal; no masses,  no organomegaly  Extremities: no cyanosis, clubbing or edema.   Pulses: 2+ and symmetric  Skin: Skin color, texture, turgor normal. No rashes or lesions.  Lymph nodes: Cervical, supraclavicular, and axillary nodes normal.  Neurologic: Grossly non-focal     CBC:    Recent Labs  Lab 03/28/18  0604 03/29/18  0427 03/30/18  0524   WBC 12.00 6.60 5.90   RBC 4.46* 4.15* 4.20*   HGB 13.4* 12.4* 12.6*   HCT 38.7* 36.3* 36.5*   * 84* 84*   MCV 87 88 87   MCH 30.0 29.9 29.9   MCHC 34.6 34.2 34.4   BMP    Recent Labs  Lab 03/28/18  0604 03/29/18  0427 03/29/18  1015 03/29/18  1533   * 109 98  --     140 140  --    K 2.8* 2.6* 3.3* 3.5   * 112* 113*  --    CO2 18* 19* 19*  --    BUN 5* 3* 2*  --     CREATININE 0.9 0.8 0.7  --    CALCIUM 8.2* 8.0* 8.2*  --    MG 2.5 2.0  --   --       Diagnostic Results:  Microbiology Results (last 7 days)     ** No results found for the last 168 hours. **         Chest X-Ray: No acute pulmonary infiltrate reported on [facility] records.     CT Head: No acute intra-cranial process.     CT C-spine: Multi-level DJD changes, no acute osseous abnormality.     CTA of head and neck:Mild bilateral atheromatous changes without findings of significant luminal stenosis. No significant abnormality on CTA of brain.     EKG: NSR, 96, Prolonged cQT 485, no acute changes.    MRI Brain:  No acute intracranial abnormality is seen.  Motion limited examination.    EEG: This is an abnormal EEG during wakefulness, drowsiness and sleep.    Right posterior temporal intermittent focal slowing was noted.    Assessment/Plan:      *Unresponsive [R41.89]   Yes    Altered mental status [R41.82]  Polysubstance abuse [F19.10] - unknown substance, THC abuse  Continue supplemental oxygen to keep PO2 above 92%. Follow Dr. Michael's recommendations.  Continue beta 2 agonist bronchodilator treatments.   Change Zosyn to PO Augmentin.  Check sputum GS and Cx.   Neurochecks     Metabolic acidosis [E87.2] - related to sepsis Vs seizure activity - better  Severe sepsis [A41.9, R65.20]  DC IVF, eating and drinking well. Follow microbiology.  On PO Augmentin am.      Yes    Bipolar affective disorder [F31.9]  Await Dr. Mars evaluation. Patient has had multiple prior psych hospitalization.      Yes    Seizures [R56.9]  Since EEG and MRI are unremarkable, DC Raghavendra as per Dr. Hunt recommendations.  Seizure and fall precautions.      Yes      Hypokalemia  Replete KCl. Follow level.    Hypophosphatemia  Replete Phosphorus. Follow level.          Chronic hepatitis C [B18.2]  Trend LFTs.   Yes                       Discussed with patient's father and answered all the questions.     VTE Risk Mitigation         Ordered      enoxaparin injection 40 mg  Daily     Route:  Subcutaneous        03/27/18 1808     IP VTE HIGH RISK PATIENT  Once      03/27/18 1808        Oly Santos MD  Department of Hospital Medicine   [facility]

## 2018-03-31 LAB
ALBUMIN SERPL BCP-MCNC: 3 G/DL
ALP SERPL-CCNC: 55 U/L
ALT SERPL W/O P-5'-P-CCNC: 26 U/L
ANION GAP SERPL CALC-SCNC: 7 MMOL/L
AST SERPL-CCNC: 44 U/L
BASOPHILS # BLD AUTO: 0 K/UL
BASOPHILS NFR BLD: 0.1 %
BILIRUB SERPL-MCNC: 2.1 MG/DL
BUN SERPL-MCNC: 3 MG/DL
CALCIUM SERPL-MCNC: 8.7 MG/DL
CHLORIDE SERPL-SCNC: 110 MMOL/L
CO2 SERPL-SCNC: 21 MMOL/L
CREAT SERPL-MCNC: 0.7 MG/DL
DIFFERENTIAL METHOD: ABNORMAL
EOSINOPHIL # BLD AUTO: 0.2 K/UL
EOSINOPHIL NFR BLD: 4.4 %
ERYTHROCYTE [DISTWIDTH] IN BLOOD BY AUTOMATED COUNT: 14 %
EST. GFR  (AFRICAN AMERICAN): >60 ML/MIN/1.73 M^2
EST. GFR  (NON AFRICAN AMERICAN): >60 ML/MIN/1.73 M^2
GLUCOSE SERPL-MCNC: 88 MG/DL
HCT VFR BLD AUTO: 37.7 %
HGB BLD-MCNC: 13.1 G/DL
INR PPP: 1.1
LYMPHOCYTES # BLD AUTO: 2 K/UL
LYMPHOCYTES NFR BLD: 36.8 %
MAGNESIUM SERPL-MCNC: 2.1 MG/DL
MCH RBC QN AUTO: 30.3 PG
MCHC RBC AUTO-ENTMCNC: 34.7 G/DL
MCV RBC AUTO: 87 FL
MONOCYTES # BLD AUTO: 0.4 K/UL
MONOCYTES NFR BLD: 7.5 %
NEUTROPHILS # BLD AUTO: 2.8 K/UL
NEUTROPHILS NFR BLD: 51.2 %
PHOSPHATE SERPL-MCNC: 2.9 MG/DL
PLATELET # BLD AUTO: 104 K/UL
PMV BLD AUTO: 9.5 FL
POTASSIUM SERPL-SCNC: 4.4 MMOL/L
PROT SERPL-MCNC: 6 G/DL
PROTHROMBIN TIME: 11.6 SEC
RBC # BLD AUTO: 4.32 M/UL
SODIUM SERPL-SCNC: 138 MMOL/L
WBC # BLD AUTO: 5.4 K/UL

## 2018-03-31 PROCEDURE — 94761 N-INVAS EAR/PLS OXIMETRY MLT: CPT

## 2018-03-31 PROCEDURE — 36415 COLL VENOUS BLD VENIPUNCTURE: CPT

## 2018-03-31 PROCEDURE — 63600175 PHARM REV CODE 636 W HCPCS: Performed by: INTERNAL MEDICINE

## 2018-03-31 PROCEDURE — 84100 ASSAY OF PHOSPHORUS: CPT

## 2018-03-31 PROCEDURE — 25000003 PHARM REV CODE 250: Performed by: INTERNAL MEDICINE

## 2018-03-31 PROCEDURE — 25000003 PHARM REV CODE 250: Performed by: PSYCHIATRY & NEUROLOGY

## 2018-03-31 PROCEDURE — 80053 COMPREHEN METABOLIC PANEL: CPT

## 2018-03-31 PROCEDURE — 85610 PROTHROMBIN TIME: CPT

## 2018-03-31 PROCEDURE — 83735 ASSAY OF MAGNESIUM: CPT

## 2018-03-31 PROCEDURE — C9113 INJ PANTOPRAZOLE SODIUM, VIA: HCPCS | Performed by: INTERNAL MEDICINE

## 2018-03-31 PROCEDURE — 20000000 HC ICU ROOM

## 2018-03-31 PROCEDURE — 85025 COMPLETE CBC W/AUTO DIFF WBC: CPT

## 2018-03-31 RX ADMIN — CLONIDINE HYDROCHLORIDE 0.1 MG: 0.1 TABLET ORAL at 05:03

## 2018-03-31 RX ADMIN — CLONIDINE HYDROCHLORIDE 0.1 MG: 0.1 TABLET ORAL at 02:03

## 2018-03-31 RX ADMIN — PANTOPRAZOLE SODIUM 40 MG: 40 INJECTION, POWDER, FOR SOLUTION INTRAVENOUS at 08:03

## 2018-03-31 RX ADMIN — RISPERIDONE 0.5 MG: 0.25 TABLET ORAL at 09:03

## 2018-03-31 RX ADMIN — OXYCODONE HYDROCHLORIDE AND ACETAMINOPHEN 500 MG: 500 TABLET ORAL at 09:03

## 2018-03-31 RX ADMIN — ACETAMINOPHEN 650 MG: 325 TABLET ORAL at 08:03

## 2018-03-31 RX ADMIN — CLONIDINE HYDROCHLORIDE 0.1 MG: 0.1 TABLET ORAL at 10:03

## 2018-03-31 RX ADMIN — OXYCODONE HYDROCHLORIDE AND ACETAMINOPHEN 500 MG: 500 TABLET ORAL at 02:03

## 2018-03-31 RX ADMIN — AMOXICILLIN AND CLAVULANATE POTASSIUM 1 TABLET: 875; 125 TABLET, FILM COATED ORAL at 09:03

## 2018-03-31 RX ADMIN — Medication 100 MG: at 08:03

## 2018-03-31 RX ADMIN — RISPERIDONE 0.5 MG: 0.25 TABLET ORAL at 08:03

## 2018-03-31 RX ADMIN — OXYCODONE HYDROCHLORIDE AND ACETAMINOPHEN 500 MG: 500 TABLET ORAL at 08:03

## 2018-03-31 RX ADMIN — AMOXICILLIN AND CLAVULANATE POTASSIUM 1 TABLET: 875; 125 TABLET, FILM COATED ORAL at 08:03

## 2018-03-31 RX ADMIN — ENOXAPARIN SODIUM 40 MG: 100 INJECTION SUBCUTANEOUS at 06:03

## 2018-03-31 NOTE — PLAN OF CARE
Problem: Patient Care Overview  Goal: Plan of Care Review  Outcome: Ongoing (interventions implemented as appropriate)  Pt medically clear, awaiting for available from inpt psych facility. Remains pleasant and cooperative. No inappropriate behavior seen. Safety maintained with risk sitter at door.

## 2018-03-31 NOTE — PLAN OF CARE
received PEC and medical records stating that patient is medically cleared.  Faxed records to multiple mental health units in an attempt to locate a bed for this patient.  Provided hospitals with nurse's telephone number in the event that a bed is located after hours.         03/31/18 3795   Discharge Reassessment   Assessment Type Discharge Planning Reassessment

## 2018-03-31 NOTE — PROGRESS NOTES
Progress Note  Hospital Medicine  Patient Name:Christiano Gomez Jr.  MRN:  [id number]  Patient Class: IP- Inpatient  Admit Date: 3/27/2018  Length of Stay: 4 days  Expected Discharge Date:   Attending Physician: Oly Santos MD  Primary Care Provider:  Primary Doctor No    SUBJECTIVE:     Principal Problem: Unresponsive  Initial history of present illness: Patient is a 42 y.o. male admitted to Hospitalist Service from [facility] Emergency Room with unresponsiveness and on ventilator support. Patient reportedly has past medical history significant for Bipolar disorder, Chronic hepatitis C, multiple psych hospitalization and Polysubstance abuse. According to the mother, she was patient coming out of shed with device made of Sprite bottle which she suspects used for smoking illicit substance. She knows he reqularly smokes weed but also suspects could be anything. Patient appeared confused, his eyes rolled back and strange sound came from his mouth and fell on the floor and started to seize. Full body shaking reported, foaming at mouth with urinary incontinence. No prior history of seizure reported. This lasted few seconds and patient woke up prior to arrival of EMS. Patient received 5 mg Versed IV and intubated. Prior to transfer patient underwent CTA of head and neck at directions by Dr. Arias. Was subsequently extubated and transferred to the floor.    PMH/PSH/SH/FH/Meds: reviewed.    Symptoms/Review of Systems: No acute overnight events. Patient's father at bedside. Reports pain over the right shoulder which is a chronic issue.    Diet: Regular  Activity level: Ad marty  Pain: NAD    OBJECTIVE:   Vital Signs (Most Recent):      Temp: 97.3 °F (36.3 °C) (03/31/18 0739)  Pulse: 77 (03/31/18 0739)  Resp: 18 (03/31/18 0739)  BP: 104/65 (03/31/18 0739)  SpO2: 97 % (03/31/18 0739)       Vital Signs Range (Last 24H):  Temp:  [97.1 °F (36.2 °C)-99.1 °F (37.3 °C)]   Pulse:  [69-91]   Resp:  [17-20]   BP:  (103-119)/(65-72)   SpO2:  [96 %-98 %]     Weight: 75.2 kg (165 lb 12.6 oz)  Body mass index is 22.48 kg/m².    Intake/Output Summary (Last 24 hours) at 03/31/18 0820  Last data filed at 03/30/18 1644   Gross per 24 hour   Intake              480 ml   Output              800 ml   Net             -320 ml     Physical Examination:  General appearance: well developed, appears stated age  Head: normocephalic, atraumatic  Eyes:  conjunctivae/corneas clear. PERRL.  Nose: Nares normal. Throat: lips, mucosa, and tongue normal  Neck: supple, symmetrical, trachea midline, no JVD and thyroid not enlarged, symmetric,  Lungs:  clear to auscultation bilaterally and normal respiratory effort  Chest wall: no tenderness  Heart: regular rate and rhythm, S1, S2 normal, no murmur, click, rub or gallop  Abdomen: soft, non-tender non-distented; bowel sounds normal; no masses  Extremities: no cyanosis, clubbing or edema.  No tenderness over right shoulder  Pulses: 2+ and symmetric  Skin: Skin color, texture, turgor normal. No rashes or lesions.  Neurologic: Grossly non-focal     CBC:    Recent Labs  Lab 03/29/18 0427 03/30/18  0524 03/31/18  0432   WBC 6.60 5.90 5.40   RBC 4.15* 4.20* 4.32*   HGB 12.4* 12.6* 13.1*   HCT 36.3* 36.5* 37.7*   PLT 84* 84* 104*   MCV 88 87 87   MCH 29.9 29.9 30.3   MCHC 34.2 34.4 34.7   BMP    Recent Labs  Lab 03/29/18  0427 03/29/18  1015 03/29/18  1533 03/30/18  0524 03/31/18  0432    98  --  125* 88    140  --  139 138   K 2.6* 3.3* 3.5 2.9* 4.4   * 113*  --  111* 110   CO2 19* 19*  --  20* 21*   BUN 3* 2*  --  2* 3*   CREATININE 0.8 0.7  --  0.8 0.7   CALCIUM 8.0* 8.2*  --  8.4* 8.7   MG 2.0  --   --  1.8 2.1      Diagnostic Results:  Microbiology Results (last 7 days)     ** No results found for the last 168 hours. **         Chest X-Ray: No acute pulmonary infiltrate reported on [facility] records.     CT Head: No acute intra-cranial process.     CT C-spine: Multi-level DJD changes, no acute  osseous abnormality.     CTA of head and neck:Mild bilateral atheromatous changes without findings of significant luminal stenosis. No significant abnormality on CTA of brain.     EKG: NSR, 96, Prolonged cQT 485, no acute changes.    MRI Brain:  No acute intracranial abnormality is seen.  Motion limited examination.    EEG: This is an abnormal EEG during wakefulness, drowsiness and sleep.    Right posterior temporal intermittent focal slowing was noted.    Assessment/Plan:      *Unresponsive [R41.89]   Yes    Altered mental status [R41.82]  Polysubstance abuse [F19.10] - unknown substance, THC abuse  Continue supplemental oxygen to keep PO2 above 92%.   Continue beta 2 agonist bronchodilator treatments.   On PO Augmentin.  Check sputum GS and Cx.   Neurochecks     Metabolic acidosis [E87.2] - related to sepsis Vs seizure activity - better  Severe sepsis [A41.9, R65.20]  Resolved.      Yes    Bipolar affective disorder [F31.9]   Patient has had multiple prior psych hospitalization. Dr Mars has recommended inpatient psych treatment. Case management following.      Yes    Seizures [R56.9]  Since EEG and MRI are unremarkable, CLAUDIA Mabry as per Dr. Hunt recommendations.  Seizure and fall precautions.      Yes      Hypokalemia  Replete KCl. Follow level.    Hypophosphatemia  Replete Phosphorus. Follow level.          Chronic hepatitis C [B18.2]  Trend LFTs.   Yes                       Discussed with patient's father and answered all the questions.     VTE Risk Mitigation         Ordered     enoxaparin injection 40 mg  Daily     Route:  Subcutaneous        03/27/18 1808     IP VTE HIGH RISK PATIENT  Once      03/27/18 1808        Patient is medically cleared to be transferred to an inpatient psychiatric facility.    Boy Feliciano MD  Department of Hospital Medicine   [facility]

## 2018-03-31 NOTE — PROGRESS NOTES
Notified pt's father, Gene, of pt's transfer to ICU room 509.  Explained PEC process and informed him that Case Management is working to obtain placement at an Inpatient Psychiatric facility.  Verbalized understanding.

## 2018-03-31 NOTE — PROGRESS NOTES
Pt transferred to room 509 after being PEC'd. Pt is polite and pleasant. Vital signs stable. Sitting in bed quietly watching T.V. With sitter outside door.

## 2018-03-31 NOTE — PLAN OF CARE
Problem: Fall Risk (Adult)  Intervention: Patient Rounds  Pt rested quietly though night, K+ in normal range this morning. No signs of distress, will continue to monitor.

## 2018-03-31 NOTE — PLAN OF CARE
03/30/18 1928   Patient Assessment/Suction   Level of Consciousness (AVPU) alert   PRE-TX-O2-ETCO2   O2 Device (Oxygen Therapy) room air   SpO2 96 %   Pulse Oximetry Type Intermittent

## 2018-04-01 LAB
ALBUMIN SERPL BCP-MCNC: 3.2 G/DL
ALP SERPL-CCNC: 57 U/L
ALT SERPL W/O P-5'-P-CCNC: 25 U/L
ANION GAP SERPL CALC-SCNC: 8 MMOL/L
AST SERPL-CCNC: 42 U/L
BASOPHILS # BLD AUTO: 0 K/UL
BASOPHILS NFR BLD: 0.3 %
BILIRUB SERPL-MCNC: 1.7 MG/DL
BUN SERPL-MCNC: 6 MG/DL
CALCIUM SERPL-MCNC: 9 MG/DL
CHLORIDE SERPL-SCNC: 107 MMOL/L
CO2 SERPL-SCNC: 25 MMOL/L
CREAT SERPL-MCNC: 0.7 MG/DL
DIFFERENTIAL METHOD: ABNORMAL
EOSINOPHIL # BLD AUTO: 0.3 K/UL
EOSINOPHIL NFR BLD: 5.5 %
ERYTHROCYTE [DISTWIDTH] IN BLOOD BY AUTOMATED COUNT: 14.6 %
EST. GFR  (AFRICAN AMERICAN): >60 ML/MIN/1.73 M^2
EST. GFR  (NON AFRICAN AMERICAN): >60 ML/MIN/1.73 M^2
GLUCOSE SERPL-MCNC: 95 MG/DL
HCT VFR BLD AUTO: 40.2 %
HGB BLD-MCNC: 13.6 G/DL
INR PPP: 1.1
LYMPHOCYTES # BLD AUTO: 2.2 K/UL
LYMPHOCYTES NFR BLD: 38.8 %
MAGNESIUM SERPL-MCNC: 2.1 MG/DL
MCH RBC QN AUTO: 29.8 PG
MCHC RBC AUTO-ENTMCNC: 33.7 G/DL
MCV RBC AUTO: 88 FL
MONOCYTES # BLD AUTO: 0.4 K/UL
MONOCYTES NFR BLD: 7.2 %
NEUTROPHILS # BLD AUTO: 2.8 K/UL
NEUTROPHILS NFR BLD: 48.2 %
PHOSPHATE SERPL-MCNC: 3.5 MG/DL
PLATELET # BLD AUTO: 130 K/UL
PMV BLD AUTO: 9.2 FL
POTASSIUM SERPL-SCNC: 4 MMOL/L
PROT SERPL-MCNC: 6.3 G/DL
PROTHROMBIN TIME: 11.4 SEC
RBC # BLD AUTO: 4.55 M/UL
SODIUM SERPL-SCNC: 140 MMOL/L
WBC # BLD AUTO: 5.7 K/UL

## 2018-04-01 PROCEDURE — 63600175 PHARM REV CODE 636 W HCPCS: Performed by: INTERNAL MEDICINE

## 2018-04-01 PROCEDURE — 85025 COMPLETE CBC W/AUTO DIFF WBC: CPT

## 2018-04-01 PROCEDURE — S4991 NICOTINE PATCH NONLEGEND: HCPCS | Performed by: INTERNAL MEDICINE

## 2018-04-01 PROCEDURE — 94761 N-INVAS EAR/PLS OXIMETRY MLT: CPT

## 2018-04-01 PROCEDURE — 83735 ASSAY OF MAGNESIUM: CPT

## 2018-04-01 PROCEDURE — 84100 ASSAY OF PHOSPHORUS: CPT

## 2018-04-01 PROCEDURE — 25000003 PHARM REV CODE 250

## 2018-04-01 PROCEDURE — 25000003 PHARM REV CODE 250: Performed by: INTERNAL MEDICINE

## 2018-04-01 PROCEDURE — 25000003 PHARM REV CODE 250: Performed by: PSYCHIATRY & NEUROLOGY

## 2018-04-01 PROCEDURE — 80053 COMPREHEN METABOLIC PANEL: CPT

## 2018-04-01 PROCEDURE — 20000000 HC ICU ROOM

## 2018-04-01 PROCEDURE — 63600175 PHARM REV CODE 636 W HCPCS: Performed by: NURSE PRACTITIONER

## 2018-04-01 PROCEDURE — 36415 COLL VENOUS BLD VENIPUNCTURE: CPT

## 2018-04-01 PROCEDURE — 85610 PROTHROMBIN TIME: CPT

## 2018-04-01 RX ORDER — PANTOPRAZOLE SODIUM 40 MG/1
TABLET, DELAYED RELEASE ORAL
Status: COMPLETED
Start: 2018-04-01 | End: 2018-04-01

## 2018-04-01 RX ADMIN — RISPERIDONE 0.5 MG: 0.25 TABLET ORAL at 09:04

## 2018-04-01 RX ADMIN — PANTOPRAZOLE SODIUM 40 MG: 40 TABLET, DELAYED RELEASE ORAL at 09:04

## 2018-04-01 RX ADMIN — ENOXAPARIN SODIUM 40 MG: 100 INJECTION SUBCUTANEOUS at 05:04

## 2018-04-01 RX ADMIN — AMOXICILLIN AND CLAVULANATE POTASSIUM 1 TABLET: 875; 125 TABLET, FILM COATED ORAL at 09:04

## 2018-04-01 RX ADMIN — HYDROMORPHONE HYDROCHLORIDE 1 MG: 2 INJECTION, SOLUTION INTRAMUSCULAR; INTRAVENOUS; SUBCUTANEOUS at 06:04

## 2018-04-01 RX ADMIN — OXYCODONE HYDROCHLORIDE AND ACETAMINOPHEN 500 MG: 500 TABLET ORAL at 09:04

## 2018-04-01 RX ADMIN — OXYCODONE HYDROCHLORIDE AND ACETAMINOPHEN 500 MG: 500 TABLET ORAL at 02:04

## 2018-04-01 RX ADMIN — NICOTINE 1 PATCH: 14 PATCH, EXTENDED RELEASE TRANSDERMAL at 02:04

## 2018-04-01 RX ADMIN — CLONIDINE HYDROCHLORIDE 0.1 MG: 0.1 TABLET ORAL at 02:04

## 2018-04-01 RX ADMIN — Medication 100 MG: at 09:04

## 2018-04-01 RX ADMIN — CLONIDINE HYDROCHLORIDE 0.1 MG: 0.1 TABLET ORAL at 05:04

## 2018-04-01 NOTE — PLAN OF CARE
Problem: Patient Care Overview  Goal: Plan of Care Review  Outcome: Ongoing (interventions implemented as appropriate)  Has been napping and watching TV all day.  Quiet, calm, and cooperative.  Walked with sitter to restroom down the [place].  Said had BM and voided.  Returned to bed.  Tolerated activity well.  Sitter in sight all this shift.

## 2018-04-01 NOTE — PROGRESS NOTES
Progress Note  Hospital Medicine  Patient Name:Christiano Gomez Jr.  MRN:  [id number]  Patient Class: IP- Inpatient  Admit Date: 3/27/2018  Length of Stay: 5 days  Expected Discharge Date:   Attending Physician: Oly Santos MD  Primary Care Provider:  Primary Doctor No    SUBJECTIVE:     Principal Problem: Unresponsive  Initial history of present illness: Patient is a 42 y.o. male admitted to Hospitalist Service from [facility] Emergency Room with unresponsiveness and on ventilator support. Patient reportedly has past medical history significant for Bipolar disorder, Chronic hepatitis C, multiple psych hospitalization and polysubstance abuse. According to the mother, she was patient coming out of shed with a device made up of a Sprite bottle which she suspects used for smoking illicit substance. She knows he reqularly smokes weed but also suspects could be anything. Patient appeared confused, his eyes rolled back and strange sound came from his mouth and fell on the floor and started to seize. Full body shaking reported, foaming at mouth with urinary incontinence. No prior history of seizure reported. This lasted few seconds and patient woke up prior to arrival of EMS. Patient received 5 mg Versed IV and intubated. Prior to transfer patient underwent CTA of head and neck at directions by Dr. Arias. Was subsequently extubated and transferred to the floor.    PMH/PSH/SH/FH/Meds: reviewed.    Symptoms/Review of Systems: Patient was transferred to the ICU overnight for 1:1 monitoring. Denied any acute complaints this morning.     Diet: Regular  Activity level: Ad marty  Pain: NAD    OBJECTIVE:   Vital Signs (Most Recent):      Temp: 98.1 °F (36.7 °C) (04/01/18 0434)  Pulse: 61 (04/01/18 0554)  Resp: 16 (04/01/18 0434)  BP: (!) 92/58 (04/01/18 0554)  SpO2: 96 % (04/01/18 0554)       Vital Signs Range (Last 24H):  Temp:  [98.1 °F (36.7 °C)-98.4 °F (36.9 °C)]   Pulse:  [59-78]   Resp:  [16-22]   BP:  ()/(53-60)   SpO2:  [95 %-98 %]     Weight: 75 kg (165 lb 5.5 oz)  Body mass index is 24.42 kg/m².    Intake/Output Summary (Last 24 hours) at 04/01/18 0823  Last data filed at 04/01/18 0600   Gross per 24 hour   Intake              950 ml   Output                0 ml   Net              950 ml     Physical Examination:  General appearance: well developed, appears stated age  Head: normocephalic, atraumatic  Eyes:  conjunctivae/corneas clear. PERRL.  Nose: Nares normal. Throat: lips, mucosa, and tongue normal  Neck: supple, symmetrical, trachea midline, no JVD  Lungs:  clear to auscultation bilaterally and normal respiratory effort  Chest wall: no tenderness  Heart: regular rate and rhythm, S1, S2 normal, no murmur, click, rub or gallop  Abdomen: soft, non-tender non-distented; bowel sounds normal  Extremities: no cyanosis, clubbing or edema.  No tenderness over right shoulder  Pulses: 2+ and symmetric  Skin: Skin color, texture, turgor normal. No rashes or lesions.  Neurologic: Grossly non-focal     CBC:    Recent Labs  Lab 03/30/18 0524 03/31/18  0432 04/01/18  0330   WBC 5.90 5.40 5.70   RBC 4.20* 4.32* 4.55*   HGB 12.6* 13.1* 13.6*   HCT 36.5* 37.7* 40.2   PLT 84* 104* 130*   MCV 87 87 88   MCH 29.9 30.3 29.8   MCHC 34.4 34.7 33.7   BMP    Recent Labs  Lab 03/30/18 0524 03/31/18  0432 04/01/18  0330   * 88 95    138 140   K 2.9* 4.4 4.0   * 110 107   CO2 20* 21* 25   BUN 2* 3* 6   CREATININE 0.8 0.7 0.7   CALCIUM 8.4* 8.7 9.0   MG 1.8 2.1 2.1      Diagnostic Results:  Microbiology Results (last 7 days)     ** No results found for the last 168 hours. **         Chest X-Ray: No acute pulmonary infiltrate reported on [facility] records.     CT Head: No acute intra-cranial process.     CT C-spine: Multi-level DJD changes, no acute osseous abnormality.     CTA of head and neck:Mild bilateral atheromatous changes without findings of significant luminal stenosis. No significant abnormality on CTA of  brain.     EKG: NSR, 96, Prolonged cQT 485, no acute changes.    MRI Brain:  No acute intracranial abnormality is seen.  Motion limited examination.    EEG: This is an abnormal EEG during wakefulness, drowsiness and sleep.    Right posterior temporal intermittent focal slowing was noted.    Assessment/Plan:      *Unresponsive [R41.89]   Yes    Altered mental status [R41.82]  Polysubstance abuse [F19.10] - unknown substance, THC abuse  Resolved    Continue beta 2 agonist bronchodilator treatments as needed.   On PO Augmentin.     Metabolic acidosis [E87.2] - related to sepsis Vs seizure activity - better  Severe sepsis [A41.9, R65.20]  Resolved.      Yes    Bipolar affective disorder [F31.9]   Patient has had multiple prior psych hospitalization. Dr Mars has recommended inpatient psych treatment. Case management following. Patient transferred to the ICU for 1:1 monitoring.      Yes    Seizures [R56.9]  Since EEG and MRI are unremarkable Keppra has been discontinued as per Dr. Hunt recommendations.  Seizure and fall precautions.      Yes      Hypokalemia  Resolved    Hypophosphatemia  Resolved          Chronic hepatitis C [B18.2]  LFTs normal   Yes                           VTE Risk Mitigation         Ordered     enoxaparin injection 40 mg  Daily     Route:  Subcutaneous        03/27/18 1808     IP VTE HIGH RISK PATIENT  Once      03/27/18 1808        Patient is medically cleared to be transferred to an inpatient psychiatric facility. All labs draws have been discontinued from today.    Boy Feliciano MD  Department of Hospital Medicine   [facility]

## 2018-04-01 NOTE — PLAN OF CARE
Problem: Patient Care Overview  Goal: Plan of Care Review  Outcome: Ongoing (interventions implemented as appropriate)  Alert. Vital signs stable. Resting without c/o voiced. Tolerating liquids. Calm, cooperative. Sitter at door in direct visualization. Plan monitor neuro status. Placement as available in psych facility

## 2018-04-01 NOTE — PLAN OF CARE
contacted every [facility] on resource list provided by case management department.  Faxed medical records to those facilities who reported that they may have a male bed.  Provided facilities with my telephone number and that of the unit in case a bed is located.  Awaiting acceptance.        04/01/18 1234   Discharge Reassessment   Assessment Type Discharge Planning Reassessment

## 2018-04-01 NOTE — PROGRESS NOTES
04/01/18 0855   Patient Assessment/Suction   Level of Consciousness (AVPU) alert   PRE-TX-O2-ETCO2   O2 Device (Oxygen Therapy) room air   SpO2 98 %   Pulse Oximetry Type Intermittent   $ Pulse Oximetry - Multiple Charge Pulse Oximetry - Multiple

## 2018-04-02 PROCEDURE — 94761 N-INVAS EAR/PLS OXIMETRY MLT: CPT

## 2018-04-02 PROCEDURE — C9113 INJ PANTOPRAZOLE SODIUM, VIA: HCPCS | Performed by: INTERNAL MEDICINE

## 2018-04-02 PROCEDURE — 25000003 PHARM REV CODE 250: Performed by: PSYCHIATRY & NEUROLOGY

## 2018-04-02 PROCEDURE — 63600175 PHARM REV CODE 636 W HCPCS: Performed by: INTERNAL MEDICINE

## 2018-04-02 PROCEDURE — 25000003 PHARM REV CODE 250: Performed by: INTERNAL MEDICINE

## 2018-04-02 PROCEDURE — S4991 NICOTINE PATCH NONLEGEND: HCPCS | Performed by: INTERNAL MEDICINE

## 2018-04-02 PROCEDURE — 99232 SBSQ HOSP IP/OBS MODERATE 35: CPT | Mod: ,,, | Performed by: INTERNAL MEDICINE

## 2018-04-02 PROCEDURE — 63600175 PHARM REV CODE 636 W HCPCS: Performed by: NURSE PRACTITIONER

## 2018-04-02 PROCEDURE — 20000000 HC ICU ROOM

## 2018-04-02 RX ADMIN — Medication 100 MG: at 08:04

## 2018-04-02 RX ADMIN — HYDROMORPHONE HYDROCHLORIDE 1 MG: 2 INJECTION, SOLUTION INTRAMUSCULAR; INTRAVENOUS; SUBCUTANEOUS at 11:04

## 2018-04-02 RX ADMIN — PANTOPRAZOLE SODIUM 40 MG: 40 INJECTION, POWDER, FOR SOLUTION INTRAVENOUS at 08:04

## 2018-04-02 RX ADMIN — OXYCODONE HYDROCHLORIDE AND ACETAMINOPHEN 500 MG: 500 TABLET ORAL at 08:04

## 2018-04-02 RX ADMIN — OXYCODONE HYDROCHLORIDE AND ACETAMINOPHEN 500 MG: 500 TABLET ORAL at 02:04

## 2018-04-02 RX ADMIN — NICOTINE 1 PATCH: 14 PATCH, EXTENDED RELEASE TRANSDERMAL at 08:04

## 2018-04-02 RX ADMIN — AMOXICILLIN AND CLAVULANATE POTASSIUM 1 TABLET: 875; 125 TABLET, FILM COATED ORAL at 08:04

## 2018-04-02 RX ADMIN — ENOXAPARIN SODIUM 40 MG: 100 INJECTION SUBCUTANEOUS at 05:04

## 2018-04-02 RX ADMIN — RISPERIDONE 0.5 MG: 0.25 TABLET ORAL at 08:04

## 2018-04-02 RX ADMIN — HYDROMORPHONE HYDROCHLORIDE 1 MG: 2 INJECTION, SOLUTION INTRAMUSCULAR; INTRAVENOUS; SUBCUTANEOUS at 02:04

## 2018-04-02 RX ADMIN — HYDROMORPHONE HYDROCHLORIDE 1 MG: 2 INJECTION, SOLUTION INTRAMUSCULAR; INTRAVENOUS; SUBCUTANEOUS at 08:04

## 2018-04-02 NOTE — PROGRESS NOTES
Progress Note  Hospital Medicine  Patient Name:Christiano Gomez Jr.  MRN:  [id number]  Patient Class: IP- Inpatient  Admit Date: 3/27/2018  Length of Stay: 6 days  Expected Discharge Date:   Attending Physician: Oly Santos MD  Primary Care Provider:  Primary Doctor No    SUBJECTIVE:     Principal Problem: Unresponsive  Initial history of present illness: Patient is a 42 y.o. male admitted to Hospitalist Service from [facility] Emergency Room with complaint of unresponsive and on ventilator support. [facility] is on ICU diversion. Patient reportedly has past medical history significant for Bipolar disorder, Chronic hepatitis C, multiple psych hospitalization and Polysubstance abuse. Part of the history obtained from patient's mother who lives next door to the patient. According to the mother, she was patient coming out of shed with device made of Sprite bottle which she suspects used for smoking illicit substance. She knows he reqularly smokes Weed but also suspects could be anything. Patient appeared confused, his eyes rolled back and strange sound came from his mouth and feel on the floor and started to seize. Full body shaking reported, foaming at mouth with urinary incontinence. No prior history of seizure reported. This lasted few seconds and patient woke up prior to arrival of EMS. Patient received 5 mg Versed IV and nasally intubated. Prior to transfer patient underwent CTA of head and neck at directions by Dr. Arias. I requested 2 ampules of sodium bicarbonate to be given prior to transfer which reportedly not given. Further details are not available.    PMH/PSH/SH/FH/Meds: reviewed.    Symptoms/Review of Systems: No acute distress. Denied any depression, SI or HI. Father at [facility]. Patient under PEC - awaiting inpatient psych placement.  Diet: Regular  Activity level: Ad marty  Pain: NAD    OBJECTIVE:   Vital Signs (Most Recent):      Temp: 97.6 °F (36.4 °C) (04/02/18 0500)  Pulse: 95 (04/02/18  0830)  Resp: 18 (04/02/18 0830)  BP: (!) 108/57 (04/02/18 0830)  SpO2: (!) 94 % (04/02/18 0830)       Vital Signs Range (Last 24H):  Temp:  [97.6 °F (36.4 °C)-98.1 °F (36.7 °C)]   Pulse:  [60-95]   Resp:  [14-18]   BP: ()/(50-57)   SpO2:  [94 %-99 %]     Weight: 73.7 kg (162 lb 7.7 oz)  Body mass index is 23.99 kg/m².    Intake/Output Summary (Last 24 hours) at 04/02/18 0916  Last data filed at 04/01/18 2100   Gross per 24 hour   Intake              460 ml   Output                0 ml   Net              460 ml     Physical Examination:  General appearance: well developed, appears stated age  Head: normocephalic, atraumatic  Eyes:  conjunctivae/corneas clear. PERRL.  Nose: Nares normal. Septum midline.  Throat: lips, mucosa, and tongue normal; teeth and gums normal, no throat erythema.  Neck: supple, symmetrical, trachea midline, no JVD and thyroid not enlarged, symmetric, no tenderness/mass/nodules  Lungs:  clear to auscultation bilaterally and normal respiratory effort  Chest wall: no tenderness  Heart: regular rate and rhythm, S1, S2 normal, no murmur, click, rub or gallop  Abdomen: soft, non-tender non-distented; bowel sounds normal; no masses,  no organomegaly  Extremities: no cyanosis, clubbing or edema.   Pulses: 2+ and symmetric  Skin: Skin color, texture, turgor normal. No rashes or lesions.  Lymph nodes: Cervical, supraclavicular, and axillary nodes normal.  Neurologic: Grossly non-focal     CBC:    Recent Labs  Lab 03/30/18  0524 03/31/18  0432 04/01/18  0330   WBC 5.90 5.40 5.70   RBC 4.20* 4.32* 4.55*   HGB 12.6* 13.1* 13.6*   HCT 36.5* 37.7* 40.2   PLT 84* 104* 130*   MCV 87 87 88   MCH 29.9 30.3 29.8   MCHC 34.4 34.7 33.7   BMP    Recent Labs  Lab 03/30/18  0524 03/31/18  0432 04/01/18  0330   * 88 95    138 140   K 2.9* 4.4 4.0   * 110 107   CO2 20* 21* 25   BUN 2* 3* 6   CREATININE 0.8 0.7 0.7   CALCIUM 8.4* 8.7 9.0   MG 1.8 2.1 2.1      Diagnostic Results:  Microbiology  Results (last 7 days)     ** No results found for the last 168 hours. **         Chest X-Ray: No acute pulmonary infiltrate reported on [facility] records.     CT Head: No acute intra-cranial process.     CT C-spine: Multi-level DJD changes, no acute osseous abnormality.     CTA of head and neck:Mild bilateral atheromatous changes without findings of significant luminal stenosis. No significant abnormality on CTA of brain.     EKG: NSR, 96, Prolonged cQT 485, no acute changes.    MRI Brain:  No acute intracranial abnormality is seen.  Motion limited examination.    EEG: This is an abnormal EEG during wakefulness, drowsiness and sleep.    Right posterior temporal intermittent focal slowing was noted.    Assessment/Plan:      *Unresponsive [R41.89]   Yes    Altered mental status [R41.82]  Polysubstance abuse [F19.10] - unknown substance, THC abuse  On PO Augmentin.  Check sputum GS and Cx.   Neurochecks     Metabolic acidosis [E87.2] - related to sepsis Vs seizure activity - better  Severe sepsis [A41.9, R65.20]  On PO Augmentin am.      Yes    Bipolar affective disorder [F31.9]  Await inpatient psych placement as recommended by Dr. Mars.      Yes    Seizures [R56.9]  Since EEG and MRI are unremarkable, DC Keppra as per Dr. Hunt recommendations.  Seizure and fall precautions.      Yes      Hypokalemia  Follow level.    Hypophosphatemia  Follow level.          Chronic hepatitis C [B18.2]  Trend LFTs.   Yes                       Discussed with patient's father and answered all the questions. Patient encouraged to get out of bed. Patient is medically cleared for inpatient psych placement. Discussed with CM.    VTE Risk Mitigation         Ordered     enoxaparin injection 40 mg  Daily     Route:  Subcutaneous        03/27/18 1808     IP VTE HIGH RISK PATIENT  Once      03/27/18 1808        Oly Santos MD  Department of Hospital Medicine   [facility]

## 2018-04-02 NOTE — PLAN OF CARE
"Problem: Patient Care Overview  Goal: Plan of Care Review  Pt VSS. Pt complained of pain to side "around the kidney area." Pt encouraged to move around room and sit in chair instead of remaining in bed. Pt states that it "hurts" when he moves. RN explained that moving more will help to decrease pain. Pt verbalized understanding. Pt given dilaudid x 1 during shift. Pt has been calm and cooperative during shift. PEC sitter remained at bedside at all times. Pt voiding well.       "

## 2018-04-02 NOTE — CONSULTS
"HISTORY OF PRESENT ILLNESS:  The patient is a 42-year-old white male whose   mailing address is [address] [place] [address].  His residential telephone   number is [phone].  Information obtained from the file as well as from the   patient's mother and the patient, to sum up, the patient was reported to be   smoking marijuana in his garage and as his mother came out of the house, he   approached his mother and soon thereafter, he fell on his face, having seizure.    Subsequently, the patient was brought to the Emergency Room of [facility] where he was found unresponsive and on the ventilator.  The patient   states that he does not remember anything.  He says he has no idea when he was   brought to the hospital and how long he has been here.  The patient's mother   states that the patient is markedly confused that he makes certain kind of faces   when he makes eye contact with his mother and mother states that when he wants   to talk to her, his face shows no emotions and the conversation becomes   confusing.  The patient states that he does have mood swings up and down, the up   feeling he describes as having racing thoughts like "manic" and when he is on   the down side, he feels sad, depressed.  Denied any crying spells or having any   suicidal or homicidal thoughts.    PAST PSYCHIATRIC HISTORY:  The patient denies any suicide attempt in the past.    He says that he is followed by a psychiatrist on an outpatient basis and   reportedly is on Wellbutrin, Zoloft and trazodone.    FAMILY HISTORY:  Positive for bipolar disorder.  The patient's maternal   grandmother suffered from it.    SOCIAL HISTORY:  The patient's parents are both alive in their 60s.  He is the   only child of the family.  The patient is  and has two children.  He   does admit to marijuana use.    MEDICAL AND PHYSICAL ILLNESSES:  He suffers from chronic hepatitis C.    ALLERGIES:  None reported.    CURRENT " PSYCHOTROPICS:  Ativan p.r.n.    MENTAL STATUS EXAMINATION:  A 42-year-old white male, lying in bed, had   considerable difficulty giving information.  He is alert, inattentive,   cooperative. Orientation:  He does not know what today's date is, he believes it   is 04/17/2017.  He is 42 years old, and his YOB: 1975.  Mr. Estes is the current president.  His mood is sad.  Affect is flat.  He denies   any auditory or visual hallucinations.  Denies any homicide or suicide intent.    The patient's recent memory is 3 out of 4 words after 1 minute and 0 out of 4   words after 5 minutes.  Serial sevens are inadequate 100 minus 7 is 103.  The   patient's insight is none and judgment is impaired.    IMPRESSION:  AXIS I:  Psychosis, not otherwise specified, etiology undetermined.  Rule out   between toxic psychosis related to the drug use versus bipolar disorder, severe   with psychosis.  AXIS II:  Undiagnosed.  AXIS III:  Status post respiratory arrest, multiple substance abuse history,   which subsequently required psychiatric hospitalization.  AXIS IV:  Health noncompliance.  AXIS V:  About 30.    RECOMMENDATIONS AND PLAN:  The patient is unstable from the point of Psychiatry   and requires inpatient psychiatric treatment.  If the patient resists my belief,   PEC could be exercised.  For the time being add cranberry juice 8 ounces p.o.   q.i.d., vitamin C 500 mg t.i.d., Risperdal 0.5 mg twice a day, seizure   precautions.          /kenya [id number] beronica(s)        CAPO  dd: 03/30/2018 13:04:19 (CDT)  td: 03/30/2018 20:32:52 (CDT)  Doc ID   #[id number]  Job ID #[id number]    CC:

## 2018-04-02 NOTE — PLAN OF CARE
Sent PEC, H&P, psych consult, progress note, labs, mar, and radiology to the following psych facilities via [employer]:    * [facility]  * [facility]  * [facility]  * [facility]  * [facility]  * [facility]  * [facility]  * [facility]  * [facility]  * [place]  * [facility]  * [facility]  * [facility]  * [facility]  * [facility]  * [facility]  * [facility]

## 2018-04-02 NOTE — PLAN OF CARE
04/02/18 1628   Discharge Assessment   Assessment Type Discharge Planning Reassessment   CM received call from Ajith @  [facility] [phone] questions answered. He will send to medical for record review  and get back with CM.

## 2018-04-02 NOTE — PROGRESS NOTES
Awake and alert. Visiting with mother. Calm and cooperative. Voiced relief from pain with pain medication given around 6pm. VSS. Resp even and unlabored on room air. No distress noted.

## 2018-04-02 NOTE — PLAN OF CARE
Problem: Patient Care Overview  Goal: Plan of Care Review  Outcome: Ongoing (interventions implemented as appropriate)  Visited with mother at beginning of shift. Drinking well. Medicated x 1 with dilaudid for c/o generalized pain. Voiced effectiveness. Rested well, easily aroused. Follows commands.clonidine held at 2200 and 0600 with BP 91/50. No fall or injury this shift. PEC in effect. 1:1 with constant observation for safety.

## 2018-04-03 VITALS
TEMPERATURE: 98 F | SYSTOLIC BLOOD PRESSURE: 105 MMHG | HEIGHT: 69 IN | BODY MASS INDEX: 24.07 KG/M2 | HEART RATE: 69 BPM | OXYGEN SATURATION: 99 % | RESPIRATION RATE: 17 BRPM | WEIGHT: 162.5 LBS | DIASTOLIC BLOOD PRESSURE: 65 MMHG

## 2018-04-03 PROCEDURE — S4991 NICOTINE PATCH NONLEGEND: HCPCS | Performed by: INTERNAL MEDICINE

## 2018-04-03 PROCEDURE — 99239 HOSP IP/OBS DSCHRG MGMT >30: CPT | Mod: ,,, | Performed by: INTERNAL MEDICINE

## 2018-04-03 PROCEDURE — C9113 INJ PANTOPRAZOLE SODIUM, VIA: HCPCS | Performed by: INTERNAL MEDICINE

## 2018-04-03 PROCEDURE — 25000003 PHARM REV CODE 250: Performed by: INTERNAL MEDICINE

## 2018-04-03 PROCEDURE — 25000003 PHARM REV CODE 250: Performed by: PSYCHIATRY & NEUROLOGY

## 2018-04-03 PROCEDURE — 63600175 PHARM REV CODE 636 W HCPCS: Performed by: INTERNAL MEDICINE

## 2018-04-03 PROCEDURE — 63600175 PHARM REV CODE 636 W HCPCS: Performed by: NURSE PRACTITIONER

## 2018-04-03 PROCEDURE — 95819 EEG AWAKE AND ASLEEP: CPT

## 2018-04-03 RX ORDER — ASCORBIC ACID 500 MG
500 TABLET ORAL 3 TIMES DAILY
COMMUNITY
Start: 2018-04-03 | End: 2023-09-26

## 2018-04-03 RX ORDER — HYDROCODONE BITARTRATE AND ACETAMINOPHEN 10; 325 MG/1; MG/1
1 TABLET ORAL EVERY 6 HOURS PRN
Refills: 0
Start: 2018-04-03 | End: 2023-09-26

## 2018-04-03 RX ORDER — PANTOPRAZOLE SODIUM 40 MG/1
40 TABLET, DELAYED RELEASE ORAL DAILY
Status: DISCONTINUED | OUTPATIENT
Start: 2018-04-03 | End: 2018-04-03 | Stop reason: HOSPADM

## 2018-04-03 RX ORDER — LANOLIN ALCOHOL/MO/W.PET/CERES
100 CREAM (GRAM) TOPICAL DAILY
COMMUNITY
Start: 2018-04-04 | End: 2023-09-26

## 2018-04-03 RX ORDER — HYDROCODONE BITARTRATE AND ACETAMINOPHEN 10; 325 MG/1; MG/1
1 TABLET ORAL EVERY 6 HOURS PRN
Status: DISCONTINUED | OUTPATIENT
Start: 2018-04-03 | End: 2018-04-03 | Stop reason: HOSPADM

## 2018-04-03 RX ORDER — AMOXICILLIN AND CLAVULANATE POTASSIUM 875; 125 MG/1; MG/1
1 TABLET, FILM COATED ORAL EVERY 12 HOURS
Qty: 8 TABLET | Refills: 0
Start: 2018-04-03 | End: 2018-04-07

## 2018-04-03 RX ADMIN — HYDROMORPHONE HYDROCHLORIDE 1 MG: 2 INJECTION, SOLUTION INTRAMUSCULAR; INTRAVENOUS; SUBCUTANEOUS at 08:04

## 2018-04-03 RX ADMIN — Medication 100 MG: at 08:04

## 2018-04-03 RX ADMIN — AMOXICILLIN AND CLAVULANATE POTASSIUM 1 TABLET: 875; 125 TABLET, FILM COATED ORAL at 08:04

## 2018-04-03 RX ADMIN — NICOTINE 1 PATCH: 14 PATCH, EXTENDED RELEASE TRANSDERMAL at 08:04

## 2018-04-03 RX ADMIN — HYDROCODONE BITARTRATE AND ACETAMINOPHEN 1 TABLET: 10; 325 TABLET ORAL at 01:04

## 2018-04-03 RX ADMIN — PANTOPRAZOLE SODIUM 40 MG: 40 INJECTION, POWDER, FOR SOLUTION INTRAVENOUS at 08:04

## 2018-04-03 RX ADMIN — OXYCODONE HYDROCHLORIDE AND ACETAMINOPHEN 500 MG: 500 TABLET ORAL at 08:04

## 2018-04-03 RX ADMIN — RISPERIDONE 0.5 MG: 0.25 TABLET ORAL at 08:04

## 2018-04-03 NOTE — DISCHARGE SUMMARY
Discharge Summary  Hospital Medicine    Admit Date: 3/27/2018    Date and Time: 4/3/75753:33 PM    Discharge Attending Physician: Oly Santos MD    Primary Care Physician: Primary Doctor No    Diagnoses:  Active Hospital Problems    Diagnosis  POA    *Unresponsive [R41.89]  Yes    Acute respiratory failure [J96.00]  Yes    Altered mental status [R41.82]  Yes    Bipolar affective disorder [F31.9]  Yes    Seizures [R56.9]  Yes    Polysubstance abuse [F19.10]  Yes    Chronic hepatitis C [B18.2]  Yes    Metabolic acidosis [E87.2]  Yes    Severe sepsis [A41.9, R65.20]  Yes      Resolved Hospital Problems    Diagnosis Date Resolved POA   No resolved problems to display.     Discharged Condition: Good    Hospital Course:   Patient is a 42 y.o. male admitted to Hospitalist Service from [facility] Emergency Room with complaint of unresponsive and on ventilator support. [facility] is on ICU diversion. Patient reportedly has past medical history significant for Bipolar disorder, Chronic hepatitis C, multiple psych hospitalization and Polysubstance abuse. Part of the history obtained from patient's mother who lives next door to the patient. According to the mother, she was patient coming out of shed with device made of Sprite bottle which she suspects used for smoking illicit substance. She knows he reqularly smokes Weed but also suspected could be anything. Patient appeared confused, his eyes rolled back and strange sound came from his mouth and feel on the floor and started to seize. Full body shaking reported, foaming at mouth with urinary incontinence. No prior history of seizure reported. This lasted few seconds and patient woke up prior to arrival of EMS. Patient received 5 mg Versed IV and nasally intubated. Prior to transfer patient underwent CTA of head and neck at directions by Dr. Arias. I requested 2 ampules of sodium bicarbonate to be given prior to transfer which reportedly not given. Further  details are not available. Patient was admitted to Hospitalist medicine service. Patient was evaluated by Dr. Michael. Patient managed in ICU and successfully extubated. Patient treated for aspiration pneumonitis with antibiotics. Patient counseled to void use of illicits,  Dr. Mars recommended in-patient psych placement. Patient PEC and accepted at [facility]. Patient was discharged to [facility] in stable condition with following discharge plan of care. Total time with the patient was 30 minutes and greater than 50% was spent in counseling and coordination of care. The assessment and plan have been discussed at length. Physicians' notes reviewed. Labs and procedure reviewed.     Consults: Dr. Mars, Dr. Michael    Significant Diagnostic Studies:   Chest X-Ray: No acute pulmonary infiltrate reported on [facility] records.     CT Head: No acute intra-cranial process.     CT C-spine: Multi-level DJD changes, no acute osseous abnormality.     CTA of head and neck:Mild bilateral atheromatous changes without findings of significant luminal stenosis. No significant abnormality on CTA of brain.     EKG: NSR, 96, Prolonged cQT 485, no acute changes.     MRI Brain:  No acute intracranial abnormality is seen.  Motion limited examination.     EEG: This is an abnormal EEG during wakefulness, drowsiness and sleep.      Right posterior temporal intermittent focal slowing was noted.    Microbiology Results (last 7 days)     ** No results found for the last 168 hours. **        Special Treatments/Procedures: None  Disposition: U    Medications:  Reconciled Home Medications: Current Discharge Medication List      START taking these medications    Details   amoxicillin-clavulanate 875-125mg (AUGMENTIN) 875-125 mg per tablet Take 1 tablet by mouth every 12 (twelve) hours.  Qty: 8 tablet, Refills: 0      ascorbic acid, vitamin C, (VITAMIN C) 500 MG tablet Take 1 tablet (500 mg total) by mouth 3 (three) times daily.      hydrocodone-acetaminophen 10-325mg  (NORCO)  mg Tab Take 1 tablet by mouth every 6 (six) hours as needed.  Refills: 0      thiamine 100 MG tablet Take 1 tablet (100 mg total) by mouth once daily.         CONTINUE these medications which have NOT CHANGED    Details   buPROPion (WELLBUTRIN XL) 300 MG 24 hr tablet Take 300 mg by mouth once daily.      sertraline (ZOLOFT) 100 MG tablet Take 100 mg by mouth once daily.      traZODone (DESYREL) 50 MG tablet Take 50 mg by mouth every evening.             Discharge Procedure Orders  Diet Adult Regular     Activity as tolerated   Order Comments: Observe fall precautions.     Call MD for:   Order Comments: For worsening symptoms, chest pain, shortness of breath, increased abdominal pain, high grade fever, stroke or stroke like symptoms, immediately go to the nearest Emergency Room or call 911 as soon as possible.       Follow-up Information     PCP.    Why:  Upon discharge

## 2018-04-03 NOTE — PLAN OF CARE
Problem: Patient Care Overview  Goal: Plan of Care Review  Pt VSS. Pt calm and cooperative. Pt complained of side pain 6/10 at the beginning of the shift. Dilaudid given x 1. Pt states pain is 3/10 since. Pt drinking well. Pt voiding well. Dilaudid changed to Lortab. Vitals changed to q shift. Clonidine D/C.  visited with pt at approximately 1145.

## 2018-04-03 NOTE — PLAN OF CARE
04/03/18 0916   Discharge Assessment   Assessment Type Discharge Planning Reassessment   CM placed call to Ajith @ [facility] [phone] to check on status of referral but had to leave message with Hawk. CM will followup.     0970 CM resent referral via [facility] to the following facilities for review and possible acceptance:  * [facility]  * [facility]  * [facility]  * [facility]  * [facility]  * [facility]  * [facility]  * [facility]  [facility]  * [facility]  * [facility]  * [facility]  * [facility]  * [facility]  * [facility]  * [facility]

## 2018-04-03 NOTE — PHYSICIAN QUERY
PT Name: Christiano Gomez Jr.  MR #: [id number]     Physician Query Form - Etiology of Condition Clarification      Paula Landin RN CDS    Contact Information: [phone]    This form is a permanent document in the medical record.     Query Date: April 3, 2018    By submitting this query, we are merely seeking further clarification of documentation.  Please utilize your independent clinical judgment when addressing the question(s) below.     The medical record contains the following:    Findings Supporting Clinical Information Location in Medical Record   Altered Mental status Altered mental status   Polysubstance abuse- unknown substance, THC abuse   Continue mechanical ventilation. Consult pulmonary medicine for ventilator management.     Metabolic acidosis- related to sepsis Vs seizure activity   Severe sepsis    Continue IVF hydration with sodium bicarbonate supplementation.   Follow stat labs. Trend Lactic acid.     . The patient has a history of smoking marijuana and came out of a shed yesterday with mental status altered. Patient reported to fall on the ground jerking. Patient was not responsive  Patient has substance abuse issues.      No acute intracranial abnormality is seen. Motion limited examination.     This is an abnormal EEG during wakefulness, drowsiness and sleep.   Right posterior temporal intermittent focal slowing was noted.    H&P 03/27                    Pulmonology Consults 3/28          MRI Brain 3/29      EEG Procedure 3/28         Please document your best medical opinion regarding the etiology of __Altered Mental Status_______ for which the primary focus of treatment is/was directed.         _____________Polysubstance abuse____________            [    ]  Clinically Undetermined    Please document in your progress notes daily for the duration of treatment, until resolved, and include in your discharge summary.

## 2018-04-03 NOTE — PLAN OF CARE
04/03/18 1315   Discharge Assessment   Assessment Type Discharge Planning Reassessment   MARC received call from Lissett in ICU, patient is ready , report called. [facility] transportation set up, trip # [id number]. CM will bring trip sheet to ICU. Dr. Santos aware and will write dc orders.

## 2018-04-03 NOTE — PLAN OF CARE
04/03/18 1438   Discharge Assessment   Assessment Type Discharge Planning Reassessment   CM faxed dc order and avs to [facility] @ [phone].

## 2018-04-03 NOTE — PLAN OF CARE
Problem: Patient Care Overview  Goal: Plan of Care Review  Outcome: Ongoing (interventions implemented as appropriate)  Pt behavior appropriate at this time, pt received dilaudid for pain 6/10 to ribs, sitter remains at bedside, PEC expires today, waiting on placement

## 2018-04-03 NOTE — PLAN OF CARE
04/03/18 1251   Discharge Assessment   Assessment Type Discharge Planning Reassessment   CEC obtained and faxed to [facility] .Per Arc @ [facility] patient is accepted by Dr. Ventura 4th floor, report can be called to [phone] Ext [phone], charge nurse is Estefani. CM informed ICU to notify CM @ [phone] when patient is ready and [facility] transportation will be called. CM will call and notify Father, Christiano Sr. of acceptance into inpatient Psych. Father is familiar with facility and stated that his son has been there in the past.

## 2018-04-03 NOTE — PLAN OF CARE
04/03/18 1200   Discharge Assessment   Assessment Type Discharge Planning Reassessment   MARC received note from Candi via [employer] stating that since patient has not been out of facility for 24 hours, report can be called to [facility] nursing supervisor @ [phone] as long as it is done before 3 pm. CM notified Edgar, ICU nurse.

## 2018-04-03 NOTE — PLAN OF CARE
04/03/18 1443   Final Note   Assessment Type Final Discharge Note   Discharge Disposition [facility] [place]  ([facility])

## 2018-04-03 NOTE — PLAN OF CARE
04/03/18 1200   Discharge Assessment   Assessment Type Discharge Planning Reassessment   CM placed call to [facility] @ [phone] and informed admissions that patient has PEC and will have CEC today as well. They said that they did not have referral. [facility] CM also resent referral to [facility] and called admissions, spoke to Hammad @ [phone] to inform of referral. He will check and call CM back.CM manually faxed referral to [facility] and called admissions @ [phone] to inform of referral. CM spoke to [facility] @ [facility] @ [phone], he will review packet and call back with decision.

## 2019-04-20 ENCOUNTER — OFFICE VISIT (OUTPATIENT)
Dept: URGENT CARE | Facility: CLINIC | Age: 44
End: 2019-04-20
Payer: MEDICAID

## 2019-04-20 VITALS
TEMPERATURE: 97 F | HEART RATE: 96 BPM | DIASTOLIC BLOOD PRESSURE: 84 MMHG | WEIGHT: 184 LBS | OXYGEN SATURATION: 95 % | RESPIRATION RATE: 16 BRPM | BODY MASS INDEX: 27.17 KG/M2 | SYSTOLIC BLOOD PRESSURE: 127 MMHG

## 2019-04-20 DIAGNOSIS — J01.90 ACUTE NON-RECURRENT SINUSITIS, UNSPECIFIED LOCATION: ICD-10-CM

## 2019-04-20 DIAGNOSIS — J40 BRONCHITIS: Primary | ICD-10-CM

## 2019-04-20 PROCEDURE — 99214 OFFICE O/P EST MOD 30 MIN: CPT | Mod: S$GLB,,, | Performed by: NURSE PRACTITIONER

## 2019-04-20 PROCEDURE — 99214 PR OFFICE/OUTPT VISIT, EST, LEVL IV, 30-39 MIN: ICD-10-PCS | Mod: S$GLB,,, | Performed by: NURSE PRACTITIONER

## 2019-04-20 RX ORDER — FLUTICASONE PROPIONATE 50 MCG
2 SPRAY, SUSPENSION (ML) NASAL DAILY
Qty: 15.8 ML | Refills: 0 | Status: SHIPPED | OUTPATIENT
Start: 2019-04-20 | End: 2023-09-26

## 2019-04-20 RX ORDER — CETIRIZINE HYDROCHLORIDE 10 MG/1
10 TABLET ORAL DAILY
Qty: 30 TABLET | Refills: 0 | Status: SHIPPED | OUTPATIENT
Start: 2019-04-20 | End: 2023-10-03

## 2019-04-20 RX ORDER — AMOXICILLIN 875 MG/1
875 TABLET, FILM COATED ORAL 2 TIMES DAILY
Qty: 14 TABLET | Refills: 0 | Status: SHIPPED | OUTPATIENT
Start: 2019-04-20 | End: 2019-04-27

## 2019-04-20 RX ORDER — ALBUTEROL SULFATE 90 UG/1
2 AEROSOL, METERED RESPIRATORY (INHALATION) EVERY 6 HOURS PRN
Qty: 18 G | Refills: 0 | Status: SHIPPED | OUTPATIENT
Start: 2019-04-20 | End: 2020-04-19

## 2019-04-20 RX ORDER — ALBUTEROL SULFATE 0.83 MG/ML
2.5 SOLUTION RESPIRATORY (INHALATION) EVERY 6 HOURS PRN
Qty: 1 BOX | Refills: 0 | Status: SHIPPED | OUTPATIENT
Start: 2019-04-20 | End: 2020-04-19

## 2019-04-20 NOTE — PROGRESS NOTES
Subjective: post nasal drip, cough, congestion, runny nose       Patient ID: Christiano Gomez Jr. is a 44 y.o. male.    Vitals:  weight is 83.5 kg (184 lb). His temperature is 97.1 °F (36.2 °C). His blood pressure is 127/84 and his pulse is 96. His respiration is 16 and oxygen saturation is 95%.     Chief Complaint: Sinus Problem (post nasal drip, runny nose, cough, congestion)    Patient complains of cough, post nasal drip and runny nose for 2 days. +chest congestion.  Denies fever, sob, chest pain, nausea, vomiting, diarrhea.     Sinus Problem   This is a new problem. The current episode started yesterday. Associated symptoms include congestion, coughing and sinus pressure. Pertinent negatives include no chills, headaches, shortness of breath or sore throat.       Constitution: Negative for chills, fatigue and fever.   HENT: Positive for congestion, postnasal drip and sinus pressure. Negative for sinus pain and sore throat.    Neck: Negative for painful lymph nodes.   Cardiovascular: Negative for chest pain and leg swelling.   Eyes: Negative for double vision and blurred vision.   Respiratory: Positive for cough. Negative for shortness of breath.    Gastrointestinal: Negative for abdominal pain, nausea, vomiting and diarrhea.   Endocrine: negative.   Genitourinary: Negative for dysuria, frequency and urgency.   Musculoskeletal: Negative for joint pain, joint swelling, muscle cramps and muscle ache.   Skin: Negative for color change, pale and rash.   Allergic/Immunologic: Negative for seasonal allergies.   Neurological: Negative for dizziness, history of vertigo, light-headedness, passing out and headaches.   Hematologic/Lymphatic: Negative for swollen lymph nodes, easy bruising/bleeding and history of blood clots. Does not bruise/bleed easily.   Psychiatric/Behavioral: Negative for nervous/anxious, sleep disturbance and depression. The patient is not nervous/anxious.        Objective:      Physical Exam    Constitutional: He is oriented to person, place, and time. He appears well-developed and well-nourished.  Non-toxic appearance. He does not have a sickly appearance. He does not appear ill.   HENT:   Head: Normocephalic.   Right Ear: Tympanic membrane, external ear and ear canal normal.   Left Ear: Tympanic membrane, external ear and ear canal normal.   Nose: Mucosal edema and rhinorrhea present.   Mouth/Throat: Uvula is midline. Posterior oropharyngeal erythema present.   Eyes: Pupils are equal, round, and reactive to light. Conjunctivae and EOM are normal.   Neck: Normal range of motion and full passive range of motion without pain. Neck supple.   Cardiovascular: Normal rate, regular rhythm and normal heart sounds.   Pulmonary/Chest: Effort normal. He has wheezes in the right upper field, the right lower field, the left upper field and the left lower field.   mild expiratory wheezing   Abdominal: Soft. Normal appearance and bowel sounds are normal. There is no tenderness.   Musculoskeletal: Normal range of motion.   Lymphadenopathy:     He has no cervical adenopathy.   Neurological: He is alert and oriented to person, place, and time. GCS eye subscore is 4. GCS verbal subscore is 5. GCS motor subscore is 6.   Skin: Skin is warm, dry and intact. No rash noted.   Psychiatric: He has a normal mood and affect.   Vitals reviewed.      Assessment:       1. Bronchitis    2. Acute non-recurrent sinusitis, unspecified location        Plan:       Lungs clear throughout, sats 96% , and no respiratory distress.  I do not suspect pneumonia, pulmonary embolism or any other more serious condition requiring further treatment. Advised pt to return to clinic or go to ER for any worsening of symptoms. Based on my clinical evaluation, I do not appreciate any immediate, emergent, or life threatening condition or etiology that warrants additional workup today and feel that the patient can be discharged with close follow up care.      Due to patient presentation as well as length of symptoms, will treat for bacterial sinusitis. Patient is being discharged home. Discussed reasons to return and importance of followup. All questions addressed and patient given discharge instructions and followup information.    Bronchitis    Acute non-recurrent sinusitis, unspecified location    Other orders  -     albuterol (PROVENTIL HFA) 90 mcg/actuation inhaler; Inhale 2 puffs into the lungs every 6 (six) hours as needed for Wheezing. Rescue  Dispense: 18 g; Refill: 0  -     dexchlorphen-phenylephrine-DM (POLYTUSSIN DM) 1-5-10 mg/5 mL Syrp; Take 5 mLs by mouth every 4 (four) hours as needed.  Dispense: 120 mL; Refill: 0  -     cetirizine (ZYRTEC) 10 MG tablet; Take 1 tablet (10 mg total) by mouth once daily.  Dispense: 30 tablet; Refill: 0  -     fluticasone (FLONASE) 50 mcg/actuation nasal spray; 2 sprays (100 mcg total) by Each Nare route once daily.  Dispense: 15.8 mL; Refill: 0  -     amoxicillin (AMOXIL) 875 MG tablet; Take 1 tablet (875 mg total) by mouth 2 (two) times daily. for 7 days  Dispense: 14 tablet; Refill: 0  -     albuterol (PROVENTIL) 2.5 mg /3 mL (0.083 %) nebulizer solution; Take 3 mLs (2.5 mg total) by nebulization every 6 (six) hours as needed for Wheezing. Rescue  Dispense: 1 Box; Refill: 0

## 2019-04-20 NOTE — PATIENT INSTRUCTIONS
What Is Acute Bronchitis?  Acute bronchitis is when the airways in your lungs (bronchial tubes) become red and swollen (inflamed). It is usually caused by a viral infection. But it can also occur because of a bacteria or allergen. Symptoms include a cough that produces yellow or greenish mucus and can last for days or sometimes weeks.  Inside healthy lungs    Air travels in and out of the lungs through the airways. The linings of these airways produce sticky mucus. This mucus traps particles that enter the lungs. Tiny structures called cilia then sweep the particles out of the airways.     Healthy airway: Airways are normally open. Air moves in and out easily.      Healthy cilia: Tiny, hairlike cilia sweep mucus and particles up and out of the airways.   Lungs with bronchitis  Bronchitis often occurs with a cold or the flu virus. The airways become inflamed (red and swollen). There is a deep hacking cough from the extra mucus. Other symptoms may include:  · Wheezing  · Chest discomfort  · Shortness of breath  · Mild fever  A second infection, this time due to bacteria, may then occur. And airways irritated by allergens or smoke are more likely to get infected.        Inflamed airway: Inflammation and extra mucus narrow the airway, causing shortness of breath.      Impaired cilia: Extra mucus impairs cilia, causing congestion and wheezing. Smoking makes the problem worse.   Making a diagnosis  A physical exam, health history, and certain tests help your healthcare provider make the diagnosis.  Health history  Your healthcare provider will ask you about your symptoms.  The exam  Your provider listens to your chest for signs of congestion. He or she may also check your ears, nose, and throat.  Possible tests  · A sputum test for bacteria. This requires a sample of mucus from your lungs.  · A nasal or throat swab. This tests to see if you have a bacterial infection.  · A chest X-ray. This is done if your healthcare  provider thinks you have pneumonia.  · Tests to check for an underlying condition. Other tests may be done to check for things such as allergies, asthma, or COPD (chronic obstructive pulmonary disease). You may need to see a specialist for more lung function testing.  Treating a cough  The main treatment for bronchitis is easing symptoms. Avoiding smoke, allergens, and other things that trigger coughing can often help. If the infection is bacterial, you may be given antibiotics. During the illness, it's important to get plenty of sleep. To ease symptoms:  · Dont smoke. Also avoid secondhand smoke.  · Use a humidifier. Or try breathing in steam from a hot shower. This may help loosen mucus.  · Drink a lot of water and juice. They can soothe the throat and may help thin mucus.  · Sit up or use extra pillows when in bed. This helps to lessen coughing and congestion.  · Ask your provider about using medicine. Ask about using cough medicine, pain and fever medicine, or a decongestant.  Antibiotics  Most cases of bronchitis are caused by cold or flu viruses. They dont need antibiotics to treat them, even if your mucus is thick and green or yellow. Antibiotics dont treat viral illness and antibiotics have not been shown to have any benefit in cases of acute bronchitis. Taking antibiotics when they are not needed increases your risk of getting an infection later that is antibiotic-resistant. Antibiotics can also cause severe cases of diarrhea that require other antibiotics to treat.  It is important that you accept your healthcare provider's opinion to not use antibiotics. Your provider will prescribe antibiotics if the infection is caused by bacteria. If they are prescribed:  · Take all of the medicine. Take the medicine until it is used up, even if symptoms have improved. If you dont, the bronchitis may come back.  · Take the medicines as directed. For instance, some medicines should be taken with food.  · Ask about  side effects. Ask your provider or pharmacist what side effects are common, and what to do about them.  Follow-up care  You should see your provider again in 2 to 3 weeks. By this time, symptoms should have improved. An infection that lasts longer may mean you have a more serious problem.  Prevention  · Avoid tobacco smoke. If you smoke, quit. Stay away from smoky places. Ask friends and family not to smoke around you, or in your home or car.  · Get checked for allergies.  · Ask your provider about getting a yearly flu shot. Also ask about pneumococcal or pneumonia shots.  · Wash your hands often. This helps reduce the chance of picking up viruses that cause colds and flu.  Call your healthcare provider if:  · Symptoms worsen, or you have new symptoms  · Breathing problems worsen or  become severe  · Symptoms dont get better within a week, or within 3 days of taking antibiotics   Date Last Reviewed: 2/1/2017  © 4221-3581 [employer]. [address] [place] [address]. All rights reserved. This information is not intended as a substitute for professional medical care. Always follow your healthcare professional's instructions.        Bronchitis with Wheezing (Viral or Bacterial: Adult)    Bronchitis is an infection of the air passages. It often occurs during a cold and is usually caused by a virus. Symptoms include cough with mucus (phlegm) and low-grade fever. This illness is contagious during the first few days and is spread through the air by coughing and sneezing, or by direct contact (touching the sick person and then touching your own eyes, nose, or mouth).  If there is a lot of inflammation, air flow is restricted. The air passages may also go into spasm, especially if you have asthma. This causes wheezing and difficulty breathing even in people who do not have asthma.  Bronchitis usually lasts 7 to 14 days. The wheezing should improve with treatment during the first week. An inhaler is  often prescribed to relax the air passages and stop wheezing. Antibiotics will be prescribed if your doctor thinks there is also a secondary bacterial infection.  Home care  · If symptoms are severe, rest at home for the first 2 to 3 days. When you go back to your usual activities, don't let yourself get too tired.  · Do not smoke. Also avoid being exposed to secondhand smoke.  · You may use over-the-counter medicine to control fever or pain, unless another medicine was prescribed. Note: If you have chronic liver or kidney disease or have ever had a stomach ulcer or gastrointestinal bleeding, talk with your healthcare provider before using these medicines. Also talk to your provider if you are taking medicine to prevent blood clots.) Aspirin should never be given to anyone younger than 18 years of age who is ill with a viral infection or fever. It may cause severe liver or brain damage.  · Your appetite may be poor, so a light diet is fine. Avoid dehydration by drinking 6 to 8 glasses of fluids per day (such as water, soft drinks, sports drinks, juices, tea, or soup). Extra fluids will help loosen secretions in the nose and lungs.  · Over-the-counter cough, cold, and sore-throat medicines will not shorten the length of the illness, but they may be helpful to reduce symptoms. (Note: Do not use decongestants if you have high blood pressure.)  · If you were given an inhaler, use it exactly as directed. If you need to use it more often than prescribed, your condition may be worsening. If this happens, contact your healthcare provider.  · If prescribed, finish all antibiotic medicine, even if you are feeling better after only a few days.  Follow-up care  Follow up with your healthcare provider, or as advised. If you had an X-ray or ECG (electrocardiogram), a specialist will review it. You will be notified of any new findings that may affect your care.  Note: If you are age 65 or older, or if you have a chronic lung  disease or condition that affects your immune system, or you smoke, talk to your healthcare provider about having a pneumococcal vaccinations and a yearly influenza vaccination (flu shot).  When to seek medical advice  Call your healthcare provider right away if any of these occur:  · Fever of 100.4°F (38°C) or higher  · Coughing up increasing amounts of colored sputum  · Weakness, drowsiness, headache, facial pain, ear pain, or a stiff neck  Call 911, or get immediate medical care  Contact emergency services right away if any of these occur.  · Coughing up blood  · Worsening weakness, drowsiness, headache, or stiff neck  · Increased wheezing not helped with medication, shortness of breath, or pain with breathing  Date Last Reviewed: 9/13/2015  © 5873-8774 [employer], [employer]. [address] [place] [address], [place] [address]. All rights reserved. This information is not intended as a substitute for professional medical care. Always follow your healthcare professional's instructions.

## 2022-12-14 ENCOUNTER — HOSPITAL ENCOUNTER (EMERGENCY)
Facility: HOSPITAL | Age: 47
Discharge: PSYCHIATRIC OR SUBSTANCE USE FACILITY | End: 2022-12-15
Attending: EMERGENCY MEDICINE
Payer: MEDICAID

## 2022-12-14 DIAGNOSIS — R46.89 AGGRESSIVE BEHAVIOR: ICD-10-CM

## 2022-12-14 DIAGNOSIS — Z00.8 MEDICAL CLEARANCE FOR PSYCHIATRIC ADMISSION: Primary | ICD-10-CM

## 2022-12-14 LAB
ALBUMIN SERPL BCP-MCNC: 4.1 G/DL (ref 3.5–5.2)
ALP SERPL-CCNC: 74 U/L (ref 55–135)
ALT SERPL W/O P-5'-P-CCNC: 39 U/L (ref 10–44)
AMPHET+METHAMPHET UR QL: NEGATIVE
ANION GAP SERPL CALC-SCNC: 10 MMOL/L (ref 8–16)
APAP SERPL-MCNC: <10 UG/ML (ref 10–20)
AST SERPL-CCNC: 50 U/L (ref 10–40)
BARBITURATES UR QL SCN>200 NG/ML: NEGATIVE
BASOPHILS # BLD AUTO: 0.04 K/UL (ref 0–0.2)
BASOPHILS NFR BLD: 0.4 % (ref 0–1.9)
BENZODIAZ UR QL SCN>200 NG/ML: ABNORMAL
BILIRUB SERPL-MCNC: 1.4 MG/DL (ref 0.1–1)
BILIRUB UR QL STRIP: NEGATIVE
BUN SERPL-MCNC: 8 MG/DL (ref 6–20)
BZE UR QL SCN: NEGATIVE
CALCIUM SERPL-MCNC: 9.2 MG/DL (ref 8.7–10.5)
CANNABINOIDS UR QL SCN: NEGATIVE
CHLORIDE SERPL-SCNC: 103 MMOL/L (ref 95–110)
CLARITY UR: CLEAR
CO2 SERPL-SCNC: 24 MMOL/L (ref 23–29)
COLOR UR: YELLOW
CREAT SERPL-MCNC: 0.8 MG/DL (ref 0.5–1.4)
CREAT UR-MCNC: 89 MG/DL (ref 23–375)
DIFFERENTIAL METHOD: ABNORMAL
EOSINOPHIL # BLD AUTO: 0.1 K/UL (ref 0–0.5)
EOSINOPHIL NFR BLD: 0.7 % (ref 0–8)
ERYTHROCYTE [DISTWIDTH] IN BLOOD BY AUTOMATED COUNT: 12.5 % (ref 11.5–14.5)
EST. GFR  (NO RACE VARIABLE): >60 ML/MIN/1.73 M^2
ETHANOL SERPL-MCNC: <5 MG/DL
GLUCOSE SERPL-MCNC: 107 MG/DL (ref 70–110)
GLUCOSE UR QL STRIP: NEGATIVE
HCT VFR BLD AUTO: 44.4 % (ref 40–54)
HGB BLD-MCNC: 15.5 G/DL (ref 14–18)
HGB UR QL STRIP: NEGATIVE
IMM GRANULOCYTES # BLD AUTO: 0.05 K/UL (ref 0–0.04)
IMM GRANULOCYTES NFR BLD AUTO: 0.5 % (ref 0–0.5)
KETONES UR QL STRIP: NEGATIVE
LEUKOCYTE ESTERASE UR QL STRIP: NEGATIVE
LYMPHOCYTES # BLD AUTO: 1.2 K/UL (ref 1–4.8)
LYMPHOCYTES NFR BLD: 12.8 % (ref 18–48)
MCH RBC QN AUTO: 30.5 PG (ref 27–31)
MCHC RBC AUTO-ENTMCNC: 34.9 G/DL (ref 32–36)
MCV RBC AUTO: 87 FL (ref 82–98)
MONOCYTES # BLD AUTO: 0.6 K/UL (ref 0.3–1)
MONOCYTES NFR BLD: 6.9 % (ref 4–15)
NEUTROPHILS # BLD AUTO: 7.2 K/UL (ref 1.8–7.7)
NEUTROPHILS NFR BLD: 78.7 % (ref 38–73)
NITRITE UR QL STRIP: NEGATIVE
NRBC BLD-RTO: 0 /100 WBC
OPIATES UR QL SCN: NEGATIVE
PCP UR QL SCN>25 NG/ML: NEGATIVE
PH UR STRIP: 7 [PH] (ref 5–8)
PLATELET # BLD AUTO: 114 K/UL (ref 150–450)
PMV BLD AUTO: 11 FL (ref 9.2–12.9)
POTASSIUM SERPL-SCNC: 3.4 MMOL/L (ref 3.5–5.1)
PROT SERPL-MCNC: 8.3 G/DL (ref 6–8.4)
PROT UR QL STRIP: NEGATIVE
RBC # BLD AUTO: 5.09 M/UL (ref 4.6–6.2)
SALICYLATES SERPL-MCNC: <4 MG/DL (ref 15–30)
SARS-COV-2 RDRP RESP QL NAA+PROBE: NEGATIVE
SODIUM SERPL-SCNC: 137 MMOL/L (ref 136–145)
SP GR UR STRIP: 1.01 (ref 1–1.03)
TOXICOLOGY INFORMATION: ABNORMAL
TSH SERPL DL<=0.005 MIU/L-ACNC: 0.4 UIU/ML (ref 0.34–5.6)
URN SPEC COLLECT METH UR: NORMAL
UROBILINOGEN UR STRIP-ACNC: NEGATIVE EU/DL
WBC # BLD AUTO: 9.11 K/UL (ref 3.9–12.7)

## 2022-12-14 PROCEDURE — 80307 DRUG TEST PRSMV CHEM ANLYZR: CPT | Performed by: EMERGENCY MEDICINE

## 2022-12-14 PROCEDURE — 80143 DRUG ASSAY ACETAMINOPHEN: CPT | Performed by: EMERGENCY MEDICINE

## 2022-12-14 PROCEDURE — 84443 ASSAY THYROID STIM HORMONE: CPT | Performed by: EMERGENCY MEDICINE

## 2022-12-14 PROCEDURE — 80179 DRUG ASSAY SALICYLATE: CPT | Performed by: EMERGENCY MEDICINE

## 2022-12-14 PROCEDURE — 85025 COMPLETE CBC W/AUTO DIFF WBC: CPT | Performed by: EMERGENCY MEDICINE

## 2022-12-14 PROCEDURE — 81003 URINALYSIS AUTO W/O SCOPE: CPT | Mod: 59 | Performed by: EMERGENCY MEDICINE

## 2022-12-14 PROCEDURE — U0002 COVID-19 LAB TEST NON-CDC: HCPCS | Performed by: EMERGENCY MEDICINE

## 2022-12-14 PROCEDURE — 99285 EMERGENCY DEPT VISIT HI MDM: CPT

## 2022-12-14 PROCEDURE — 80053 COMPREHEN METABOLIC PANEL: CPT | Performed by: EMERGENCY MEDICINE

## 2022-12-14 PROCEDURE — 82077 ASSAY SPEC XCP UR&BREATH IA: CPT | Performed by: EMERGENCY MEDICINE

## 2022-12-15 VITALS
OXYGEN SATURATION: 98 % | SYSTOLIC BLOOD PRESSURE: 122 MMHG | DIASTOLIC BLOOD PRESSURE: 75 MMHG | RESPIRATION RATE: 16 BRPM | WEIGHT: 170 LBS | HEART RATE: 97 BPM | HEIGHT: 69 IN | BODY MASS INDEX: 25.18 KG/M2 | TEMPERATURE: 98 F

## 2022-12-15 PROCEDURE — 25000003 PHARM REV CODE 250: Performed by: EMERGENCY MEDICINE

## 2022-12-15 PROCEDURE — G0425 PR INPT TELEHEALTH CONSULT 30M: ICD-10-PCS | Mod: 95,,, | Performed by: PSYCHIATRY & NEUROLOGY

## 2022-12-15 PROCEDURE — G0425 INPT/ED TELECONSULT30: HCPCS | Mod: 95,,, | Performed by: PSYCHIATRY & NEUROLOGY

## 2022-12-15 RX ORDER — QUETIAPINE FUMARATE 25 MG/1
50 TABLET, FILM COATED ORAL ONCE
Status: COMPLETED | OUTPATIENT
Start: 2022-12-15 | End: 2022-12-15

## 2022-12-15 RX ADMIN — QUETIAPINE 50 MG: 25 TABLET ORAL at 02:12
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
## 2022-12-15 NOTE — ED NOTES
Pt denies SI/HI but states he has been very argumentative with his parents at home. Very unkept. Sitter in view. Water provided.

## 2022-12-15 NOTE — ED NOTES
Patient in bed, no acute distress noted, awake, alert, and oriented x 4, flat affect, respirations even and unlabored, and skin is warm and dry. Patient verbalized understanding of PEC status and plan of care at this time. Side rails up x 2,  bed low and locked. Sitter in direct view of patient and documenting behavior every 15 minutes. Safety protocol in place and room cleared per protocol. Patient wearing purple  scrubs.

## 2022-12-15 NOTE — ED NOTES
Rounding on the patient has been done. he has been updated on the plan of care and his current status. Comfort positioning and restroom needs were addressed. he was advised when a reassessment would take place. The patient is resting comfortably on the stretcher, respirations are even and unlabored, skin warm and dry. sitter outside room maintaining visual contact with patient.

## 2023-01-31 ENCOUNTER — OFFICE VISIT (OUTPATIENT)
Dept: URGENT CARE | Facility: CLINIC | Age: 48
End: 2023-01-31
Payer: MEDICAID

## 2023-01-31 VITALS
BODY MASS INDEX: 26.22 KG/M2 | TEMPERATURE: 98 F | HEART RATE: 77 BPM | DIASTOLIC BLOOD PRESSURE: 79 MMHG | WEIGHT: 173 LBS | HEIGHT: 68 IN | SYSTOLIC BLOOD PRESSURE: 116 MMHG | RESPIRATION RATE: 20 BRPM | OXYGEN SATURATION: 96 %

## 2023-01-31 DIAGNOSIS — R09.81 NASAL CONGESTION: ICD-10-CM

## 2023-01-31 DIAGNOSIS — J06.9 VIRAL URI: ICD-10-CM

## 2023-01-31 DIAGNOSIS — R05.9 COUGH, UNSPECIFIED TYPE: Primary | ICD-10-CM

## 2023-01-31 DIAGNOSIS — R06.2 WHEEZING: ICD-10-CM

## 2023-01-31 LAB
CTP QC/QA: YES
CTP QC/QA: YES
POC MOLECULAR INFLUENZA A AGN: NEGATIVE
POC MOLECULAR INFLUENZA B AGN: NEGATIVE
SARS-COV-2 AG RESP QL IA.RAPID: NEGATIVE

## 2023-01-31 PROCEDURE — 1160F RVW MEDS BY RX/DR IN RCRD: CPT | Mod: CPTII,S$GLB,,

## 2023-01-31 PROCEDURE — 87811 SARS CORONAVIRUS 2 ANTIGEN POCT, MANUAL READ: ICD-10-PCS | Mod: QW,S$GLB,,

## 2023-01-31 PROCEDURE — 87804 INFLUENZA ASSAY W/OPTIC: CPT | Mod: QW,59,S$GLB,

## 2023-01-31 PROCEDURE — 3074F PR MOST RECENT SYSTOLIC BLOOD PRESSURE < 130 MM HG: ICD-10-PCS | Mod: CPTII,S$GLB,,

## 2023-01-31 PROCEDURE — 3008F BODY MASS INDEX DOCD: CPT | Mod: CPTII,S$GLB,,

## 2023-01-31 PROCEDURE — 3078F PR MOST RECENT DIASTOLIC BLOOD PRESSURE < 80 MM HG: ICD-10-PCS | Mod: CPTII,S$GLB,,

## 2023-01-31 PROCEDURE — 1160F PR REVIEW ALL MEDS BY PRESCRIBER/CLIN PHARMACIST DOCUMENTED: ICD-10-PCS | Mod: CPTII,S$GLB,,

## 2023-01-31 PROCEDURE — 3078F DIAST BP <80 MM HG: CPT | Mod: CPTII,S$GLB,,

## 2023-01-31 PROCEDURE — 87811 SARS-COV-2 COVID19 W/OPTIC: CPT | Mod: QW,S$GLB,,

## 2023-01-31 PROCEDURE — 99214 PR OFFICE/OUTPT VISIT, EST, LEVL IV, 30-39 MIN: ICD-10-PCS | Mod: S$GLB,,,

## 2023-01-31 PROCEDURE — 1159F PR MEDICATION LIST DOCUMENTED IN MEDICAL RECORD: ICD-10-PCS | Mod: CPTII,S$GLB,,

## 2023-01-31 PROCEDURE — 87804 PR  DETECT AGENT,IMMUN,DIR OBS,INFLUENZA: ICD-10-PCS | Mod: QW,59,S$GLB,

## 2023-01-31 PROCEDURE — 3008F PR BODY MASS INDEX (BMI) DOCUMENTED: ICD-10-PCS | Mod: CPTII,S$GLB,,

## 2023-01-31 PROCEDURE — 1159F MED LIST DOCD IN RCRD: CPT | Mod: CPTII,S$GLB,,

## 2023-01-31 PROCEDURE — 3074F SYST BP LT 130 MM HG: CPT | Mod: CPTII,S$GLB,,

## 2023-01-31 PROCEDURE — 99214 OFFICE O/P EST MOD 30 MIN: CPT | Mod: S$GLB,,,

## 2023-01-31 RX ORDER — INHALER, ASSIST DEVICES
SPACER (EA) MISCELLANEOUS
Qty: 1 EACH | Refills: 0 | Status: SHIPPED | OUTPATIENT
Start: 2023-01-31 | End: 2023-09-26

## 2023-01-31 RX ORDER — BENZONATATE 200 MG/1
200 CAPSULE ORAL 3 TIMES DAILY PRN
Qty: 15 CAPSULE | Refills: 0 | Status: SHIPPED | OUTPATIENT
Start: 2023-01-31 | End: 2023-02-05

## 2023-01-31 RX ORDER — AZELASTINE 1 MG/ML
1 SPRAY, METERED NASAL 2 TIMES DAILY
Qty: 30 ML | Refills: 0 | Status: SHIPPED | OUTPATIENT
Start: 2023-01-31 | End: 2023-10-03

## 2023-01-31 RX ORDER — ALBUTEROL SULFATE 90 UG/1
2 AEROSOL, METERED RESPIRATORY (INHALATION) EVERY 6 HOURS PRN
Qty: 18 G | Refills: 0 | Status: SHIPPED | OUTPATIENT
Start: 2023-01-31 | End: 2023-10-03

## 2023-01-31 RX ORDER — IPRATROPIUM BROMIDE AND ALBUTEROL SULFATE 2.5; .5 MG/3ML; MG/3ML
3 SOLUTION RESPIRATORY (INHALATION) EVERY 6 HOURS PRN
Qty: 75 ML | Refills: 0 | Status: SHIPPED | OUTPATIENT
Start: 2023-01-31 | End: 2024-01-31

## 2023-01-31 NOTE — PATIENT INSTRUCTIONS
Symptomatic treatment to include:    Rest, increase fluid intake to include electrolyte replacement  Ibuprofen/Tylenol as directed for fever, sore throat, body aches  Zrytec and flonase for sinus symptoms  Tessalon perles cough pills as needed day or night  Mucinex D over the counter as directed for sinus congestion.  Coricidin HBP if you have high blood pressure.  Warm, salt water gargles, over the counter throat lozenges or sprays as desires.   ER for difficulty breathing not relieved by rest, excessive lethargy and/or change in mental status, oxygen level less than 93% or worsening of symptoms.   Follow up with your PCP within one week as directed.   Carry rescue inhaler with you at all times, use as needed for wheezing.

## 2023-01-31 NOTE — PROGRESS NOTES
"Subjective:       Patient ID: Christiano Gomez Jr. is a 47 y.o. male.    Vitals:  height is 5' 8" (1.727 m) and weight is 78.5 kg (173 lb). His oral temperature is 97.7 °F (36.5 °C). His blood pressure is 116/79 and his pulse is 77. His respiration is 20 and oxygen saturation is 96%.     Chief Complaint: Cough    Cough  This is a new problem. Episode onset: 3/4 days ago. The cough is Non-productive. Associated symptoms include wheezing. Pertinent negatives include no chest pain, chills, ear pain, fever, postnasal drip, sore throat or shortness of breath. Associated symptoms comments: Chest congestion. The symptoms are aggravated by lying down. He has tried OTC cough suppressant (Ibuprofen) for the symptoms. The treatment provided no relief.     Constitution: Negative for activity change, appetite change, chills, sweating, fever and unexpected weight change.   HENT:  Negative for ear pain, postnasal drip, sinus pain, sinus pressure and sore throat.    Cardiovascular:  Negative for chest pain.   Eyes:  Negative for blurred vision.   Respiratory:  Positive for cough and wheezing. Negative for chest tightness, sputum production, shortness of breath and asthma.    Gastrointestinal:  Negative for abdominal pain.   Allergic/Immunologic: Negative for asthma.   Neurological:  Negative for dizziness, history of vertigo and altered mental status.   Psychiatric/Behavioral:  Negative for altered mental status.      Objective:      Physical Exam   Constitutional:  Non-toxic appearance. He does not appear ill. No distress.   HENT:   Head: Normocephalic.   Eyes: Conjunctivae are normal. Extraocular movement intact   Cardiovascular: Normal rate, normal heart sounds and normal pulses.   Pulmonary/Chest: Effort normal. No respiratory distress. He has wheezes (mild expiratory wheeze noted throughout lung fields). He has no rhonchi. He has no rales.   Neurological: no focal deficit. He is alert.   Skin: Skin is not diaphoretic. Capillary " refill takes 2 to 3 seconds.   Psychiatric: His behavior is normal. Mood normal.       Assessment:       1. Cough, unspecified type    2. Viral URI    3. Wheezing    4. Nasal congestion          Plan:         Cough, unspecified type  -     SARS Coronavirus 2 Antigen, POCT Manual Read  -     POCT Influenza A/B MOLECULAR  -     benzonatate (TESSALON) 200 MG capsule; Take 1 capsule (200 mg total) by mouth 3 (three) times daily as needed for Cough.  Dispense: 15 capsule; Refill: 0    Viral URI    Wheezing  -     albuterol-ipratropium (DUO-NEB) 2.5 mg-0.5 mg/3 mL nebulizer solution; Take 3 mLs by nebulization every 6 (six) hours as needed for Wheezing. Rescue  Dispense: 75 mL; Refill: 0  -     albuterol (PROVENTIL/VENTOLIN HFA) 90 mcg/actuation inhaler; Inhale 2 puffs into the lungs every 6 (six) hours as needed for Wheezing. Rescue  Dispense: 18 g; Refill: 0  -     inhalation spacing device (BREATHERITE MDI SPACER); Use as directed for inhalation.  Dispense: 1 each; Refill: 0    Nasal congestion  -     azelastine (ASTELIN) 137 mcg (0.1 %) nasal spray; 1 spray (137 mcg total) by Nasal route 2 (two) times daily. for 7 days  Dispense: 30 mL; Refill: 0         Pt presents with congestion and cough x 3 days, reports cough is dry, pt has wheezing on exam, denies history of asthma, is a daily smoker, reports history of wheezing in the past when he gets sick, he has been doing his albuterol nebulizer at home with some relief, will add due-nebs, pt has history of bipolar, will avoid oral steroids at this time, pt in no acute distress, denies sob or cp, high suspicion for viral etiology, will continue supportive treatment, pt informed to have strict fu with his pcp, ER precautions given.

## 2023-07-07 ENCOUNTER — HOSPITAL ENCOUNTER (EMERGENCY)
Facility: HOSPITAL | Age: 48
Discharge: LEFT BEFORE DISCHARGE | End: 2023-07-07
Attending: EMERGENCY MEDICINE
Payer: MEDICAID

## 2023-07-07 VITALS
TEMPERATURE: 99 F | RESPIRATION RATE: 16 BRPM | OXYGEN SATURATION: 97 % | HEART RATE: 90 BPM | HEIGHT: 68 IN | BODY MASS INDEX: 27.28 KG/M2 | SYSTOLIC BLOOD PRESSURE: 135 MMHG | WEIGHT: 180 LBS | DIASTOLIC BLOOD PRESSURE: 87 MMHG

## 2023-07-07 DIAGNOSIS — R60.9 SWELLING: ICD-10-CM

## 2023-07-07 DIAGNOSIS — R06.02 SHORTNESS OF BREATH: ICD-10-CM

## 2023-07-07 LAB
ALBUMIN SERPL BCP-MCNC: 3.4 G/DL (ref 3.5–5.2)
ALP SERPL-CCNC: 84 U/L (ref 55–135)
ALT SERPL W/O P-5'-P-CCNC: 27 U/L (ref 10–44)
ANION GAP SERPL CALC-SCNC: 5 MMOL/L (ref 8–16)
AST SERPL-CCNC: 48 U/L (ref 10–40)
BASOPHILS # BLD AUTO: 0.04 K/UL (ref 0–0.2)
BASOPHILS NFR BLD: 0.5 % (ref 0–1.9)
BILIRUB SERPL-MCNC: 1.4 MG/DL (ref 0.1–1)
BNP SERPL-MCNC: 69 PG/ML (ref 0–99)
BUN SERPL-MCNC: <5 MG/DL (ref 6–20)
CALCIUM SERPL-MCNC: 8.3 MG/DL (ref 8.7–10.5)
CHLORIDE SERPL-SCNC: 94 MMOL/L (ref 95–110)
CO2 SERPL-SCNC: 27 MMOL/L (ref 23–29)
CREAT SERPL-MCNC: 0.5 MG/DL (ref 0.5–1.4)
DIFFERENTIAL METHOD: ABNORMAL
EOSINOPHIL # BLD AUTO: 0.7 K/UL (ref 0–0.5)
EOSINOPHIL NFR BLD: 8.5 % (ref 0–8)
ERYTHROCYTE [DISTWIDTH] IN BLOOD BY AUTOMATED COUNT: 12.8 % (ref 11.5–14.5)
EST. GFR  (NO RACE VARIABLE): >60 ML/MIN/1.73 M^2
GLUCOSE SERPL-MCNC: 105 MG/DL (ref 70–110)
HCT VFR BLD AUTO: 37.1 % (ref 40–54)
HGB BLD-MCNC: 13.3 G/DL (ref 14–18)
IMM GRANULOCYTES # BLD AUTO: 0.04 K/UL (ref 0–0.04)
IMM GRANULOCYTES NFR BLD AUTO: 0.5 % (ref 0–0.5)
LYMPHOCYTES # BLD AUTO: 1.1 K/UL (ref 1–4.8)
LYMPHOCYTES NFR BLD: 14.1 % (ref 18–48)
MCH RBC QN AUTO: 31.4 PG (ref 27–31)
MCHC RBC AUTO-ENTMCNC: 35.8 G/DL (ref 32–36)
MCV RBC AUTO: 88 FL (ref 82–98)
MONOCYTES # BLD AUTO: 0.8 K/UL (ref 0.3–1)
MONOCYTES NFR BLD: 9.9 % (ref 4–15)
NEUTROPHILS # BLD AUTO: 5.1 K/UL (ref 1.8–7.7)
NEUTROPHILS NFR BLD: 66.5 % (ref 38–73)
NRBC BLD-RTO: 0 /100 WBC
PLATELET # BLD AUTO: 128 K/UL (ref 150–450)
PMV BLD AUTO: 11 FL (ref 9.2–12.9)
POTASSIUM SERPL-SCNC: 3.4 MMOL/L (ref 3.5–5.1)
PROT SERPL-MCNC: 6.7 G/DL (ref 6–8.4)
RBC # BLD AUTO: 4.24 M/UL (ref 4.6–6.2)
SODIUM SERPL-SCNC: 126 MMOL/L (ref 136–145)
TROPONIN I SERPL HS-MCNC: 3 PG/ML (ref 0–14.9)
WBC # BLD AUTO: 7.65 K/UL (ref 3.9–12.7)

## 2023-07-07 PROCEDURE — 93010 ELECTROCARDIOGRAM REPORT: CPT | Mod: ,,, | Performed by: INTERNAL MEDICINE

## 2023-07-07 PROCEDURE — 84484 ASSAY OF TROPONIN QUANT: CPT | Performed by: EMERGENCY MEDICINE

## 2023-07-07 PROCEDURE — 63600175 PHARM REV CODE 636 W HCPCS: Performed by: STUDENT IN AN ORGANIZED HEALTH CARE EDUCATION/TRAINING PROGRAM

## 2023-07-07 PROCEDURE — 25000003 PHARM REV CODE 250: Performed by: STUDENT IN AN ORGANIZED HEALTH CARE EDUCATION/TRAINING PROGRAM

## 2023-07-07 PROCEDURE — 93010 EKG 12-LEAD: ICD-10-PCS | Mod: ,,, | Performed by: INTERNAL MEDICINE

## 2023-07-07 PROCEDURE — 85025 COMPLETE CBC W/AUTO DIFF WBC: CPT | Performed by: EMERGENCY MEDICINE

## 2023-07-07 PROCEDURE — 87040 BLOOD CULTURE FOR BACTERIA: CPT | Performed by: NURSE PRACTITIONER

## 2023-07-07 PROCEDURE — 96374 THER/PROPH/DIAG INJ IV PUSH: CPT

## 2023-07-07 PROCEDURE — 83880 ASSAY OF NATRIURETIC PEPTIDE: CPT | Performed by: EMERGENCY MEDICINE

## 2023-07-07 PROCEDURE — 99285 EMERGENCY DEPT VISIT HI MDM: CPT | Mod: 25

## 2023-07-07 PROCEDURE — 36415 COLL VENOUS BLD VENIPUNCTURE: CPT | Performed by: EMERGENCY MEDICINE

## 2023-07-07 PROCEDURE — 80053 COMPREHEN METABOLIC PANEL: CPT | Performed by: EMERGENCY MEDICINE

## 2023-07-07 PROCEDURE — 93005 ELECTROCARDIOGRAM TRACING: CPT | Performed by: INTERNAL MEDICINE

## 2023-07-07 PROCEDURE — 87522 HEPATITIS C REVRS TRNSCRPJ: CPT | Performed by: STUDENT IN AN ORGANIZED HEALTH CARE EDUCATION/TRAINING PROGRAM

## 2023-07-07 PROCEDURE — 80074 ACUTE HEPATITIS PANEL: CPT | Performed by: STUDENT IN AN ORGANIZED HEALTH CARE EDUCATION/TRAINING PROGRAM

## 2023-07-07 PROCEDURE — 36415 COLL VENOUS BLD VENIPUNCTURE: CPT | Performed by: NURSE PRACTITIONER

## 2023-07-07 RX ORDER — FUROSEMIDE 10 MG/ML
40 INJECTION INTRAMUSCULAR; INTRAVENOUS
Status: COMPLETED | OUTPATIENT
Start: 2023-07-07 | End: 2023-07-07

## 2023-07-07 RX ORDER — TRIAMCINOLONE ACETONIDE 1 MG/G
CREAM TOPICAL
COMMUNITY
Start: 2023-06-07 | End: 2023-10-03

## 2023-07-07 RX ORDER — CLONAZEPAM 1 MG/1
1 TABLET ORAL 2 TIMES DAILY PRN
COMMUNITY

## 2023-07-07 RX ORDER — HYDROCHLOROTHIAZIDE 12.5 MG/1
12.5 TABLET ORAL DAILY
COMMUNITY
Start: 2023-06-21 | End: 2023-09-26

## 2023-07-07 RX ORDER — POTASSIUM CHLORIDE 20 MEQ/1
40 TABLET, EXTENDED RELEASE ORAL
Status: DISCONTINUED | OUTPATIENT
Start: 2023-07-07 | End: 2023-07-07

## 2023-07-07 RX ADMIN — POTASSIUM BICARBONATE 25 MEQ: 977.5 TABLET, EFFERVESCENT ORAL at 05:07

## 2023-07-07 RX ADMIN — FUROSEMIDE 40 MG: 10 INJECTION, SOLUTION INTRAMUSCULAR; INTRAVENOUS at 05:07

## 2023-07-07 NOTE — ED PROVIDER NOTES
Encounter Date: 7/7/2023       History     Chief Complaint   Patient presents with    Leg Swelling     BILAT RED AND SWOLLEN. X 2 WEEKS, PT SEEN AND TREATED WITH ANTIBX. NOT BETTER     HPI  48-year-old history of bipolar, hepatitis-C, previous IV drug use presents complaining of leg swelling.  Patient reports that for the last 2 weeks he has had increasing pain and swelling of bilateral lower extremities extending up to his lower abdomen.  This has been constant and progressive.  It has been associated with worsening orthopnea and some shortness of breath on exertion as well as a nonproductive cough.    Denies chest pain, fever, chills, hemoptysis, history of cardiac issues.  Patient reports he is had hepatitis-C from IV drug use diagnosed many years ago and has an appointment to start treatment for this on Monday.    Review of patient's allergies indicates:  No Known Allergies  Past Medical History:   Diagnosis Date    Bipolar disorder     Hepatitis C     OD (overdose of drug)     Seizures      No past surgical history on file.  No family history on file.  Social History     Tobacco Use    Smoking status: Every Day   Substance Use Topics    Alcohol use: Yes    Drug use: Yes     Review of Systems   Constitutional:  Negative for fever.   HENT:  Negative for sore throat.    Eyes:  Negative for itching.   Respiratory:  Positive for shortness of breath.    Cardiovascular:  Negative for chest pain.   Gastrointestinal:  Negative for nausea.   Endocrine: Negative for heat intolerance.   Genitourinary:  Negative for dysuria.   Musculoskeletal:  Negative for back pain.   Skin:  Negative for rash.   Allergic/Immunologic: Negative for food allergies.   Neurological:  Negative for weakness.   Hematological:  Does not bruise/bleed easily.   Psychiatric/Behavioral:  Negative for confusion.      Physical Exam     Initial Vitals [07/07/23 1332]   BP Pulse Resp Temp SpO2   135/87 90 16 98.7 °F (37.1 °C) 97 %      MAP       --          Physical Exam    Constitutional: He appears well-developed and well-nourished.   HENT:   Head: Normocephalic and atraumatic. Hair is normal.   Eyes: Conjunctivae and EOM are normal.   Neck: Neck supple. No stridor present.   Normal range of motion.  Cardiovascular:  Normal rate.           No murmur heard.  Pulmonary/Chest: Breath sounds normal. No respiratory distress.   Abdominal: Abdomen is soft. There is no abdominal tenderness.   Musculoskeletal:      Cervical back: Normal range of motion and neck supple.      Comments: Impressive bilateral lower extremity pitting edema extending up to the lower abdomen, not significantly warm or red     Neurological: He is alert and oriented to person, place, and time.   Skin: Skin is warm and dry.   Psychiatric: He has a normal mood and affect. His speech is normal.       ED Course   Procedures  PGY5 MDM:   48-year-old history of bipolar disorder, hepatitis-C presents complaining of bilateral lower extremity swelling for the last 2 weeks with orthopnea.  Vital signs stable on arrival.  On exam patient has impressive bilateral lower extremity pitting edema.  Lung sounds are clear patient is overall fairly well-appearing.  Labs significant for anemia and thrombocytopenia, electrolyte significant for hypokalemia to 3.4 and hyponatremia 126 from a baseline of 137.  Chest x-ray with small unilateral pleural effusion.  This most concerning for new onset decompensated cirrhosis.  However given his new hyponatremia and hypokalemia in the fact that he needs aggressive diuresis will plan to admit for IV diuresis and frequent electrolyte checks.  Treated with Lasix 40 IV and potassium.  However after admission patient now declines admission wants to leave against medical advice.  He was advised of the risks of leaving against medical advice up to including death, he is not clinically intoxicated, he is not altered, he has been given alternatives he is refused those as well, he knows  he can come back to the emergency department if his symptoms worsen or if he has any other concerns.    Amaury Marrero MD  [facility] -[address] [place] [address] ED  [phone]  9:23 PM 7/7/2023       Labs Reviewed   CBC W/ AUTO DIFFERENTIAL - Abnormal; Notable for the following components:       Result Value    RBC 4.24 (*)     Hemoglobin 13.3 (*)     Hematocrit 37.1 (*)     MCH 31.4 (*)     Platelets 128 (*)     Eos # 0.7 (*)     Lymph % 14.1 (*)     Eosinophil % 8.5 (*)     All other components within normal limits   COMPREHENSIVE METABOLIC PANEL - Abnormal; Notable for the following components:    Sodium 126 (*)     Potassium 3.4 (*)     Chloride 94 (*)     BUN <5 (*)     Calcium 8.3 (*)     Albumin 3.4 (*)     Total Bilirubin 1.4 (*)     AST 48 (*)     Anion Gap 5 (*)     All other components within normal limits   CULTURE, BLOOD   CULTURE, BLOOD   B-TYPE NATRIURETIC PEPTIDE   TROPONIN I HIGH SENSITIVITY   TROPONIN I HIGH SENSITIVITY   PROTIME-INR   URINALYSIS, REFLEX TO URINE CULTURE   HEPATITIS PANEL, ACUTE          Imaging Results              X-Ray Chest AP Portable (In process)                      US Lower Extremity Veins Bilateral (Final result)  Result time 07/07/23 15:17:44      Final result by Chaka Bess MD (07/07/23 15:17:44)                   Narrative:    US LOWER EXTREMITY VEINS BILATERAL    CLINICAL HISTORY:  48 years Male bilateral leg swelling    FINDINGS: Grayscale, color and spectral Doppler analysis of the bilateral lower extremity deep venous system was performed.    There is normal compressibility, with normal flow by color and spectral Doppler analysis in the bilateral lower extremity deep venous system, with normal augmentation and no evidence of deep venous thrombosis.    IMPRESSION: Negative for bilateral lower extremity DVT.    Electronically signed by:  Chaka Bess MD  7/7/2023 3:17 PM CDT Workstation: [id number]                                      Medications   potassium bicarbonate disintegrating tablet 25 mEq (25 mEq Oral Given 7/7/23 1728)   furosemide injection 40 mg (40 mg Intravenous Given 7/7/23 1728)                Attending Attestation:   Physician Attestation Statement for Resident:  As the supervising MD   Physician Attestation Statement: I have personally seen and examined this patient.   I agree with the above history.  -: Patient presents with worsening peripheral edema despite been placed on furosemide.  He does report some orthopnea.  No chest pain, pleurisy hemoptysis.  Patient does admit to daily IV drug abuse with heroin.  History of hepatitis-C.  There is no fever chills.   -: Patient is alert, mental status is clear.  No delusions hallucinations.  No scleral icterus.  No abdominal distention.  Lungs clear to auscultation bilaterally.  There is 2+ bilateral pretibial edema.   As the supervising MD I agree with the above treatment, course, plan, and disposition.   -: Patient presents with anasarca.  Patient is failing outpatient treatment with Lasix.  Patient with significant hyponatremia of 126.  Feel uncomfortable with outpatient diuresis with low sodium.  Hospitalist consulted.  Patient is adamantly refusing admission.  Patient is leaving against medical advice.  Patient with no clinical indications to indicate endocarditis other infectious etiology.   I have reviewed and agree with the residents interpretation of the following: lab data, x-rays, EKG and rhythm strips.  I have reviewed the following: old records at this facility.              ED Course as of 07/07/23 2131 Fri Jul 07, 2023   1653 Lactic acid, plasma [TF]      ED Course User Index  [TF] Jose Marrero MD                 Clinical Impression:   Final diagnoses:  [R60.9] Swelling  [R06.02] Shortness of breath        ED Disposition Condition    AMA                 Jose Marrero MD  Resident  07/07/23 2127       Onur Nuñez MD  07/07/23 2131

## 2023-07-07 NOTE — CONSULTS
Patient is a 48-year-old male with history of hep C, bipolar, IV heroin user daily, smoker 1 1/2 pack-a-day.  Patient presented to the ED with a sodium level 126 hospitalist asked to admit.  Patient was evaluated however stated he did not want to stay and wanted to see his PCP on Monday.  He states he has an appointment Monday and only came to the emergency department because his doctor could not get him in today.  Reports that his physician is very well aware of his lower leg swelling for the last 2 weeks and requests not to be admitted.  Care was returned back to the emergency department for further disposition.

## 2023-07-07 NOTE — PHARMACY MED REC
"Admission Medication History     The home medication history was taken by Scotty Ribeiro.    You may go to "Admission" then "Reconcile Home Medications" tabs to review and/or act upon these items.     The home medication list has been updated by the Pharmacy department.   Please read ALL comments highlighted in yellow.   Please address this information as you see fit.    Feel free to contact us if you have any questions or require assistance.      Medications listed below were obtained from: Patient/family and Analytic software- [employer].  No current facility-administered medications on file prior to encounter.     Current Outpatient Medications on File Prior to Encounter   Medication Sig Dispense Refill    clonazePAM (KLONOPIN) 1 MG tablet Take 1 mg by mouth 2 (two) times daily as needed for Anxiety.      hydroCHLOROthiazide (HYDRODIURIL) 12.5 MG Tab Take 12.5 mg by mouth once daily.      OLANZAPINE ORAL Take 1 tablet by mouth nightly.      oxcarbazepine (TRILEPTAL ORAL) Take 1 tablet by mouth 2 (two) times a day.      albuterol (PROVENTIL/VENTOLIN HFA) 90 mcg/actuation inhaler Inhale 2 puffs into the lungs every 6 (six) hours as needed for Wheezing. Rescue 18 g 0    albuterol-ipratropium (DUO-NEB) 2.5 mg-0.5 mg/3 mL nebulizer solution Take 3 mLs by nebulization every 6 (six) hours as needed for Wheezing. Rescue 75 mL 0    ascorbic acid, vitamin C, (VITAMIN C) 500 MG tablet Take 1 tablet (500 mg total) by mouth 3 (three) times daily.      azelastine (ASTELIN) 137 mcg (0.1 %) nasal spray 1 spray (137 mcg total) by Nasal route 2 (two) times daily. for 7 days 30 mL 0    buPROPion (WELLBUTRIN XL) 300 MG 24 hr tablet Take 300 mg by mouth once daily.      cetirizine (ZYRTEC) 10 MG tablet Take 1 tablet (10 mg total) by mouth once daily. 30 tablet 0    dexchlorphen-phenylephrine-DM (POLYTUSSIN DM) 1-5-10 mg/5 mL Syrp Take 5 mLs by mouth every 4 (four) hours as needed. (Patient not taking: Reported on 1/31/2023) 120 mL 0    " fluticasone (FLONASE) 50 mcg/actuation nasal spray 2 sprays (100 mcg total) by Each Nare route once daily. (Patient not taking: Reported on 1/31/2023) 15.8 mL 0    hydrocodone-acetaminophen 10-325mg (NORCO)  mg Tab Take 1 tablet by mouth every 6 (six) hours as needed. (Patient not taking: Reported on 1/31/2023)  0    inhalation spacing device (BREATHERITE MDI SPACER) Use as directed for inhalation. 1 each 0    sertraline (ZOLOFT) 100 MG tablet Take 100 mg by mouth once daily.      thiamine 100 MG tablet Take 1 tablet (100 mg total) by mouth once daily. (Patient not taking: Reported on 1/31/2023)      traZODone (DESYREL) 50 MG tablet Take 50 mg by mouth every evening.      triamcinolone acetonide 0.1% (KENALOG) 0.1 % cream APPLY TO THE AFFECTED AREA(S) TWICE DAILY         Potential issues to be addressed PRIOR TO DISCHARGE  Please discuss with the patient barriers to adherence with medication treatment plans  Patient requires education regarding drug therapies   Please review medications and therapies.    Scotty Ribeiro  [id number]                .

## 2023-07-12 LAB
BACTERIA BLD CULT: NORMAL
BACTERIA BLD CULT: NORMAL

## 2023-07-19 LAB
DIAGNOSTIC IMP SPEC-IMP: NORMAL
HAV IGM SERPL QL IA: NEGATIVE
HBV CORE IGM SERPL QL IA: NEGATIVE
HBV SURFACE AG SERPL QL IA: NEGATIVE
HCV AB S/CO SERPL IA: REACTIVE
HCV RNA SERPL NAA+PROBE-ACNC: NORMAL IU/ML
HCV RNA SERPL NAA+PROBE-LOG IU: 5.17 LOG10 IU/ML
REF LAB TEST REF RANGE: NORMAL

## 2023-08-16 ENCOUNTER — HOSPITAL ENCOUNTER (EMERGENCY)
Facility: HOSPITAL | Age: 48
Discharge: HOME | End: 2023-08-16
Attending: EMERGENCY MEDICINE
Payer: MEDICAID

## 2023-08-16 VITALS
SYSTOLIC BLOOD PRESSURE: 104 MMHG | HEIGHT: 68 IN | RESPIRATION RATE: 18 BRPM | HEART RATE: 80 BPM | BODY MASS INDEX: 28.95 KG/M2 | WEIGHT: 191 LBS | TEMPERATURE: 98 F | OXYGEN SATURATION: 95 % | DIASTOLIC BLOOD PRESSURE: 59 MMHG

## 2023-08-16 DIAGNOSIS — R60.1 ANASARCA: Primary | ICD-10-CM

## 2023-08-16 DIAGNOSIS — K74.60 CIRRHOSIS OF LIVER WITH ASCITES, UNSPECIFIED HEPATIC CIRRHOSIS TYPE: ICD-10-CM

## 2023-08-16 DIAGNOSIS — M79.89 LEG SWELLING: ICD-10-CM

## 2023-08-16 DIAGNOSIS — R18.8 CIRRHOSIS OF LIVER WITH ASCITES, UNSPECIFIED HEPATIC CIRRHOSIS TYPE: ICD-10-CM

## 2023-08-16 LAB
ALBUMIN SERPL BCP-MCNC: 3.5 G/DL (ref 3.5–5.2)
ALP SERPL-CCNC: 116 U/L (ref 55–135)
ALT SERPL W/O P-5'-P-CCNC: 34 U/L (ref 10–44)
ANION GAP SERPL CALC-SCNC: 7 MMOL/L (ref 8–16)
AST SERPL-CCNC: 63 U/L (ref 10–40)
BASOPHILS # BLD AUTO: 0.06 K/UL (ref 0–0.2)
BASOPHILS NFR BLD: 0.7 % (ref 0–1.9)
BILIRUB SERPL-MCNC: 2.2 MG/DL (ref 0.1–1)
BNP SERPL-MCNC: 85 PG/ML (ref 0–99)
BUN SERPL-MCNC: <5 MG/DL (ref 6–20)
CALCIUM SERPL-MCNC: 8.5 MG/DL (ref 8.7–10.5)
CHLORIDE SERPL-SCNC: 104 MMOL/L (ref 95–110)
CO2 SERPL-SCNC: 25 MMOL/L (ref 23–29)
CREAT SERPL-MCNC: 0.7 MG/DL (ref 0.5–1.4)
DIFFERENTIAL METHOD: ABNORMAL
EOSINOPHIL # BLD AUTO: 0.6 K/UL (ref 0–0.5)
EOSINOPHIL NFR BLD: 7.6 % (ref 0–8)
ERYTHROCYTE [DISTWIDTH] IN BLOOD BY AUTOMATED COUNT: 13.3 % (ref 11.5–14.5)
EST. GFR  (NO RACE VARIABLE): >60 ML/MIN/1.73 M^2
GLUCOSE SERPL-MCNC: 110 MG/DL (ref 70–110)
HCT VFR BLD AUTO: 44.3 % (ref 40–54)
HGB BLD-MCNC: 15.2 G/DL (ref 14–18)
IMM GRANULOCYTES # BLD AUTO: 0.03 K/UL (ref 0–0.04)
IMM GRANULOCYTES NFR BLD AUTO: 0.4 % (ref 0–0.5)
INR PPP: 1.1 (ref 0.8–1.2)
LYMPHOCYTES # BLD AUTO: 1.2 K/UL (ref 1–4.8)
LYMPHOCYTES NFR BLD: 14.8 % (ref 18–48)
MCH RBC QN AUTO: 31.5 PG (ref 27–31)
MCHC RBC AUTO-ENTMCNC: 34.3 G/DL (ref 32–36)
MCV RBC AUTO: 92 FL (ref 82–98)
MONOCYTES # BLD AUTO: 0.6 K/UL (ref 0.3–1)
MONOCYTES NFR BLD: 7.4 % (ref 4–15)
NEUTROPHILS # BLD AUTO: 5.6 K/UL (ref 1.8–7.7)
NEUTROPHILS NFR BLD: 69.1 % (ref 38–73)
NRBC BLD-RTO: 0 /100 WBC
PLATELET # BLD AUTO: 133 K/UL (ref 150–450)
PMV BLD AUTO: 10 FL (ref 9.2–12.9)
POTASSIUM SERPL-SCNC: 3.6 MMOL/L (ref 3.5–5.1)
PROT SERPL-MCNC: 7.7 G/DL (ref 6–8.4)
PROTHROMBIN TIME: 12.4 SEC (ref 9–12.5)
RBC # BLD AUTO: 4.82 M/UL (ref 4.6–6.2)
SODIUM SERPL-SCNC: 136 MMOL/L (ref 136–145)
WBC # BLD AUTO: 8.15 K/UL (ref 3.9–12.7)

## 2023-08-16 PROCEDURE — 85610 PROTHROMBIN TIME: CPT | Performed by: EMERGENCY MEDICINE

## 2023-08-16 PROCEDURE — 80053 COMPREHEN METABOLIC PANEL: CPT | Performed by: EMERGENCY MEDICINE

## 2023-08-16 PROCEDURE — 93010 ELECTROCARDIOGRAM REPORT: CPT | Mod: ,,, | Performed by: INTERNAL MEDICINE

## 2023-08-16 PROCEDURE — 99285 EMERGENCY DEPT VISIT HI MDM: CPT | Mod: 25

## 2023-08-16 PROCEDURE — 83880 ASSAY OF NATRIURETIC PEPTIDE: CPT | Performed by: EMERGENCY MEDICINE

## 2023-08-16 PROCEDURE — 96374 THER/PROPH/DIAG INJ IV PUSH: CPT

## 2023-08-16 PROCEDURE — 93005 ELECTROCARDIOGRAM TRACING: CPT | Performed by: INTERNAL MEDICINE

## 2023-08-16 PROCEDURE — 63600175 PHARM REV CODE 636 W HCPCS: Performed by: EMERGENCY MEDICINE

## 2023-08-16 PROCEDURE — 25500020 PHARM REV CODE 255: Performed by: EMERGENCY MEDICINE

## 2023-08-16 PROCEDURE — 93010 EKG 12-LEAD: ICD-10-PCS | Mod: ,,, | Performed by: INTERNAL MEDICINE

## 2023-08-16 PROCEDURE — 96375 TX/PRO/DX INJ NEW DRUG ADDON: CPT

## 2023-08-16 PROCEDURE — 85025 COMPLETE CBC W/AUTO DIFF WBC: CPT | Performed by: EMERGENCY MEDICINE

## 2023-08-16 RX ORDER — FUROSEMIDE 20 MG/1
20 TABLET ORAL DAILY
Qty: 20 TABLET | Refills: 0 | Status: SHIPPED | OUTPATIENT
Start: 2023-08-16 | End: 2024-03-19 | Stop reason: SDUPTHER

## 2023-08-16 RX ORDER — POTASSIUM CHLORIDE 750 MG/1
10 TABLET, EXTENDED RELEASE ORAL DAILY
Qty: 20 TABLET | Refills: 0 | Status: SHIPPED | OUTPATIENT
Start: 2023-08-16 | End: 2024-03-19

## 2023-08-16 RX ORDER — FUROSEMIDE 10 MG/ML
40 INJECTION INTRAMUSCULAR; INTRAVENOUS
Status: COMPLETED | OUTPATIENT
Start: 2023-08-16 | End: 2023-08-16

## 2023-08-16 RX ORDER — DIPHENHYDRAMINE HYDROCHLORIDE 50 MG/ML
25 INJECTION INTRAMUSCULAR; INTRAVENOUS
Status: COMPLETED | OUTPATIENT
Start: 2023-08-16 | End: 2023-08-16

## 2023-08-16 RX ADMIN — DIPHENHYDRAMINE HYDROCHLORIDE 25 MG: 50 INJECTION INTRAMUSCULAR; INTRAVENOUS at 09:08

## 2023-08-16 RX ADMIN — FUROSEMIDE 40 MG: 10 INJECTION, SOLUTION INTRAMUSCULAR; INTRAVENOUS at 09:08

## 2023-08-16 RX ADMIN — IOHEXOL 100 ML: 350 INJECTION, SOLUTION INTRAVENOUS at 10:08

## 2023-08-16 NOTE — FIRST PROVIDER EVALUATION
"Medical screening examination initiated.  I have conducted a focused provider triage encounter, findings are as follows:    Brief history of present illness:  Patient presents to ED for concern for bilateral lower leg swelling some weeks.  Patient denies chest pain or shortness of breath.  Patient reports he was in his primary care provider for this and they wanted to do a procedure but is insurance has not yet approved the procedure.  Patient is unsure what the procedure is.    Vitals:    08/16/23 1332   BP: (!) 136/92   BP Location: Left arm   Patient Position: Sitting   Pulse: 76   Resp: 18   Temp: 97.8 °F (36.6 °C)   TempSrc: Oral   SpO2: 97%   Weight: 86.6 kg (191 lb)   Height: 5' 8" (1.727 m)       Pertinent physical exam:  Patient is awake and alert in no acute distress.    Brief workup plan:  Labs, ultrasound    Preliminary workup initiated; this workup will be continued and followed by the physician or advanced practice provider that is assigned to the patient when roomed.  "

## 2023-08-17 NOTE — ED NOTES
Patient voiced wishes to leave AMA. Informed MD , upon return to room, patient and belongings were gone from room. MD informed of eloped status.

## 2023-08-17 NOTE — ED PROVIDER NOTES
Encounter Date: 8/16/2023       History     Chief Complaint   Patient presents with    Leg Swelling     BILAT X COUPLE MOS, WORSE LAST WEEK     48-year-old male presented emergency department with complains of having abdominal distention and edema in both legs.  Patient said he had this for several months.  Patient in the process of getting treatment for hepatitis C. patient also states he uses heroin.  Denies fever or chills.  Denies alcohol use.  Denies any focal weakness or numbness.  Denies chest pain or shortness of breath.      Review of patient's allergies indicates:  No Known Allergies  Past Medical History:   Diagnosis Date    Bipolar disorder     Hepatitis C     OD (overdose of drug)     Seizures      No past surgical history on file.  No family history on file.  Social History     Tobacco Use    Smoking status: Every Day     Current packs/day: 0.00   Substance Use Topics    Alcohol use: Yes    Drug use: Yes     Review of Systems   Constitutional: Negative.    HENT: Negative.     Eyes: Negative.    Respiratory: Negative.     Cardiovascular:  Positive for leg swelling.   Gastrointestinal:  Positive for abdominal distention. Negative for abdominal pain, blood in stool, constipation, diarrhea, nausea, rectal pain and vomiting.   Endocrine: Negative.    Genitourinary: Negative.    Musculoskeletal: Negative.    Skin: Negative.    Allergic/Immunologic: Negative.    Neurological: Negative.    Hematological: Negative.    Psychiatric/Behavioral:  Negative for agitation.    All other systems reviewed and are negative.      Physical Exam     Initial Vitals [08/16/23 1332]   BP Pulse Resp Temp SpO2   (!) 136/92 76 18 97.8 °F (36.6 °C) 97 %      MAP       --         Physical Exam    Nursing note and vitals reviewed.  Constitutional: He appears well-developed and well-nourished.   HENT:   Head: Normocephalic and atraumatic.   Nose: Nose normal.   Eyes: Conjunctivae and EOM are normal.   Neck: No tracheal deviation  present.   Normal range of motion.  Cardiovascular:  Normal rate, regular rhythm, normal heart sounds and intact distal pulses.     Exam reveals no friction rub.       No murmur heard.  Pulmonary/Chest: Breath sounds normal. No respiratory distress. He has no wheezes. He has no rales.   Abdominal: Abdomen is soft. He exhibits distension. There is no abdominal tenderness.   Musculoskeletal:         General: Edema present. No tenderness. Normal range of motion.      Cervical back: Normal range of motion.      Comments: Bilateral lower extremity 3+ pitting edema.  Extremities neurovascularly intact     Neurological: He is alert and oriented to person, place, and time. He has normal strength.   Skin: Skin is warm and dry. Capillary refill takes less than 2 seconds.   Psychiatric: He has a normal mood and affect. Thought content normal.         ED Course   Procedures  Labs Reviewed   CBC W/ AUTO DIFFERENTIAL - Abnormal; Notable for the following components:       Result Value    MCH 31.5 (*)     Platelets 133 (*)     Eos # 0.6 (*)     Lymph % 14.8 (*)     All other components within normal limits   COMPREHENSIVE METABOLIC PANEL - Abnormal; Notable for the following components:    BUN <5 (*)     Calcium 8.5 (*)     Total Bilirubin 2.2 (*)     AST 63 (*)     Anion Gap 7 (*)     All other components within normal limits   B-TYPE NATRIURETIC PEPTIDE   PROTIME-INR     EKG Readings: (Independently Interpreted)   Initial Reading: No STEMI. Rhythm: Normal Sinus Rhythm. Ectopy: No Ectopy.     ECG Results              EKG 12-lead (In process)  Result time 08/16/23 14:48:11      In process by Interface, Lab In [facility] (08/16/23 14:48:11)                   Narrative:    Test Reason : M79.89,    Vent. Rate : 072 BPM     Atrial Rate : 072 BPM     P-R Int : 134 ms          QRS Dur : 082 ms      QT Int : 418 ms       P-R-T Axes : 057 -01 026 degrees     QTc Int : 457 ms    Normal sinus rhythm  Cannot rule out Anterior infarct (cited on  or before 07-JUL-2023)  Abnormal ECG  When compared with ECG of 07-JUL-2023 19:16,  Questionable change in The axis    Referred By: [email or web address]   SELF           Confirmed By:                                   Imaging Results              CT Abdomen Pelvis With Contrast (Final result)  Result time 08/16/23 22:37:08      Final result by Jamila Fox MD (08/16/23 22:37:08)                   Narrative:    EXAM DESCRIPTION:  CT ABDOMEN PELVIS WITH CONTRAST    CLINICAL HISTORY:  48 years  Male;  Lymphadenopathy, groin; Abdominal distention    TECHNIQUE:  CT of the abdomen and pelvis with intravenous contrast.. Oral contrastWas not used.  All CT scans at this facility use dose modulation, iterative reconstruction, and/or weight based dosing when appropriate to reduce radiation dose to as low as reasonably achievable.    This exam was performed according to our department optimization program which includes automated exposure control, adjustment of the mA and/or kv according to patient size and/or use of iterative reconstruction technique.    COMPARISON: None.    FINDINGS:  Lower chest: A small to moderate left pleural effusion is noted. Heart size is normal. There is compressive volume loss in the left lower lobe. There are prominent enhancing vessels seen adjacent to the distal esophagus suggesting varices.    Abdomen:  Liver and biliary tree: There is subtle fatty infiltration of the liver and the liver has a nodular appearance. Portal vein is patent. Hepatic veins are not yet opacified. Gallbladder is distended. No definitive biliary dilatation.  Pancreas: Within normal limits  Spleen: The spleen is enlarged measuring 16.7 cm in length.  Kidneys:  Kidneys are normal in size, shape and position.  No stones.  No mass or hydronephrosis. Symmetric renal enhancement. No perinephric abnormality.  Adrenal glands:Within normal limits  Vascular structures: Splenic vein is dilated. There are tortuous vessels seen adjacent to the  gastric fundus and the distal esophagus suggesting varices. There is minimal calcified plaque in the aorta. No aneurysm.  Retroperitoneum:  No mass or lymphadenopathy  Abdominal wall:  normal  GI:Bowel is of normal caliber.  No focal bowel wall thickening.  No obstruction.   Appendix: The appendix appears normal.  General:  No free air.  No free fluid    Pelvis:  Lymph nodes:  In the proximal thigh lymph nodes are seen which measure up to 3.2 cm. Lymph nodes are present in both inguinal regions. Etiology is unknown.  Bladder: Urinary bladder is distended.  Pelvis: No pelvic mass or adenopathy.  Bones: No acute bone findings.    IMPRESSION:  1.  Small to moderate left pleural effusion with compressive volume loss in the left lower lobe.  2.  Changes of cirrhosis and portal hypertension with splenomegaly and varices.  3.  Bilateral inguinal and proximal thigh lymphadenopathy of unknown etiology.    Electronically signed by:  Jamila Fox MD  8/16/2023 10:37 PM CDT Workstation: [id number]                                     US Lower Extremity Veins Bilateral (Final result)  Result time 08/16/23 16:19:12      Final result by Keagan Huber MD (08/16/23 16:19:12)                   Narrative:    VENOUS DOPPLER ULTRASOUND BILATERAL LOWER EXTREMITIES    CLINICAL DATA: Leg swelling    FINDINGS: Color and duplex Doppler sonographic assessment with spectral analysis of the bilateral lower extremities demonstrates no evidence of deep vein thrombosis. Normal color Doppler flow, augmentation, and compressibility are demonstrated at all levels.    There is mild nonspecific subcutaneous edema in the lower legs bilaterally. Borderline prominent inguinal lymph nodes are noted bilaterally, all with a short axis of less than 1 cm.    IMPRESSION:  1. No evidence of DVT in the bilateral lower extremities.  2. Mild nonspecific subcutaneous edema in the lower legs bilaterally.  3. Borderline prominent inguinal lymph nodes,  nonspecific.    Electronically signed by:  Keagan Huber MD  8/16/2023 4:19 PM CDT Workstation: [id number]                                     X-Ray Chest PA And Lateral (Final result)  Result time 08/16/23 13:58:07      Final result by Rachael Gandhi MD (08/16/23 13:58:07)                   Narrative:    Reason: LEG SWELLING Bilat leg swelling    FINDINGS:  PA and lateral chest without comparisons show normal cardiomediastinal silhouette.    Lungs are clear. Pulmonary vasculature is normal. There is moderate compression of the midthoracic vertebral body. This is most likely chronic. There are degenerative changes of the thoracic spine.    IMPRESSION:  No acute pulmonary process    Moderate compression of a mid thoracic vertebral body is likely chronic    Electronically signed by:  Rachael Gandhi MD  8/16/2023 1:58 PM CDT Workstation: [id number]                                     Medications   furosemide injection 40 mg (40 mg Intravenous Given 8/16/23 2131)   diphenhydrAMINE injection 25 mg (25 mg Intravenous Given 8/16/23 2132)   iohexoL (OMNIPAQUE 350) injection 100 mL (100 mLs Intravenous Given 8/16/23 2206)     Medical Decision Making:   Differential Diagnosis:   48-year-old male with hepatitis-C presented emergency department with edema in the legs.  Patient does have inguinal lymphadenopathy bilaterally.  Patient also has ascites and evidence of cirrhosis noted on CT scan.  Patient advised to follow-up with primary care and to follow-up with gastroenterology as outpatient.  Patient has an appointment scheduled for his hepatitis-C treatment.  Patient did urinate and had diuresis with treatment  Independently Interpreted Test(s):   I have ordered and independently interpreted X-rays - see prior notes.  I have ordered and independently interpreted EKG Reading(s) - see prior notes  Clinical Tests:   Lab Tests: Reviewed  Radiological Study: Reviewed  Medical Tests: Reviewed                           Clinical Impression:   Final diagnoses:  [M79.89] Leg swelling  [R60.1] Anasarca (Primary)  [K74.60, R18.8] Cirrhosis of liver with ascites, unspecified hepatic cirrhosis type        ED Disposition Condition    Discharge Stable          ED Prescriptions       Medication Sig Dispense Start Date End Date Auth. Provider    furosemide (LASIX) 20 MG tablet Take 1 tablet (20 mg total) by mouth once daily. 20 tablet 8/16/2023 8/15/2024 Harrison Graves MD    potassium chloride (KLOR-CON) 10 MEQ TbSR Take 1 tablet (10 mEq total) by mouth once daily. 20 tablet 8/16/2023 -- Harrison Graves MD          Follow-up Information       Follow up With Specialties Details Why Contact Info    [facility], Psychiatry In 2 days  [address] [place] [address]  [place] [id number]  [phone]      [facility]  In 2 days  [address] [facility]  [place] [id number]  [phone]               Harrison Graves MD  08/16/23 7049

## 2023-08-17 NOTE — ED NOTES
"Patient returned to room/ED and stated "Can I just stay now?" .     MD notified. ER evaluation/treatment continued.   "

## 2023-08-17 NOTE — DISCHARGE INSTRUCTIONS
Do not use drugs.  Rest.  Return to emergency department for worsening symptoms or any problems.  Keep you feet elevated.  You must follow-up with primary care and Gastroenterology and should start treatment for hepatitis-C.  You have lymphadenopathy of groins

## 2023-08-30 DIAGNOSIS — R16.1 SPLENOMEGALY: Primary | ICD-10-CM

## 2023-08-31 ENCOUNTER — HOSPITAL ENCOUNTER (OUTPATIENT)
Dept: RADIOLOGY | Facility: HOSPITAL | Age: 48
Discharge: HOME | End: 2023-08-31
Payer: MEDICAID

## 2023-08-31 DIAGNOSIS — R16.1 SPLENOMEGALY: ICD-10-CM

## 2023-08-31 PROCEDURE — 76705 ECHO EXAM OF ABDOMEN: CPT | Mod: TC

## 2023-08-31 PROCEDURE — 76705 ECHO EXAM OF ABDOMEN: CPT | Mod: 26,,, | Performed by: RADIOLOGY

## 2023-08-31 PROCEDURE — 76705 US ABDOMEN LIMITED: ICD-10-PCS | Mod: 26,,, | Performed by: RADIOLOGY

## 2023-09-07 ENCOUNTER — TELEPHONE (OUTPATIENT)
Dept: TRANSPLANT | Facility: CLINIC | Age: 48
End: 2023-09-07
Payer: MEDICAID

## 2023-09-07 NOTE — TELEPHONE ENCOUNTER
----- Message from Sapna Singh RN sent at 9/1/2023  1:00 PM CDT -----  Regarding: hepatitis C referral  Hepatitis referral received and scanned into media; pt chart sent to referral nurse for medical review.    Insurance info in Epic     Referring Provider: Seble Castillo NP  Phone: [phone]  Fax: [phone]

## 2023-09-08 ENCOUNTER — TELEPHONE (OUTPATIENT)
Dept: TRANSPLANT | Facility: CLINIC | Age: 48
End: 2023-09-08
Payer: MEDICAID

## 2023-09-08 ENCOUNTER — TELEPHONE (OUTPATIENT)
Dept: HEPATOLOGY | Facility: CLINIC | Age: 48
End: 2023-09-08
Payer: MEDICAID

## 2023-09-08 NOTE — TELEPHONE ENCOUNTER
Patient hep c quant positive.  I spoke with him and attempted to setup a consult visit with PA Scheuermann in the hepatology clinic to discuss treatment for hep c.  Patient states that he's already scheduled to see Dr. Guzman in [place] on 9/27/23 to discuss treatment.  He did not want to make an appt with [facility].  I told him that if anything changed to call us back for scheduling.

## 2023-09-08 NOTE — TELEPHONE ENCOUNTER
----- Message from Sapna Singh RN sent at 9/1/2023  1:00 PM CDT -----  Regarding: hepatitis C referral  Hepatitis referral received and scanned into media; pt chart sent to referral nurse for medical review.    Insurance info in Epic     Referring Provider: Seble Castillo NP  Phone: [phone]  Fax: [phone]

## 2023-09-08 NOTE — TELEPHONE ENCOUNTER
----- Message from Gavi Gould sent at 9/8/2023  1:05 PM CDT -----  Regarding: referral  Regarding: hepatitis C referral/ with cirrhosis MELD 11  Hepatitis referral received and scanned into media; pt chart sent to referral nurse for medical review.    Insurance info in Epic     Referring Provider: Seble Castillo NP  Phone: [phone]  Fax: [phone]

## 2023-09-12 DIAGNOSIS — F17.210 NICOTINE DEPENDENCE, CIGARETTES, UNCOMPLICATED: Primary | ICD-10-CM

## 2023-09-18 ENCOUNTER — HOSPITAL ENCOUNTER (OUTPATIENT)
Dept: RADIOLOGY | Facility: HOSPITAL | Age: 48
Discharge: HOME | End: 2023-09-18
Payer: MEDICAID

## 2023-09-18 DIAGNOSIS — F17.210 NICOTINE DEPENDENCE, CIGARETTES, UNCOMPLICATED: ICD-10-CM

## 2023-09-18 PROCEDURE — 71271 CT THORAX LUNG CANCER SCR C-: CPT | Mod: TC,PO

## 2023-09-26 ENCOUNTER — OFFICE VISIT (OUTPATIENT)
Dept: HEPATOLOGY | Facility: CLINIC | Age: 48
End: 2023-09-26
Payer: MEDICAID

## 2023-09-26 ENCOUNTER — LAB VISIT (OUTPATIENT)
Dept: LAB | Facility: HOSPITAL | Age: 48
End: 2023-09-26
Attending: NURSE PRACTITIONER
Payer: MEDICAID

## 2023-09-26 VITALS — WEIGHT: 183.63 LBS | BODY MASS INDEX: 27.83 KG/M2 | HEIGHT: 68 IN

## 2023-09-26 DIAGNOSIS — B18.2 CHRONIC HEPATITIS C WITHOUT HEPATIC COMA: ICD-10-CM

## 2023-09-26 DIAGNOSIS — B18.2 CHRONIC HEPATITIS C WITHOUT HEPATIC COMA: Primary | ICD-10-CM

## 2023-09-26 DIAGNOSIS — K76.6 PORTAL VENOUS HYPERTENSION: ICD-10-CM

## 2023-09-26 DIAGNOSIS — K74.60 HEPATIC CIRRHOSIS, UNSPECIFIED HEPATIC CIRRHOSIS TYPE, UNSPECIFIED WHETHER ASCITES PRESENT: ICD-10-CM

## 2023-09-26 LAB
AFP SERPL-MCNC: 8.2 NG/ML (ref 0–8.4)
ALBUMIN SERPL BCP-MCNC: 3.4 G/DL (ref 3.5–5.2)
ALP SERPL-CCNC: 113 U/L (ref 55–135)
ALT SERPL W/O P-5'-P-CCNC: 84 U/L (ref 10–44)
ANION GAP SERPL CALC-SCNC: 7 MMOL/L (ref 8–16)
AST SERPL-CCNC: 123 U/L (ref 10–40)
BILIRUB SERPL-MCNC: 1.9 MG/DL (ref 0.1–1)
BUN SERPL-MCNC: 3 MG/DL (ref 6–20)
CALCIUM SERPL-MCNC: 8.6 MG/DL (ref 8.7–10.5)
CHLORIDE SERPL-SCNC: 106 MMOL/L (ref 95–110)
CO2 SERPL-SCNC: 26 MMOL/L (ref 23–29)
CREAT SERPL-MCNC: 0.8 MG/DL (ref 0.5–1.4)
EST. GFR  (NO RACE VARIABLE): >60 ML/MIN/1.73 M^2
GLUCOSE SERPL-MCNC: 75 MG/DL (ref 70–110)
HAV IGG SER QL IA: NORMAL
HBV CORE AB SERPL QL IA: NORMAL
HBV SURFACE AB SER-ACNC: <3 MIU/ML
HBV SURFACE AB SER-ACNC: NORMAL M[IU]/ML
HIV 1+2 AB+HIV1 P24 AG SERPL QL IA: NORMAL
INR PPP: 1.2 (ref 0.8–1.2)
POTASSIUM SERPL-SCNC: 3.6 MMOL/L (ref 3.5–5.1)
PROT SERPL-MCNC: 7.3 G/DL (ref 6–8.4)
PROTHROMBIN TIME: 13.1 SEC (ref 9–12.5)
SODIUM SERPL-SCNC: 139 MMOL/L (ref 136–145)

## 2023-09-26 PROCEDURE — 99205 PR OFFICE/OUTPT VISIT, NEW, LEVL V, 60-74 MIN: ICD-10-PCS | Mod: S$PBB,,, | Performed by: PHYSICIAN ASSISTANT

## 2023-09-26 PROCEDURE — 99205 OFFICE O/P NEW HI 60 MIN: CPT | Mod: S$PBB,,, | Performed by: PHYSICIAN ASSISTANT

## 2023-09-26 PROCEDURE — 1159F PR MEDICATION LIST DOCUMENTED IN MEDICAL RECORD: ICD-10-PCS | Mod: CPTII,,, | Performed by: PHYSICIAN ASSISTANT

## 2023-09-26 PROCEDURE — 99213 OFFICE O/P EST LOW 20 MIN: CPT | Mod: PBBFAC,PO | Performed by: PHYSICIAN ASSISTANT

## 2023-09-26 PROCEDURE — 82105 ALPHA-FETOPROTEIN SERUM: CPT | Performed by: PHYSICIAN ASSISTANT

## 2023-09-26 PROCEDURE — 1160F RVW MEDS BY RX/DR IN RCRD: CPT | Mod: CPTII,,, | Performed by: PHYSICIAN ASSISTANT

## 2023-09-26 PROCEDURE — 3008F BODY MASS INDEX DOCD: CPT | Mod: CPTII,,, | Performed by: PHYSICIAN ASSISTANT

## 2023-09-26 PROCEDURE — 1159F MED LIST DOCD IN RCRD: CPT | Mod: CPTII,,, | Performed by: PHYSICIAN ASSISTANT

## 2023-09-26 PROCEDURE — 1160F PR REVIEW ALL MEDS BY PRESCRIBER/CLIN PHARMACIST DOCUMENTED: ICD-10-PCS | Mod: CPTII,,, | Performed by: PHYSICIAN ASSISTANT

## 2023-09-26 PROCEDURE — 86704 HEP B CORE ANTIBODY TOTAL: CPT | Performed by: PHYSICIAN ASSISTANT

## 2023-09-26 PROCEDURE — 85610 PROTHROMBIN TIME: CPT | Mod: PO | Performed by: PHYSICIAN ASSISTANT

## 2023-09-26 PROCEDURE — 86706 HEP B SURFACE ANTIBODY: CPT | Performed by: PHYSICIAN ASSISTANT

## 2023-09-26 PROCEDURE — 87389 HIV-1 AG W/HIV-1&-2 AB AG IA: CPT | Performed by: PHYSICIAN ASSISTANT

## 2023-09-26 PROCEDURE — 99999 PR PBB SHADOW E&M-EST. PATIENT-LVL III: ICD-10-PCS | Mod: PBBFAC,,, | Performed by: PHYSICIAN ASSISTANT

## 2023-09-26 PROCEDURE — 80053 COMPREHEN METABOLIC PANEL: CPT | Performed by: PHYSICIAN ASSISTANT

## 2023-09-26 PROCEDURE — 3008F PR BODY MASS INDEX (BMI) DOCUMENTED: ICD-10-PCS | Mod: CPTII,,, | Performed by: PHYSICIAN ASSISTANT

## 2023-09-26 PROCEDURE — 80321 ALCOHOLS BIOMARKERS 1OR 2: CPT | Performed by: PHYSICIAN ASSISTANT

## 2023-09-26 PROCEDURE — 86790 VIRUS ANTIBODY NOS: CPT | Performed by: PHYSICIAN ASSISTANT

## 2023-09-26 PROCEDURE — 99999 PR PBB SHADOW E&M-EST. PATIENT-LVL III: CPT | Mod: PBBFAC,,, | Performed by: PHYSICIAN ASSISTANT

## 2023-09-26 RX ORDER — OLANZAPINE 20 MG/1
1 TABLET ORAL NIGHTLY
COMMUNITY
Start: 2023-08-30

## 2023-09-26 RX ORDER — VELPATASVIR AND SOFOSBUVIR 100; 400 MG/1; MG/1
1 TABLET, FILM COATED ORAL DAILY
Qty: 28 TABLET | Refills: 5 | Status: ACTIVE | OUTPATIENT
Start: 2023-09-26

## 2023-09-26 NOTE — PROGRESS NOTES
"HEPATOLOGY CLINIC VISIT NOTE - HCV clinic  REFERRING PROVIDER: Seble Castillo NP  CHIEF COMPLAINT: Hepatitis C   (accompanied by: father)    HISTORY       This is a 48 y.o. White male with substance use d/o (ongoing) and bipolar d/o, referred for HCV cirrhosis. He's had recent ER visits (7/2023, 8/2023) for significant fluid retention / anasarca for which admission was recommended but he left AMA. Presently fluid well controlled w/ lasix.     Recently saw medicine provider who did not want to treat HCV "b/c of his liver" and recommended GI eval. Appt scheduled w/ Dr Guzman but hasn't seen him. Reports being told he both did and then did not need liver transplant. Has not received liver specialty care before today.      HCV history:  Originally diagnosed years ago but not treated  - Prior Treatment: No  - Genotype ?  - HCV ,000 IU/mL - 7/2023      Cirrhosis history:  FibroSURE 9/1/23 F4 (CE) - supported by labs / imaging  Decompensated: TBili elevation, fluid retention  Evidence of portal HTN: SM and varices on imaging, low plt 110s-130s    Ascites - no  CHRISTEL - currently well controlled  Diuretic use - yes, lasix 20mg daily + KCl 10mEq daily  TBili elevation - yes, 1s-2s  HE / confusion / memory problems - no  EV bleed / hematemesis / melena - no  Albumin - 3.4-3.5  Platelets - low 110s-130s  Coagulopathy - no INR 1.1    MELD 3.0: 11 at 8/16/2023  2:49 PM  MELD-Na: 10 at 8/16/2023  2:49 PM  Calculated from:  Serum Creatinine: 0.7 mg/dL (Using min of 1 mg/dL) at 8/16/2023  2:49 PM  Serum Sodium: 136 mmol/L at 8/16/2023  2:49 PM  Total Bilirubin: 2.2 mg/dL at 8/16/2023  2:49 PM  Serum Albumin: 3.5 g/dL at 8/16/2023  2:49 PM  INR(ratio): 1.1 at 8/16/2023  2:49 PM  Age at listing (hypothetical): 48 years  Sex: Male at 8/16/2023  2:49 PM    Cirrhosis health maintenance:  - HCC screening - 8/2023 U/S & CT w/ no liver lesions; AFP needed  - Varices screening -  EGD needed  - HAV status - Unknown  - HBV status - " "Unknown immunity. HBsAg neg 7/2023    Substance abuse / mental health:  Chart review indicates bipolar d/o & prior PEC 12/2022 due to aggression and being danger to others, med adherence issues  Pt mentions mental health provider wanted to give trileptal but didn't "b/c of liver"  Prior suboxone use but stopped b/c "side effects"  8/2023 tox (CE, PCP note): neg alcohol, pos fentanyl    PMH, PSH, SOCIAL HX, FAMILY HX      Reviewed in Epic  Pertinent findings:  FAMILY HX: neg for liver diease  SOCIAL HX: resides locally. Mentions living w/ a son.  Alcohol - denies hx of heavy use  Drugs - yes, ongoing      ROS: as per HPI  (+) GERD      PHYSICAL EXAM:  Friendly White male, in no acute distress; alert and oriented to person, place and time  HEENT: Sclerae anicteric.   NECK: Supple  LUNGS: Normal respiratory effort.   ABDOMEN: Flat, soft, nontender. No ascites  SKIN: Warm and dry. No jaundice, No obvious rashes.   EXTREMITIES: No lower extremity edema  NEURO/PSYCH: Normal gate. Memory intact. Thought and speech pattern appropriate. Behavior normal. No depression or anxiety noted.    PERTINENT DIAGNOSTIC RESULTS      Lab Results   Component Value Date    WBC 8.15 08/16/2023    HGB 15.2 08/16/2023     (L) 08/16/2023     Lab Results   Component Value Date    INR 1.1 08/16/2023     Lab Results   Component Value Date    AST 63 (H) 08/16/2023    ALT 34 08/16/2023    BILITOT 2.2 (H) 08/16/2023    ALBUMIN 3.5 08/16/2023    ALKPHOS 116 08/16/2023    CREATININE 0.7 08/16/2023    BUN <5 (L) 08/16/2023     08/16/2023    K 3.6 08/16/2023       US Abdomen Limited - 8/31/23  CLINICAL HISTORY:Splenomegaly, not elsewhere classified  TECHNIQUE:Limited ultrasound of the right upper quadrant of the abdomen (including pancreas, liver, gallbladder, common bile duct, and spleen) was performed.  COMPARISON:08/16/2023    FINDINGS:  Liver: Normal in size, measuring 16 cm. Homogeneous echotexture with lobular contour.  No focal " hepatic lesions.  Gallbladder: No calculi, wall thickening, or pericholecystic fluid.  No sonographic Laws's sign.  Biliary system: The common duct is not dilated, measuring 5 mm.  No intrahepatic ductal dilatation.  Spleen: Normal in size and echotexture, measuring 16.1 cm.  Miscellaneous: No upper abdominal ascites.    Impression  Sequela of cirrhosis and portal hypertension.  No focal liver lesion or biliary dilation.    CT ABDOMEN PELVIS WITH CONTRAST - 8/16/23   CLINICAL HISTORY:48 years  Male;  Lymphadenopathy, groin; Abdominal distention   TECHNIQUE:CT of the abdomen and pelvis with intravenous contrast.. Oral contrastWas not used.  All CT scans at this facility use dose modulation, iterative reconstruction, and/or weight based dosing when appropriate to reduce radiation dose to as low as reasonably achievable.     This exam was performed according to our department optimization program which includes automated exposure control, adjustment of the mA and/or kv according to patient size and/or use of iterative reconstruction technique.   COMPARISON: None.     FINDINGS:  Lower chest: A small to moderate left pleural effusion is noted. Heart size is normal. There is compressive volume loss in the left lower lobe. There are prominent enhancing vessels seen adjacent to the distal esophagus suggesting varices.     Abdomen:  Liver and biliary tree: There is subtle fatty infiltration of the liver and the liver has a nodular appearance. Portal vein is patent. Hepatic veins are not yet opacified. Gallbladder is distended. No definitive biliary dilatation.  Pancreas: Within normal limits  Spleen: The spleen is enlarged measuring 16.7 cm in length.  Kidneys:  Kidneys are normal in size, shape and position.  No stones.  No mass or hydronephrosis. Symmetric renal enhancement. No perinephric abnormality.  Adrenal glands:Within normal limits  Vascular structures: Splenic vein is dilated. There are tortuous vessels seen  "adjacent to the gastric fundus and the distal esophagus suggesting varices. There is minimal calcified plaque in the aorta. No aneurysm.  Retroperitoneum:  No mass or lymphadenopathy  Abdominal wall:  normal  GI:Bowel is of normal caliber.  No focal bowel wall thickening.  No obstruction.   Appendix: The appendix appears normal.  General:  No free air.  No free fluid     Pelvis:  Lymph nodes:  In the proximal thigh lymph nodes are seen which measure up to 3.2 cm. Lymph nodes are present in both inguinal regions. Etiology is unknown.  Bladder: Urinary bladder is distended.  Pelvis: No pelvic mass or adenopathy.  Bones: No acute bone findings.     IMPRESSION:  1.  Small to moderate left pleural effusion with compressive volume loss in the left lower lobe.  2.  Changes of cirrhosis and portal hypertension with splenomegaly and varices.  3.  Bilateral inguinal and proximal thigh lymphadenopathy of unknown etiology.        ASSESSMENT        48 y.o. White male with:  1. CHRONIC HEPATITIS C, GENOTYPE  ?  - treatment naive  -- Elevated transaminases  -- HAV status - Unknown  -- HBV status - Unknown    2. DECOMPENSATED CIRRHOSIS  -- MELD score - 11  -- HCC screening - Up to date    3. PORTAL HYPERTENSION  -- EGD needed  -- low plt  -- SM, varices on imaging    4. SUBSTANCE USE D/O  -- ongoing  -- describes "side effects" w/ suboxone in past    5. BIPOLAR D/O      PLAN        1. Labs today  Orders Placed This Encounter   Procedures    HIV 1/2 Ag/Ab (4th Gen)    Hepatitis B Surface Ab, Qualitative    Hepatitis B Core Antibody, Total    Hepatitis A antibody, IgG    AFP Tumor Marker    Phosphatidylethanol (PETH)    Comprehensive Metabolic Panel    Protime-INR       2. Obtain authorization to treat HCV with Epclusa x 24 weeks  -- Rx will be routed to [facility]  -- Patient will notify me of exact treatment start date so appropriate lab f/u can be scheduled.  -- 24 weeks recommended due to decompensated cirrhosis. "   -- Ribavirin not recommended due to concerns regarding adherence w/ lab monitoring    3. F/u visit in 1 month. Will discuss cirrhosis in more detail at this visit.     Discussed evidence of cirrhosis and need for long term liver care.   Discussed that MELD does not indicate need for transplant and that transplant could not be considered if still using drugs.  Encouraged him to pursue efforts to stop using  Discussed goal of HCV cure to minimize progression of liver disease.    Discussed that liver disease (nor upcoming HCV rx) preclude trileptal if psych feels this would be helpful.  Prior studies indicated concern for decreased HCV cure if trileptal used while on epclusa, but more recent studies have suggested cure rate may not be compromised as much as originally thought. If trileptal recommended, benefits felt to outweigh risks given multiple mental health issues and drug use.      ___________________________________________________________________  EDUCATION:  The natural history of Hepatitis C, including potential progression to cirrhosis was reviewed. We discussed the increased progression of liver disease secondary to alcohol use; patient was advised to avoid alcohol completely.     Transmission of Hepatitis C was reviewed, including possible sexual transmission. Sexual contacts should be screened. Patient should avoid sharing personal products such as razors, toothbrushes, etc.       HCV RX  Discussed goal of HCV eradication to prevent progression of liver disease.  Discussed use of Epclusa daily x 24 weeks w/ potential side effects of fatigue and headache.     Reviewed limitations on acid suppressant medications due to DDI w/ Epclusa:  -- Antacids - TUMS must be  from Epclusa by 4 hours  -- H2 Receptor Antagonist - not taking  -- PPI - not recommended while on Epclusa  Patient instructed to contact me if experiencing acid related symptoms so further recommendations can be made regarding acid  suppression therapy.      Herbal / alternative therapies must be discontinued  Discussed importance of medication adherence and risk of treatment failure / viral resistance if not adherent. Pt has verbalized understanding.    __________________________________________________________________    Duration of encounter: 65 min  This includes face-to-face time and non face-to-face time preparing to see the patient (eg, review of tests), obtaining and/or reviewing separately obtained history, documenting clinical information in the electronic or other health record, independently interpreting resultsand communicating results to the patient/family/caregiver, or care coordination.

## 2023-10-01 LAB
CLINICAL BIOCHEMIST REVIEW: NORMAL
PLPETH BLD-MCNC: <10 NG/ML
POPETH BLD-MCNC: <10 NG/ML

## 2023-10-03 ENCOUNTER — TELEPHONE (OUTPATIENT)
Dept: HEPATOLOGY | Facility: CLINIC | Age: 48
End: 2023-10-03
Payer: MEDICAID

## 2023-10-03 DIAGNOSIS — B18.2 CHRONIC HEPATITIS C WITHOUT HEPATIC COMA: Primary | ICD-10-CM

## 2023-10-03 NOTE — TELEPHONE ENCOUNTER
Pt beginning 24 weeks Epclusa on 10/6/24  Anticipated treatment end date: 3/21/24  F 4 decompensated  Malorie ?  Prior HCV treatment: No      Pls update episode and schedule:  - keep appt w/ me later this month  - LFT, HCV RNA at week 6 -   - HCV RNA - SVR12 - 6/21/24

## 2023-10-03 NOTE — TELEPHONE ENCOUNTER
I spoke with patient and msg from PA Scheuermann relayed and mailed to him.  Lab draws scheduled 11/16/23 and 6/21/24; appt reminder notices mailed.

## 2023-10-23 ENCOUNTER — TELEPHONE (OUTPATIENT)
Dept: HEPATOLOGY | Facility: CLINIC | Age: 48
End: 2023-10-23
Payer: MEDICAID

## 2023-10-23 NOTE — TELEPHONE ENCOUNTER
----- Message from Katie Baig sent at 10/23/2023  9:20 AM CDT -----  Regarding: Reschedule Appt  Contact: Pt  Pt is requesting a callback  regarding appt scheduled tomorrow 10/24. Pt would like to reschedule due to car issues. Please adv pt      Confirmed contact below:   Contact Name:Christiano Gomez  Phone Number: [phone]  or [phone]

## 2023-10-23 NOTE — TELEPHONE ENCOUNTER
I spoke with patient.  Appt with PA Scheuermann scheduled again on 11/14/23.; reminder notice mailed.

## 2023-11-14 ENCOUNTER — LAB VISIT (OUTPATIENT)
Dept: LAB | Facility: HOSPITAL | Age: 48
End: 2023-11-14
Payer: MEDICAID

## 2023-11-14 ENCOUNTER — OFFICE VISIT (OUTPATIENT)
Dept: HEPATOLOGY | Facility: CLINIC | Age: 48
End: 2023-11-14
Payer: MEDICAID

## 2023-11-14 VITALS — WEIGHT: 198.44 LBS | HEIGHT: 68 IN | BODY MASS INDEX: 30.07 KG/M2

## 2023-11-14 DIAGNOSIS — K74.60 HEPATIC CIRRHOSIS, UNSPECIFIED HEPATIC CIRRHOSIS TYPE, UNSPECIFIED WHETHER ASCITES PRESENT: ICD-10-CM

## 2023-11-14 DIAGNOSIS — K76.6 PORTAL VENOUS HYPERTENSION: ICD-10-CM

## 2023-11-14 DIAGNOSIS — K74.60 HEPATIC CIRRHOSIS, UNSPECIFIED HEPATIC CIRRHOSIS TYPE, UNSPECIFIED WHETHER ASCITES PRESENT: Primary | ICD-10-CM

## 2023-11-14 DIAGNOSIS — B18.2 CHRONIC HEPATITIS C WITHOUT HEPATIC COMA: ICD-10-CM

## 2023-11-14 DIAGNOSIS — D69.6 THROMBOCYTOPENIA: ICD-10-CM

## 2023-11-14 DIAGNOSIS — F19.20: ICD-10-CM

## 2023-11-14 LAB
ALBUMIN SERPL BCP-MCNC: 3.3 G/DL (ref 3.5–5.2)
ALP SERPL-CCNC: 100 U/L (ref 55–135)
ALT SERPL W/O P-5'-P-CCNC: 11 U/L (ref 10–44)
ANION GAP SERPL CALC-SCNC: 7 MMOL/L (ref 8–16)
AST SERPL-CCNC: 26 U/L (ref 10–40)
BILIRUB DIRECT SERPL-MCNC: 0.8 MG/DL (ref 0.1–0.3)
BILIRUB SERPL-MCNC: 1.8 MG/DL (ref 0.1–1)
BUN SERPL-MCNC: 3 MG/DL (ref 6–20)
CALCIUM SERPL-MCNC: 8.7 MG/DL (ref 8.7–10.5)
CHLORIDE SERPL-SCNC: 106 MMOL/L (ref 95–110)
CO2 SERPL-SCNC: 25 MMOL/L (ref 23–29)
CREAT SERPL-MCNC: 0.8 MG/DL (ref 0.5–1.4)
EST. GFR  (NO RACE VARIABLE): >60 ML/MIN/1.73 M^2
GLUCOSE SERPL-MCNC: 101 MG/DL (ref 70–110)
POTASSIUM SERPL-SCNC: 3.5 MMOL/L (ref 3.5–5.1)
PROT SERPL-MCNC: 7 G/DL (ref 6–8.4)
SODIUM SERPL-SCNC: 138 MMOL/L (ref 136–145)

## 2023-11-14 PROCEDURE — 36415 COLL VENOUS BLD VENIPUNCTURE: CPT | Mod: PO | Performed by: PHYSICIAN ASSISTANT

## 2023-11-14 PROCEDURE — 99215 PR OFFICE/OUTPT VISIT, EST, LEVL V, 40-54 MIN: ICD-10-PCS | Mod: S$PBB,,, | Performed by: PHYSICIAN ASSISTANT

## 2023-11-14 PROCEDURE — 1160F PR REVIEW ALL MEDS BY PRESCRIBER/CLIN PHARMACIST DOCUMENTED: ICD-10-PCS | Mod: CPTII,,, | Performed by: PHYSICIAN ASSISTANT

## 2023-11-14 PROCEDURE — 87522 HEPATITIS C REVRS TRNSCRPJ: CPT | Performed by: PHYSICIAN ASSISTANT

## 2023-11-14 PROCEDURE — 1159F MED LIST DOCD IN RCRD: CPT | Mod: CPTII,,, | Performed by: PHYSICIAN ASSISTANT

## 2023-11-14 PROCEDURE — 99999 PR PBB SHADOW E&M-EST. PATIENT-LVL III: ICD-10-PCS | Mod: PBBFAC,,, | Performed by: PHYSICIAN ASSISTANT

## 2023-11-14 PROCEDURE — 99215 OFFICE O/P EST HI 40 MIN: CPT | Mod: S$PBB,,, | Performed by: PHYSICIAN ASSISTANT

## 2023-11-14 PROCEDURE — 3008F BODY MASS INDEX DOCD: CPT | Mod: CPTII,,, | Performed by: PHYSICIAN ASSISTANT

## 2023-11-14 PROCEDURE — 80048 BASIC METABOLIC PNL TOTAL CA: CPT | Performed by: PHYSICIAN ASSISTANT

## 2023-11-14 PROCEDURE — 3008F PR BODY MASS INDEX (BMI) DOCUMENTED: ICD-10-PCS | Mod: CPTII,,, | Performed by: PHYSICIAN ASSISTANT

## 2023-11-14 PROCEDURE — 99213 OFFICE O/P EST LOW 20 MIN: CPT | Mod: PBBFAC,PO | Performed by: PHYSICIAN ASSISTANT

## 2023-11-14 PROCEDURE — 80076 HEPATIC FUNCTION PANEL: CPT | Performed by: PHYSICIAN ASSISTANT

## 2023-11-14 PROCEDURE — 99999 PR PBB SHADOW E&M-EST. PATIENT-LVL III: CPT | Mod: PBBFAC,,, | Performed by: PHYSICIAN ASSISTANT

## 2023-11-14 PROCEDURE — 1160F RVW MEDS BY RX/DR IN RCRD: CPT | Mod: CPTII,,, | Performed by: PHYSICIAN ASSISTANT

## 2023-11-14 PROCEDURE — 1159F PR MEDICATION LIST DOCUMENTED IN MEDICAL RECORD: ICD-10-PCS | Mod: CPTII,,, | Performed by: PHYSICIAN ASSISTANT

## 2023-11-14 RX ORDER — CLONIDINE HYDROCHLORIDE 0.1 MG/1
0.1 TABLET ORAL NIGHTLY
COMMUNITY
Start: 2023-10-16

## 2023-11-14 RX ORDER — NALOXONE HYDROCHLORIDE 4 MG/.1ML
SPRAY NASAL
COMMUNITY
Start: 2023-09-19

## 2023-11-14 NOTE — PROGRESS NOTES
"HEPATOLOGY CLINIC VISIT NOTE - HCV clinic  CHIEF COMPLAINT: Hepatitis C   (accompanied by: father)    HISTORY       This is a 48 y.o. White male with substance use d/o (ongoing) and bipolar d/o, here for f/u regarding his decompensated HCV Cirrhosis.    Interval history:  Began 24 weeks epclusa on 10/6/24   Virologic response: RNA pending, drawn today   Adherence issues: no   Tolerability issues: no    Labs after last visit:  Peth neg  AFP normal  Lacking HAV, HBV immunity  HIV neg    Current symptoms of hepatic decompensation:  Ascites - no  CHRISTEL - yes, to knees   Diuretic use - lasix 20 + KCl 10  TBili elevation - yes, 1.9  HE / confusion / memory problems - no  EV bleed / hematemesis / melena - no    Still using drugs but "least possible amount" b/c he's worried about liver  Has tried suboxone but stopped b/c made him feel poorly  Follows w/ psych for bipolar. Notes trileptal stopped b/c of liver disease    HCV history:  Originally diagnosed years ago but not treated  - Prior Treatment: None prior to epclusa  - Genotype ?    Cirrhosis history:  FibroSURE 9/1/23 F4 (CE) - supported by labs / imaging  Decompensated: TBili elevation, fluid retention (ER visits 7/2023, 8/2023)  Evidence of portal HTN: SM and varices on imaging, low plt 110s-130s    MELD 3.0: 11 at 9/26/2023 11:26 AM  MELD-Na: 11 at 9/26/2023 11:26 AM  Calculated from:  Serum Creatinine: 0.8 mg/dL (Using min of 1 mg/dL) at 9/26/2023 11:26 AM  Serum Sodium: 139 mmol/L (Using max of 137 mmol/L) at 9/26/2023 11:26 AM  Total Bilirubin: 1.9 mg/dL at 9/26/2023 11:26 AM  Serum Albumin: 3.4 g/dL at 9/26/2023 11:26 AM  INR(ratio): 1.2 at 9/26/2023 11:26 AM  Age at listing (hypothetical): 48 years  Sex: Male at 9/26/2023 11:26 AM    Cirrhosis health maintenance:  - HCC screening - 8/2023 U/S & CT w/ no liver lesions; 9/2023 AFP normal  - Varices screening -  EGD needed  - HAV status - lacking immunity  - HBV status - lacking immunity, HBsAg neg " "7/2023    Substance abuse / mental health:  Chart review indicates bipolar d/o & prior PEC 12/2022 due to aggression and being danger to others, med adherence issues  Prior suboxone use but stopped b/c "side effects"  8/2023 tox (CE, PCP note): neg alcohol, pos fentanyl    PMH, PSH, SOCIAL HX, FAMILY HX      Reviewed in Epic  Pertinent findings:  FAMILY HX: neg for liver diease  SOCIAL HX: resides locally. Mentions living w/ a son.  Alcohol - denies hx of heavy use  Drugs - yes, ongoing      ROS: as per HPI      PHYSICAL EXAM:  White male, in no acute distress; alert and oriented to person, place and time  NECK: Supple  LUNGS: Normal respiratory effort.   ABDOMEN: Flat, soft, nontender. No ascites  SKIN: Warm and dry. No jaundice, No obvious rashes.   EXTREMITIES: (+) lower extremity edema - 1+ pitting to knees  NEURO/PSYCH: Normal gate. Memory intact. Thought and speech pattern appropriate. Behavior normal. Affect flat    PERTINENT DIAGNOSTIC RESULTS      Lab Results   Component Value Date    WBC 8.15 08/16/2023    HGB 15.2 08/16/2023     (L) 08/16/2023     Lab Results   Component Value Date    INR 1.2 09/26/2023     Lab Results   Component Value Date     (H) 09/26/2023    ALT 84 (H) 09/26/2023    BILITOT 1.9 (H) 09/26/2023    ALBUMIN 3.4 (L) 09/26/2023    ALKPHOS 113 09/26/2023    CREATININE 0.8 09/26/2023    BUN 3 (L) 09/26/2023     09/26/2023    K 3.6 09/26/2023    AFP 8.2 09/26/2023       US Abdomen Limited - 8/31/23  CLINICAL HISTORY:Splenomegaly, not elsewhere classified  TECHNIQUE:Limited ultrasound of the right upper quadrant of the abdomen (including pancreas, liver, gallbladder, common bile duct, and spleen) was performed.  COMPARISON:08/16/2023    FINDINGS:  Liver: Normal in size, measuring 16 cm. Homogeneous echotexture with lobular contour.  No focal hepatic lesions.  Gallbladder: No calculi, wall thickening, or pericholecystic fluid.  No sonographic Laws's sign.  Biliary system: " The common duct is not dilated, measuring 5 mm.  No intrahepatic ductal dilatation.  Spleen: Normal in size and echotexture, measuring 16.1 cm.  Miscellaneous: No upper abdominal ascites.    Impression  Sequela of cirrhosis and portal hypertension.  No focal liver lesion or biliary dilation.    CT ABDOMEN PELVIS WITH CONTRAST - 8/16/23   CLINICAL HISTORY:48 years  Male;  Lymphadenopathy, groin; Abdominal distention   TECHNIQUE:CT of the abdomen and pelvis with intravenous contrast.. Oral contrastWas not used.  All CT scans at this facility use dose modulation, iterative reconstruction, and/or weight based dosing when appropriate to reduce radiation dose to as low as reasonably achievable.     This exam was performed according to our department optimization program which includes automated exposure control, adjustment of the mA and/or kv according to patient size and/or use of iterative reconstruction technique.   COMPARISON: None.     FINDINGS:  Lower chest: A small to moderate left pleural effusion is noted. Heart size is normal. There is compressive volume loss in the left lower lobe. There are prominent enhancing vessels seen adjacent to the distal esophagus suggesting varices.     Abdomen:  Liver and biliary tree: There is subtle fatty infiltration of the liver and the liver has a nodular appearance. Portal vein is patent. Hepatic veins are not yet opacified. Gallbladder is distended. No definitive biliary dilatation.  Pancreas: Within normal limits  Spleen: The spleen is enlarged measuring 16.7 cm in length.  Kidneys:  Kidneys are normal in size, shape and position.  No stones.  No mass or hydronephrosis. Symmetric renal enhancement. No perinephric abnormality.  Adrenal glands:Within normal limits  Vascular structures: Splenic vein is dilated. There are tortuous vessels seen adjacent to the gastric fundus and the distal esophagus suggesting varices. There is minimal calcified plaque in the aorta. No  "aneurysm.  Retroperitoneum:  No mass or lymphadenopathy  Abdominal wall:  normal  GI:Bowel is of normal caliber.  No focal bowel wall thickening.  No obstruction.   Appendix: The appendix appears normal.  General:  No free air.  No free fluid     Pelvis:  Lymph nodes:  In the proximal thigh lymph nodes are seen which measure up to 3.2 cm. Lymph nodes are present in both inguinal regions. Etiology is unknown.  Bladder: Urinary bladder is distended.  Pelvis: No pelvic mass or adenopathy.  Bones: No acute bone findings.     IMPRESSION:  1.  Small to moderate left pleural effusion with compressive volume loss in the left lower lobe.  2.  Changes of cirrhosis and portal hypertension with splenomegaly and varices.  3.  Bilateral inguinal and proximal thigh lymphadenopathy of unknown etiology.        ASSESSMENT        48 y.o. White male with:  1. CHRONIC HEPATITIS C, GENOTYPE  ?  - on epclusa  -- Elevated transaminases  -- HAV status - lacking immunity  -- HBV status - lacking immunity    2. DECOMPENSATED CIRRHOSIS  -- MELD score - 11  -- HCC screening - Up to date    3. PORTAL HYPERTENSION  -- EGD needed  -- low plt  -- SM, varices on imaging    4. SUBSTANCE USE D/O  -- ongoing  -- describes "side effects" w/ suboxone in past    5. BIPOLAR D/O      PLAN        1. Await pending labs. Add BMP. Will increase diuretics (likely add josette) after lab review  2. Continue Epclusa x 24 weeks w/ labs for SVR  3. Twinrix sent to [facility]  4. HCC screening due 2/2024 w/ f/u visit afterwards  5. EGD - pt to schedule appt w/ Dr Guzman    ______________________________________________________________________________  EDUCATION:  CIRRHOSIS  Discussed evidence that indicates that pt has cirrhosis.   -- Discussed liver fibrosis/scarring and relation to liver function. Reviewed significance of MELD scoring system as it relates to indication for transplant.  -- Signs and symptoms of hepatic decompensation were reviewed, including jaundice, " ascites, and slowed mentation due to hepatic encephalopathy. The patient should seek medical attention if any of these things occur.   --  We discussed the potential for bleeding from esophageal varices with symptoms of hematemesis and melena. The patient should report to the Emergency Department for these symptoms.   -- We discussed the increased risk of hepatocellular carcinoma due to cirrhosis. Lifelong screening every six months with ultrasound and AFP is recommended.   -- Discussed portal hypertension, including potential development of esophageal varices. Role of EGD in screening for varices was reviewed.     -- Tylenol (acetaminophen) is okay up to 2,000mg daily  -- Avoid NSAIDs     Dietary recommendations:  -- Avoid alcohol  -- Avoid raw seafood  -- Limit Na to 2,000mg   __________________________________________________________________    Duration of encounter: 48 min  This includes face-to-face time and non face-to-face time preparing to see the patient (eg, review of tests), obtaining and/or reviewing separately obtained history, documenting clinical information in the electronic or other health record, independently interpreting resultsand communicating results to the patient/family/caregiver, or care coordination.

## 2023-11-14 NOTE — PATIENT INSTRUCTIONS
Continue epclusa (HCV Medicine) - 1 pill each day  After blood results from today come back I will give instructions on how to change fluid pills to help leg swelling  Get Twinrix (Hep A / Hep B vaccine) from [facility].   - One shot now. 2nd shot a month later.  Call Dr Guzman (GI) [phone].   - You need an appt with him so he can do an EGD (upper scope) to look for swollen vessels around your swallowing tube.   Next routine liver cancer screening tests (labs and ultrasound) due 2/2024

## 2023-11-15 LAB
HCV RNA SERPL QL NAA+PROBE: NOT DETECTED
HCV RNA SPEC NAA+PROBE-ACNC: NOT DETECTED IU/ML

## 2023-11-16 ENCOUNTER — TELEPHONE (OUTPATIENT)
Dept: HEPATOLOGY | Facility: CLINIC | Age: 48
End: 2023-11-16
Payer: MEDICAID

## 2023-11-16 DIAGNOSIS — K74.60 HEPATIC CIRRHOSIS, UNSPECIFIED HEPATIC CIRRHOSIS TYPE, UNSPECIFIED WHETHER ASCITES PRESENT: Primary | ICD-10-CM

## 2023-11-16 RX ORDER — SPIRONOLACTONE 50 MG/1
50 TABLET, FILM COATED ORAL DAILY
Qty: 30 TABLET | Refills: 1 | Status: SHIPPED | OUTPATIENT
Start: 2023-11-16 | End: 2024-03-19 | Stop reason: SDUPTHER

## 2023-11-16 NOTE — TELEPHONE ENCOUNTER
Pt began 24 weeks Epclusa on 10/6/24  Anticipated treatment end date: 3/21/24  F 4 decompensated  Malorie ?  Prior HCV treatment: No    11/14:  BMP stable - Na 138, K 3.5, Cr 0.8  LFT stable - enzymes now normal  HCV neg    Please tell pt:  Labs look good  Liver numbers are improving.  Hep C virus is no longer showing up in blood, but do not stop treatment yet!  It is very important to continue the full 24 weeks of Epclusa. Try not to miss any doses. Treatment should end in 3/2024. There is a small chance the virus can return during the 3 months after treatment ends. We will monitor blood for this.   Kidney labs and electrolytes are normal. We can increase fluid pills as follows:   Cont lasix / furosemide 20mg daily    Add spironolactone 50mg daily. Rx sent to [facility] in chart.   Cont current potassium pills but we may stop these after next labs  Very important to stay on low sodium diet - don't add salt to food, don't cook with salt, no canned foods, gatorade or sports drinks, pickles / pickle juice      Next labs   - BMP 1 week - pls schedule   - HCV RNA - SVR12 - 6/21/24

## 2023-11-16 NOTE — TELEPHONE ENCOUNTER
I spoke with patient and msg from PA Scheuermann relayed and mailed to him.  BMP scheduled 11/24/23; reminder notice mailed.

## 2023-11-24 ENCOUNTER — TELEPHONE (OUTPATIENT)
Dept: HEPATOLOGY | Facility: CLINIC | Age: 48
End: 2023-11-24
Payer: MEDICAID

## 2023-11-24 ENCOUNTER — LAB VISIT (OUTPATIENT)
Dept: LAB | Facility: HOSPITAL | Age: 48
End: 2023-11-24
Attending: PHYSICIAN ASSISTANT
Payer: MEDICAID

## 2023-11-24 DIAGNOSIS — K74.60 HEPATIC CIRRHOSIS, UNSPECIFIED HEPATIC CIRRHOSIS TYPE, UNSPECIFIED WHETHER ASCITES PRESENT: Primary | ICD-10-CM

## 2023-11-24 DIAGNOSIS — K74.60 HEPATIC CIRRHOSIS, UNSPECIFIED HEPATIC CIRRHOSIS TYPE, UNSPECIFIED WHETHER ASCITES PRESENT: ICD-10-CM

## 2023-11-24 LAB
ANION GAP SERPL CALC-SCNC: 8 MMOL/L (ref 8–16)
BUN SERPL-MCNC: 7 MG/DL (ref 6–20)
CALCIUM SERPL-MCNC: 8.7 MG/DL (ref 8.7–10.5)
CHLORIDE SERPL-SCNC: 103 MMOL/L (ref 95–110)
CO2 SERPL-SCNC: 22 MMOL/L (ref 23–29)
CREAT SERPL-MCNC: 0.8 MG/DL (ref 0.5–1.4)
EST. GFR  (NO RACE VARIABLE): >60 ML/MIN/1.73 M^2
GLUCOSE SERPL-MCNC: 91 MG/DL (ref 70–110)
POTASSIUM SERPL-SCNC: 3.9 MMOL/L (ref 3.5–5.1)
SODIUM SERPL-SCNC: 133 MMOL/L (ref 136–145)

## 2023-11-24 PROCEDURE — 36415 COLL VENOUS BLD VENIPUNCTURE: CPT | Performed by: PHYSICIAN ASSISTANT

## 2023-11-24 PROCEDURE — 80048 BASIC METABOLIC PNL TOTAL CA: CPT | Performed by: PHYSICIAN ASSISTANT

## 2023-11-24 NOTE — TELEPHONE ENCOUNTER
I spoke with patient and msg from PA Scheuermann relayed.  It was stressed that he stop K+.  Patient verbalized understanding of orders.  BMP scheduled 12/22/23; appt reminder notice mailed.

## 2023-11-24 NOTE — TELEPHONE ENCOUNTER
11/24:  BMP stable - Na 133, K 3.9, Cr 0.8, BUN 7    Please tell pt:  Kidney labs and electrolytes are stable.    Continue lasix / furosemide 20mg daily    Continue spironolactone 50mg daily.    Can stop potassium pill     Very important to stay on low sodium diet - don't add salt to food, don't cook with salt, no canned foods, gatorade or sports drinks, pickles / pickle juice      Next labs   - BMP 1 month - pls schedule   - Labs / U/S - 2/29/23  - Visit w/ me in [facility]  - HCV RNA - SVR12 - 6/21/24

## 2023-11-27 ENCOUNTER — TELEPHONE (OUTPATIENT)
Dept: HEPATOLOGY | Facility: CLINIC | Age: 48
End: 2023-11-27
Payer: MEDICAID

## 2023-12-06 ENCOUNTER — TELEPHONE (OUTPATIENT)
Dept: HEPATOLOGY | Facility: CLINIC | Age: 48
End: 2023-12-06
Payer: MEDICAID

## 2023-12-06 NOTE — TELEPHONE ENCOUNTER
Attempt made to reach patient. I spoke with is father.  He states that he will have patient to call us back when he wakes up.

## 2023-12-06 NOTE — TELEPHONE ENCOUNTER
----- Message from Barbara Hunt sent at 12/6/2023  8:52 AM CST -----  Regarding: Consult/Advisory  Contact: Christiano Gomez Jr.  Consult/Advisory    Name Of Caller: Christiano Gomez Jr.       Contact Preference: [phone] (Home Ph    Nature of call: Patient calling to speak to nurse regarding some issues he is having. No other details provided when asked. Requesting a call back.

## 2024-01-04 ENCOUNTER — LAB VISIT (OUTPATIENT)
Dept: LAB | Facility: HOSPITAL | Age: 49
End: 2024-01-04
Attending: PHYSICIAN ASSISTANT
Payer: MEDICAID

## 2024-01-04 ENCOUNTER — TELEPHONE (OUTPATIENT)
Dept: HEPATOLOGY | Facility: CLINIC | Age: 49
End: 2024-01-04
Payer: MEDICAID

## 2024-01-04 DIAGNOSIS — K74.60 HEPATIC CIRRHOSIS, UNSPECIFIED HEPATIC CIRRHOSIS TYPE, UNSPECIFIED WHETHER ASCITES PRESENT: ICD-10-CM

## 2024-01-04 LAB
ANION GAP SERPL CALC-SCNC: 7 MMOL/L (ref 8–16)
BUN SERPL-MCNC: 4 MG/DL (ref 6–20)
CALCIUM SERPL-MCNC: 8.8 MG/DL (ref 8.7–10.5)
CHLORIDE SERPL-SCNC: 99 MMOL/L (ref 95–110)
CO2 SERPL-SCNC: 25 MMOL/L (ref 23–29)
CREAT SERPL-MCNC: 0.7 MG/DL (ref 0.5–1.4)
EST. GFR  (NO RACE VARIABLE): >60 ML/MIN/1.73 M^2
GLUCOSE SERPL-MCNC: 102 MG/DL (ref 70–110)
POTASSIUM SERPL-SCNC: 4.5 MMOL/L (ref 3.5–5.1)
SODIUM SERPL-SCNC: 131 MMOL/L (ref 136–145)

## 2024-01-04 PROCEDURE — 36415 COLL VENOUS BLD VENIPUNCTURE: CPT | Performed by: PHYSICIAN ASSISTANT

## 2024-01-04 PROCEDURE — 80048 BASIC METABOLIC PNL TOTAL CA: CPT | Performed by: PHYSICIAN ASSISTANT

## 2024-01-04 NOTE — TELEPHONE ENCOUNTER
1/4/24  BMP stable - Na 131, K 4.5, Cr 0.7, BUN 4    Please tell pt:  Kidney labs and electrolytes are stable.    Continue lasix / furosemide 20mg daily    Continue spironolactone 50mg daily.    Remain off potassium pill     Very important to stay on low sodium diet - don't add salt to food, don't cook with salt, no canned foods, gatorade or sports drinks, pickles / pickle juice      Next labs   - Labs / U/S - 2/29/23  - Visit w/ me in March  - HCV RNA - SVR12 - 6/21/24

## 2024-01-05 ENCOUNTER — TELEPHONE (OUTPATIENT)
Dept: HEPATOLOGY | Facility: CLINIC | Age: 49
End: 2024-01-05
Payer: MEDICAID

## 2024-01-05 NOTE — TELEPHONE ENCOUNTER
I spoke with Estefani at [facility] and gave verbal Rx to her for patients Spironolactone.

## 2024-01-05 NOTE — TELEPHONE ENCOUNTER
I tried to refill electronically but don't see pharmacy in EPIC    Please call out: spironolactone 50mg PO daily, #30, 3 refills    thanks

## 2024-01-05 NOTE — TELEPHONE ENCOUNTER
----- Message from Felicia Jones sent at 1/5/2024 11:22 AM CST -----  Regarding: Rx refill  Contact: Christiano  Regarding: Rx refill        Name Of Caller: Gene        Contact Preference:    [phone] (home)        Nature of call:  pt is calling to have the following medication refilled, pt has about a week  of dosage left.        spironolactone (ALDACTONE) 50 MG tablet       Pharmacy:     [facility]  [place], [place]  [phone] ( fax)  [phone] ( phone)

## 2024-02-29 ENCOUNTER — TELEPHONE (OUTPATIENT)
Dept: HEPATOLOGY | Facility: CLINIC | Age: 49
End: 2024-02-29
Payer: MEDICAID

## 2024-02-29 NOTE — TELEPHONE ENCOUNTER
"----- Message from Darryl Rondon sent at 2/29/2024  9:19 AM CST -----  Reschedule Existing Appointment      Appt Date: 2/29    Type of appt: NFL; US ABD    Physician: Scheuermann    Reason for rescheduling? Transportation; Requesting to r/s for soonest available    Caller: Self    Contact Preference: [phone]          Additional Information:  "Thank you for all that you do for our patients"      "

## 2024-03-02 ENCOUNTER — LAB VISIT (OUTPATIENT)
Dept: LAB | Facility: HOSPITAL | Age: 49
End: 2024-03-02
Attending: PHYSICIAN ASSISTANT
Payer: MEDICAID

## 2024-03-02 ENCOUNTER — HOSPITAL ENCOUNTER (OUTPATIENT)
Dept: RADIOLOGY | Facility: HOSPITAL | Age: 49
Discharge: HOME | End: 2024-03-02
Attending: PHYSICIAN ASSISTANT
Payer: MEDICAID

## 2024-03-02 DIAGNOSIS — K74.60 HEPATIC CIRRHOSIS, UNSPECIFIED HEPATIC CIRRHOSIS TYPE, UNSPECIFIED WHETHER ASCITES PRESENT: ICD-10-CM

## 2024-03-02 LAB
ALBUMIN SERPL BCP-MCNC: 3.6 G/DL (ref 3.5–5.2)
ALP SERPL-CCNC: 75 U/L (ref 55–135)
ALT SERPL W/O P-5'-P-CCNC: 21 U/L (ref 10–44)
ANION GAP SERPL CALC-SCNC: 9 MMOL/L (ref 8–16)
AST SERPL-CCNC: 30 U/L (ref 10–40)
BILIRUB SERPL-MCNC: 1.8 MG/DL (ref 0.1–1)
BUN SERPL-MCNC: 6 MG/DL (ref 6–20)
CALCIUM SERPL-MCNC: 8.8 MG/DL (ref 8.7–10.5)
CHLORIDE SERPL-SCNC: 97 MMOL/L (ref 95–110)
CO2 SERPL-SCNC: 21 MMOL/L (ref 23–29)
CREAT SERPL-MCNC: 0.7 MG/DL (ref 0.5–1.4)
ERYTHROCYTE [DISTWIDTH] IN BLOOD BY AUTOMATED COUNT: 13 % (ref 11.5–14.5)
EST. GFR  (NO RACE VARIABLE): >60 ML/MIN/1.73 M^2
GLUCOSE SERPL-MCNC: 99 MG/DL (ref 70–110)
HCT VFR BLD AUTO: 37 % (ref 40–54)
HGB BLD-MCNC: 13.5 G/DL (ref 14–18)
INR PPP: 1.2 (ref 0.8–1.2)
MCH RBC QN AUTO: 31.6 PG (ref 27–31)
MCHC RBC AUTO-ENTMCNC: 36.5 G/DL (ref 32–36)
MCV RBC AUTO: 87 FL (ref 82–98)
PLATELET # BLD AUTO: 132 K/UL (ref 150–450)
PMV BLD AUTO: 9.1 FL (ref 9.2–12.9)
POTASSIUM SERPL-SCNC: 3.9 MMOL/L (ref 3.5–5.1)
PROT SERPL-MCNC: 7 G/DL (ref 6–8.4)
PROTHROMBIN TIME: 12.5 SEC (ref 9–12.5)
RBC # BLD AUTO: 4.27 M/UL (ref 4.6–6.2)
SODIUM SERPL-SCNC: 127 MMOL/L (ref 136–145)
WBC # BLD AUTO: 7.74 K/UL (ref 3.9–12.7)

## 2024-03-02 PROCEDURE — 76705 ECHO EXAM OF ABDOMEN: CPT | Mod: 26,,, | Performed by: RADIOLOGY

## 2024-03-02 PROCEDURE — 85027 COMPLETE CBC AUTOMATED: CPT | Performed by: PHYSICIAN ASSISTANT

## 2024-03-02 PROCEDURE — 82105 ALPHA-FETOPROTEIN SERUM: CPT | Performed by: PHYSICIAN ASSISTANT

## 2024-03-02 PROCEDURE — 85610 PROTHROMBIN TIME: CPT | Performed by: PHYSICIAN ASSISTANT

## 2024-03-02 PROCEDURE — 76705 ECHO EXAM OF ABDOMEN: CPT | Mod: TC

## 2024-03-02 PROCEDURE — 80053 COMPREHEN METABOLIC PANEL: CPT | Performed by: PHYSICIAN ASSISTANT

## 2024-03-04 ENCOUNTER — TELEPHONE (OUTPATIENT)
Dept: HEPATOLOGY | Facility: CLINIC | Age: 49
End: 2024-03-04
Payer: MEDICAID

## 2024-03-04 NOTE — TELEPHONE ENCOUNTER
Call placed to Christiano Palomino at [phone]. Message relayed from Jennifer Scheuermann PA.    Christiano Palomino stated he has SOB all the time.  Christy NICOLE advises for him to go to the ER.  Let is PCP know what is going on.  Please keep your  upcoming appt with Jennifer Scheuermann on 3/19/24 at 1 pm in [place].  Christiano Palomino verbalized understanding.    Results of US Abd, 3/4/23 and message to Patient routed to his PCP, Seble Castillo NP.

## 2024-03-04 NOTE — TELEPHONE ENCOUNTER
----- Message from Jennifer B. Scheuermann, PA sent at 3/3/2024  3:29 PM CST -----  Pls call pt - ok to speak to father.  His u/s of abdomen shows there is some fluid in his right lung. There is no fluid in his belly. I do not think this is related to his liver disease. If having shortness of breath or chest pain he should go to ER. Otherwise, he should see PCP. Please send copy of this to his PCP.  Keep upcoming appt w/ me in liver clinic later this month.  Thanks - Sofya

## 2024-03-05 LAB — AFP-TM SERPL-MCNC: 5.9 NG/ML

## 2024-03-19 ENCOUNTER — OFFICE VISIT (OUTPATIENT)
Dept: HEPATOLOGY | Facility: CLINIC | Age: 49
End: 2024-03-19
Payer: MEDICAID

## 2024-03-19 VITALS — WEIGHT: 201.5 LBS | BODY MASS INDEX: 30.54 KG/M2 | HEIGHT: 68 IN

## 2024-03-19 DIAGNOSIS — K74.60 HEPATIC CIRRHOSIS, UNSPECIFIED HEPATIC CIRRHOSIS TYPE, UNSPECIFIED WHETHER ASCITES PRESENT: Primary | ICD-10-CM

## 2024-03-19 DIAGNOSIS — B18.2 CHRONIC HEPATITIS C WITHOUT HEPATIC COMA: ICD-10-CM

## 2024-03-19 DIAGNOSIS — R60.0 BILATERAL LEG EDEMA: ICD-10-CM

## 2024-03-19 PROCEDURE — 1159F MED LIST DOCD IN RCRD: CPT | Mod: CPTII,,, | Performed by: PHYSICIAN ASSISTANT

## 2024-03-19 PROCEDURE — 99999 PR PBB SHADOW E&M-EST. PATIENT-LVL III: CPT | Mod: PBBFAC,,, | Performed by: PHYSICIAN ASSISTANT

## 2024-03-19 PROCEDURE — 3008F BODY MASS INDEX DOCD: CPT | Mod: CPTII,,, | Performed by: PHYSICIAN ASSISTANT

## 2024-03-19 PROCEDURE — 99213 OFFICE O/P EST LOW 20 MIN: CPT | Mod: PBBFAC,PN | Performed by: PHYSICIAN ASSISTANT

## 2024-03-19 PROCEDURE — 99215 OFFICE O/P EST HI 40 MIN: CPT | Mod: S$PBB,,, | Performed by: PHYSICIAN ASSISTANT

## 2024-03-19 PROCEDURE — 1160F RVW MEDS BY RX/DR IN RCRD: CPT | Mod: CPTII,,, | Performed by: PHYSICIAN ASSISTANT

## 2024-03-19 RX ORDER — SPIRONOLACTONE 50 MG/1
50 TABLET, FILM COATED ORAL DAILY
Qty: 30 TABLET | Refills: 5 | Status: SHIPPED | OUTPATIENT
Start: 2024-03-19

## 2024-03-19 RX ORDER — FUROSEMIDE 20 MG/1
20 TABLET ORAL DAILY
Qty: 30 TABLET | Refills: 5 | Status: SHIPPED | OUTPATIENT
Start: 2024-03-19 | End: 2025-03-19

## 2024-03-19 NOTE — PROGRESS NOTES
"HEPATOLOGY CLINIC VISIT NOTE - HCV clinic  CHIEF COMPLAINT: Hepatitis C, Cirrhosis     HISTORY       This is a 48 y.o. White male with substance use d/o (ongoing) and bipolar d/o, here for f/u regarding his decompensated HCV Cirrhosis.    HCV history:  Originally diagnosed years ago but not treated  - Prior Treatment: None prior to epclusa  - Genotype ?    Cirrhosis history:  FibroSURE 9/1/23 F4 (CE) - supported by labs / imaging  Decompensated: TBili elevation, fluid retention (ER visits 7/2023, 8/2023)  Evidence of portal HTN: SM and varices on imaging, low plt 110s-130s    Cirrhosis health maintenance:  - Varices screening -  EGD needed  - HAV status - lacking immunity  - HBV status - lacking immunity, HBsAg neg 7/2023    Substance abuse / mental health:  Chart review indicates bipolar d/o & prior PEC 12/2022 due to aggression and being danger to others, med adherence issues  Prior suboxone use but stopped b/c "side effects"  8/2023 tox (CE, PCP note): neg alcohol, pos fentanyl    Interval history (11/14/23):  Began 24 weeks epclusa on 10/6/24  (+) CHRISTEL to knees, on lasix 20 + KCl 10  TBili 1.9  No HE or EV bleed sx    Still using drugs but "least possible amount" b/c "worried about liver"  Tried suboxone - stopped b/c made him feel poorly  Sees psych for bipolar. Notes trileptal stopped b/c of liver disease    Interval history (3/19/24):  Will finish 24 weeks epclusa in few days  Wk 6: RNA neg & AST/ALT normal.  SVR labs scheduled    Routine labs / imaging 3/2/2024:  U/S: no liver lesions or ascites. SM 16cm. Rt pleural effusion (has PCP visit for this, denies dyspnea)  Labs: normal AFP, stable overall.     MELD 3.0: 17 at 3/2/2024  3:23 PM  MELD-Na: 11 at 3/2/2024  3:23 PM  Calculated from:  Serum Creatinine: 0.7 mg/dL (Using min of 1 mg/dL) at 3/2/2024  3:23 PM  Serum Sodium: 127 mmol/L at 3/2/2024  3:23 PM  Total Bilirubin: 1.8 mg/dL at 3/2/2024  3:23 PM  Serum Albumin: 3.6 g/dL (Using max of 3.5 g/dL) at " 3/2/2024  3:23 PM  INR(ratio): 1.2 at 3/2/2024  3:23 PM  Age at listing (hypothetical): 48 years  Sex: Male at 3/2/2024  3:23 PM    Current symptoms of hepatic decompensation:  Ascites - no  CHRISTEL - bilat lower leg but improved from last visit.   Diuretics: lasix 20 + josette 50. Not on low Na diet  TBili elevation - yes, 1.8  HE / confusion / memory problems - no  EV bleed / hematemesis / melena - no      PMH, PSH, SOCIAL HX, FAMILY HX      Reviewed in Epic  Pertinent findings:  FAMILY HX: neg for liver diease  SOCIAL HX: resides locally. Mentions living w/ a son.  Alcohol - denies hx of heavy use  Drugs - yes, ongoing    ROS: as per HPI    PHYSICAL EXAM:  White male, in no acute distress; alert and oriented to person, place and time  NECK: Supple  LUNGS: Normal respiratory effort.   ABDOMEN: Flat, soft, nontender. No ascites  SKIN: Warm and dry. No jaundice, No obvious rashes.   EXTREMITIES: (+) lower extremity edema - 1+ pitting to mid lower leg  NEURO/PSYCH: Normal gate. Memory intact. Thought and speech pattern appropriate. Behavior normal. Affect flat    PERTINENT DIAGNOSTIC RESULTS      Lab Results   Component Value Date    WBC 7.74 03/02/2024    HGB 13.5 (L) 03/02/2024     (L) 03/02/2024     Lab Results   Component Value Date    INR 1.2 03/02/2024     Lab Results   Component Value Date    AST 30 03/02/2024    ALT 21 03/02/2024    BILITOT 1.8 (H) 03/02/2024    ALBUMIN 3.6 03/02/2024    ALKPHOS 75 03/02/2024    CREATININE 0.7 03/02/2024    BUN 6 03/02/2024     (L) 03/02/2024    K 3.9 03/02/2024    AFP 5.9 03/02/2024     Results for orders placed during the hospital encounter of 03/02/24  US Abdomen Limited  CLINICAL HISTORY:Unspecified cirrhosis of liver  TECHNIQUE:Limited ultrasound of the right upper quadrant of the abdomen (including pancreas, liver, gallbladder, common bile duct, and spleen) was performed.  COMPARISON:Abdominal ultrasound 08/31/2023, CT abdomen and pelvis  "08/16/2023    FINDINGS:  Liver: Normal in size, measuring 15.9 cm. Diffusely nodular liver contour and coarse hepatic echotexture compatible with cirrhosis.  No focal hepatic lesions.  Gallbladder: Few nonshadowing echogenic foci within the gallbladder lumen which may reflect small stones and/or sludge.  There is no gallbladder wall thickening or pericholecystic fluid.  No sonographic Laws's sign.  Biliary system: The common duct is not dilated, measuring 6 mm.  No intrahepatic ductal dilatation.  Spleen: Enlarged, measuring 16.6 x 6.2 cm.  Miscellaneous: No upper abdominal ascites.  Right pleural effusion.    Impression  1. Cirrhotic liver morphology.  No focal hepatic lesion.  2. Cholelithiasis without sonographic evidence of acute cholecystitis.  3. Splenomegaly.  4. Right pleural effusion.    ASSESSMENT        48 y.o. White male with:  1. CHRONIC HEPATITIS C, GENOTYPE  ?  - on epclusa  -- Elevated transaminases  -- HAV status - lacking immunity - twinrix ordered last visit  -- HBV status - lacking immunity - twinrix ordered last visit    2. DECOMPENSATED CIRRHOSIS  -- MELD score - 17  -- HCC screening - Up to date 3/2024    3. PORTAL HYPERTENSION  -- EGD needed - previously advised to see Dr Guzman  -- low plt  -- SM, varices on imaging    4. SUBSTANCE USE D/O  -- ongoing  -- describes "side effects" w/ suboxone in past    5. BIPOLAR D/O      PLAN        1. Continue last few days of epclusa. Labs for SVR in 6/2024 as scheduled    2. Continue lasix 20 + josette 50. Low Na diet 2000mg reviewed in detail  Touch base in 1 month to assess CHRISTEL and see if diuretics need to be increased    3. CBC, CMP, INR, AFP, U/S, VISIT every 6 months: due 9/2024    ADDENDUM 4/16/24:  Spoke to pt. He's been on low Na diet and leg edema resolved  Will continue Lasix 20 + josette 50 and add BMP to labs in June.  Advised to call if fluid retention returns  __________________________________________________________________    Duration of " encounter: 41 min  This includes face-to-face time and non face-to-face time preparing to see the patient (eg, review of tests), obtaining and/or reviewing separately obtained history, documenting clinical information in the electronic or other health record, independently interpreting resultsand communicating results to the patient/family/caregiver, or care coordination.

## 2024-03-19 NOTE — PATIENT INSTRUCTIONS
Finish last few days of epclusa, labs to see if HCV is cured are due in 3 months  Continue lasix 20mg + spironolactone 50mg each day  Low sodium / low salt diet:  No more than 2000mg sodium each day  Read all food labels.  Avoid canned foods, deli meat, gatorade / sports drinks  Don't add salt or cook with salt    We'll talk in 1 month to see how leg swelling is doing.

## 2024-05-15 ENCOUNTER — TELEPHONE (OUTPATIENT)
Dept: HEPATOLOGY | Facility: CLINIC | Age: 49
End: 2024-05-15
Payer: MEDICAID

## 2024-05-15 NOTE — TELEPHONE ENCOUNTER
I spoke with patient.  He states that he is taking furosemide 20 mg and spironolactone 50 mg daily as ordered but is NOT following a low sodium diet.  He states that he feels very tired and is lying in bed a lot.  His legs and feet are swollen but he is able to wear his shoes.  He also has swelling underneath his eyes.   He bump his right leg on a piece of metal yesterday and cut it.  He reports having some blood loss that soaked through the band aid and dripped down his leg onto his sock and shoe which eventually stopped.    He does report feeling a little dizzy today but denied having SOB or chest pain.  He denied having abdominal swelling, N/V, yellowing of skin or eyes, confusion, coughing up blood, seeing blood in stool or passing black tarry stool. Please advise.

## 2024-05-15 NOTE — TELEPHONE ENCOUNTER
----- Message from Sapna Garcia RN sent at 5/15/2024 10:56 AM CDT -----  Regarding: FW: Questions    ----- Message -----  From: Cristine Michaels  Sent: 5/15/2024  10:33 AM CDT  To: [facility] Clinical Staff  Subject: Questions                                            Name Of Caller:   Gene      Contact Preference:   [phone]       Nature of call:  Pt states they're experiencing swelling in their legs. He has some questions. Please call back to assist.

## 2024-05-16 DIAGNOSIS — K74.60 HEPATIC CIRRHOSIS, UNSPECIFIED HEPATIC CIRRHOSIS TYPE, UNSPECIFIED WHETHER ASCITES PRESENT: Primary | ICD-10-CM

## 2024-05-16 NOTE — TELEPHONE ENCOUNTER
After last visit leg edema resolved w/ combo of diuretics and low Na diet. If not doing low Na diet very important that he start. Please review w/ him. Must read all food labels. Need to stay < 2,000mg each day.   No canned foods, sports drinks, canned foods, deli meats    Needs to see urgent care or PCP for cut on leg if not getting better.     Schedule visit w/ CBC, CMP, INR sometime w/in next few weeks

## 2024-05-16 NOTE — TELEPHONE ENCOUNTER
I spoke with patient and msg from PA Scheuermann relayed.  Lab draw and f/u appt scheduled 6/18/24; reminder notice mailed.  It was stressed that draw scheduled 6/21/24 was cancelled.

## 2024-06-18 ENCOUNTER — OFFICE VISIT (OUTPATIENT)
Dept: HEPATOLOGY | Facility: CLINIC | Age: 49
End: 2024-06-18
Payer: MEDICAID

## 2024-06-18 ENCOUNTER — LAB VISIT (OUTPATIENT)
Dept: LAB | Facility: HOSPITAL | Age: 49
End: 2024-06-18
Attending: PHYSICIAN ASSISTANT
Payer: MEDICAID

## 2024-06-18 VITALS — HEIGHT: 68 IN | WEIGHT: 203.25 LBS | BODY MASS INDEX: 30.8 KG/M2

## 2024-06-18 DIAGNOSIS — B18.2 CHRONIC HEPATITIS C WITHOUT HEPATIC COMA: ICD-10-CM

## 2024-06-18 DIAGNOSIS — K74.60 HEPATIC CIRRHOSIS, UNSPECIFIED HEPATIC CIRRHOSIS TYPE, UNSPECIFIED WHETHER ASCITES PRESENT: ICD-10-CM

## 2024-06-18 DIAGNOSIS — D69.6 THROMBOCYTOPENIA: ICD-10-CM

## 2024-06-18 DIAGNOSIS — K76.6 PORTAL VENOUS HYPERTENSION: ICD-10-CM

## 2024-06-18 DIAGNOSIS — K74.60 HEPATIC CIRRHOSIS, UNSPECIFIED HEPATIC CIRRHOSIS TYPE, UNSPECIFIED WHETHER ASCITES PRESENT: Primary | ICD-10-CM

## 2024-06-18 DIAGNOSIS — F19.20: ICD-10-CM

## 2024-06-18 LAB
ALBUMIN SERPL BCP-MCNC: 3.4 G/DL (ref 3.5–5.2)
ALP SERPL-CCNC: 79 U/L (ref 55–135)
ALT SERPL W/O P-5'-P-CCNC: 11 U/L (ref 10–44)
ANION GAP SERPL CALC-SCNC: 8 MMOL/L (ref 8–16)
AST SERPL-CCNC: 24 U/L (ref 10–40)
BASOPHILS # BLD AUTO: 0.04 K/UL (ref 0–0.2)
BASOPHILS NFR BLD: 0.5 % (ref 0–1.9)
BILIRUB SERPL-MCNC: 1.4 MG/DL (ref 0.1–1)
BUN SERPL-MCNC: 3 MG/DL (ref 6–20)
CALCIUM SERPL-MCNC: 9 MG/DL (ref 8.7–10.5)
CHLORIDE SERPL-SCNC: 99 MMOL/L (ref 95–110)
CO2 SERPL-SCNC: 22 MMOL/L (ref 23–29)
CREAT SERPL-MCNC: 0.8 MG/DL (ref 0.5–1.4)
DIFFERENTIAL METHOD BLD: ABNORMAL
EOSINOPHIL # BLD AUTO: 0.2 K/UL (ref 0–0.5)
EOSINOPHIL NFR BLD: 3.2 % (ref 0–8)
ERYTHROCYTE [DISTWIDTH] IN BLOOD BY AUTOMATED COUNT: 12.7 % (ref 11.5–14.5)
EST. GFR  (NO RACE VARIABLE): >60 ML/MIN/1.73 M^2
GLUCOSE SERPL-MCNC: 136 MG/DL (ref 70–110)
HCT VFR BLD AUTO: 38.2 % (ref 40–54)
HGB BLD-MCNC: 13.6 G/DL (ref 14–18)
IMM GRANULOCYTES # BLD AUTO: 0.03 K/UL (ref 0–0.04)
IMM GRANULOCYTES NFR BLD AUTO: 0.4 % (ref 0–0.5)
INR PPP: 1.2 (ref 0.8–1.2)
LYMPHOCYTES # BLD AUTO: 1 K/UL (ref 1–4.8)
LYMPHOCYTES NFR BLD: 13.1 % (ref 18–48)
MCH RBC QN AUTO: 32 PG (ref 27–31)
MCHC RBC AUTO-ENTMCNC: 35.6 G/DL (ref 32–36)
MCV RBC AUTO: 90 FL (ref 82–98)
MONOCYTES # BLD AUTO: 0.4 K/UL (ref 0.3–1)
MONOCYTES NFR BLD: 4.8 % (ref 4–15)
NEUTROPHILS # BLD AUTO: 5.8 K/UL (ref 1.8–7.7)
NEUTROPHILS NFR BLD: 78 % (ref 38–73)
NRBC BLD-RTO: 0 /100 WBC
PLATELET # BLD AUTO: 147 K/UL (ref 150–450)
PMV BLD AUTO: 9.4 FL (ref 9.2–12.9)
POTASSIUM SERPL-SCNC: 3.9 MMOL/L (ref 3.5–5.1)
PROT SERPL-MCNC: 7 G/DL (ref 6–8.4)
PROTHROMBIN TIME: 12.4 SEC (ref 9–12.5)
RBC # BLD AUTO: 4.25 M/UL (ref 4.6–6.2)
SODIUM SERPL-SCNC: 129 MMOL/L (ref 136–145)
WBC # BLD AUTO: 7.48 K/UL (ref 3.9–12.7)

## 2024-06-18 PROCEDURE — 36415 COLL VENOUS BLD VENIPUNCTURE: CPT | Performed by: PHYSICIAN ASSISTANT

## 2024-06-18 PROCEDURE — 87522 HEPATITIS C REVRS TRNSCRPJ: CPT | Performed by: PHYSICIAN ASSISTANT

## 2024-06-18 PROCEDURE — 1159F MED LIST DOCD IN RCRD: CPT | Mod: CPTII,,, | Performed by: PHYSICIAN ASSISTANT

## 2024-06-18 PROCEDURE — 80053 COMPREHEN METABOLIC PANEL: CPT | Performed by: PHYSICIAN ASSISTANT

## 2024-06-18 PROCEDURE — 99213 OFFICE O/P EST LOW 20 MIN: CPT | Mod: PBBFAC,PN | Performed by: PHYSICIAN ASSISTANT

## 2024-06-18 PROCEDURE — 85610 PROTHROMBIN TIME: CPT | Performed by: PHYSICIAN ASSISTANT

## 2024-06-18 PROCEDURE — 3008F BODY MASS INDEX DOCD: CPT | Mod: CPTII,,, | Performed by: PHYSICIAN ASSISTANT

## 2024-06-18 PROCEDURE — 99999 PR PBB SHADOW E&M-EST. PATIENT-LVL III: CPT | Mod: PBBFAC,,, | Performed by: PHYSICIAN ASSISTANT

## 2024-06-18 PROCEDURE — 99214 OFFICE O/P EST MOD 30 MIN: CPT | Mod: S$PBB,,, | Performed by: PHYSICIAN ASSISTANT

## 2024-06-18 PROCEDURE — 85025 COMPLETE CBC W/AUTO DIFF WBC: CPT | Performed by: PHYSICIAN ASSISTANT

## 2024-06-18 PROCEDURE — 1160F RVW MEDS BY RX/DR IN RCRD: CPT | Mod: CPTII,,, | Performed by: PHYSICIAN ASSISTANT

## 2024-06-18 RX ORDER — FLUOXETINE HYDROCHLORIDE 20 MG/1
20 CAPSULE ORAL DAILY
COMMUNITY
Start: 2024-06-11

## 2024-06-18 RX ORDER — BUDESONIDE AND FORMOTEROL FUMARATE DIHYDRATE 160; 4.5 UG/1; UG/1
2 AEROSOL RESPIRATORY (INHALATION) 2 TIMES DAILY
COMMUNITY
Start: 2024-03-25

## 2024-06-18 RX ORDER — ERGOCALCIFEROL 1.25 1/1
50000 CAPSULE ORAL
COMMUNITY
Start: 2024-05-16

## 2024-06-18 NOTE — PROGRESS NOTES
"HEPATOLOGY CLINIC VISIT NOTE - HCV clinic  CHIEF COMPLAINT: Hepatitis C, Cirrhosis     HISTORY       This is a 49 y.o. White male with substance use d/o (ongoing) and bipolar d/o, here for f/u regarding his decompensated HCV Cirrhosis.    HCV history:  Originally diagnosed years ago but not treated  - Prior Treatment: None prior to epclusa  - Genotype ?    Cirrhosis history:  FibroSURE 9/1/23 F4 (CE) - supported by labs / imaging  Decompensated: TBili elevation, fluid retention (ER visits 7/2023, 8/2023)  Evidence of portal HTN: SM and varices on imaging, low plt 110s-130s    Cirrhosis health maintenance:  - Varices screening -  EGD needed  - HAV status - lacking immunity  - HBV status - lacking immunity, HBsAg neg 7/2023    Substance abuse / mental health:  Chart review indicates bipolar d/o & PEC 12/2022 (aggression, danger to others, med adherence issues)  Prior suboxone use, stopped b/c "side effects"  8/2023 tox (CE, PCP note): neg alcohol, pos fentanyl    Interval history (11/14/23):  Began 24 weeks epclusa 10/6/24  Current liver sx:  (+) CHRISTEL to knees, on lasix 20 + KCl 10  TBili 1.9. No Ascites, HE, EV bleed    Still using drugs but "least possible amount" b/c "worried about liver"  Tried suboxone - stopped b/c caused side effects  Sees psych for bipolar. Notes trileptal stopped b/c of liver disease    Interval history (3/19/24):  Finishes 24wks epclusa in few days: Wk 6 RNA neg.   Labs / U/S 3/2024: MELD 17. Normal AFP. No liver lesions, ascites.   Rt pleural effusion (has PCP visit for this, denies dyspnea)    Current symptoms of hepatic decompensation:  (+) Bilat CHRISTEL, improved from last visit, on lasix 20 + josette 50. Not on low Na diet  TBili 1.8. No Ascites, HE, EV bleed    Interval history (6/18/24):  PC 4/2024: observing low Na diet; CHRISTEL resolved. Advised to cont lasix 20 + josette 50  PC 5/2024: pt c/o bilat CHRISTEL. On lasix 20 + josette 50 but not following low Na diet  Today states he sometimes feels " paranoid and calls b/c worried about swelling in legs  Here alone, son dropped him off. Dad having health problems    Labs today: CBC, CMP, INR, HCV (for SVR) - pending    Current liver sx:  (+) trace bilat CHRISTEL in lower legs. on lasix 20 + josette 50. Trying to follow low Na diet  TBili pending. No Ascites, HE, EV bleed    Started prozac few days ago for depression  No show to Dr Guzman x 2 so can no longer schedule appts there (still needs EGD)      PMH, PSH, SOCIAL HX, FAMILY HX      Reviewed in Epic  Pertinent findings:  FAMILY HX: neg for liver diease  SOCIAL HX: resides locally. Mentions living w/ a son.  Alcohol - denies hx of heavy use  Drugs - yes, ongoing    ROS: as per HPI    PHYSICAL EXAM:  White male, in no acute distress; alert and oriented to person, place and time  NECK: Supple  LUNGS: Normal respiratory effort.   ABDOMEN: Flat, soft, nontender. No ascites  SKIN: Warm and dry. No jaundice, No obvious rashes.   EXTREMITIES: (+) trace bilat lower extrem edema  NEURO/PSYCH: Normal gate. Memory intact. Thought and speech pattern appropriate. Behavior normal. Affect flat but more engaged than at prior visits    PERTINENT DIAGNOSTIC RESULTS      Lab Results   Component Value Date    WBC 7.74 03/02/2024    HGB 13.5 (L) 03/02/2024     (L) 03/02/2024     Lab Results   Component Value Date    INR 1.2 03/02/2024     Lab Results   Component Value Date    AST 30 03/02/2024    ALT 21 03/02/2024    BILITOT 1.8 (H) 03/02/2024    ALBUMIN 3.6 03/02/2024    ALKPHOS 75 03/02/2024    CREATININE 0.7 03/02/2024    BUN 6 03/02/2024     (L) 03/02/2024    K 3.9 03/02/2024    AFP 5.9 03/02/2024       ASSESSMENT        49 y.o. White male with:  1. Chronic HCV, s/p epclusa. SVR12 labs pending.  -- HAV status - lacking immunity - twinrix ordered  -- HBV status - lacking immunity - twinrix ordered    2. Cirrhosis, decompensated  -- MELD score - pending  -- HCC screening - Up to date 3/2024    3. Portal HTN  -- EGD  "needed - appears he can no longer go to Dr Guzman.  -- low plt  -- SM, varices on imaging    4. Substance use d/o  -- ongoing  -- describes "side effects" w/ suboxone in past      PLAN        1. Await pending labs  2. Continue lasix 20 + josette 50 w/ low Na diet  3. CBC, CMP, INR, AFP, U/S, VISIT every 6 months: due 9/2024, already scheduled  4. Twinrix again sent to [facility] to get 1st dose today  5. EGD for EV screening ordered     __________________________________________________________________    Duration of encounter: 32 min  This includes face-to-face time and non face-to-face time preparing to see the patient (eg, review of tests), obtaining and/or reviewing separately obtained history, documenting clinical information in the electronic or other health record, independently interpreting resultsand communicating results to the patient/family/caregiver, or care coordination.      "

## 2024-06-19 LAB — HCV RNA SERPL NAA+PROBE-ACNC: NORMAL IU/ML

## 2024-06-20 ENCOUNTER — TELEPHONE (OUTPATIENT)
Dept: HEPATOLOGY | Facility: CLINIC | Age: 49
End: 2024-06-20
Payer: MEDICAID

## 2024-06-20 DIAGNOSIS — Z86.19 HEPATITIS C VIRUS INFECTION CURED AFTER ANTIVIRAL DRUG THERAPY: Primary | ICD-10-CM

## 2024-06-20 PROBLEM — B18.2 CHRONIC HEPATITIS C: Status: RESOLVED | Noted: 2018-03-27 | Resolved: 2024-06-20

## 2024-06-20 NOTE — TELEPHONE ENCOUNTER
Pt began 24 weeks Epclusa on 10/6/24  Anticipated treatment end date: 3/21/24  F 4 decompensated  Malorie ?  Prior HCV treatment: No    6/18/24  CBC, CMP, INR stable  HCV neg    These labs document SVR12 following successful HCV treatment with Epclusa      please tell patient:  1.) Lab test shows there is NO Hepatitis C in the blood. This means the Hepatitis C is cured!!  We do not expect the virus to return.  This does not give protection from Hepatitis C and patient could be infected again if ever exposed to the virus again.    2.) Cirrhosis is still present and patient needs monitoring of liver and liver cancer screening every 6 months for the rest of their life. This is due again in 9/2024 (already scheduled)    3.) continue current fluid pills: lasix /furosemide 20mg & spironolactone 50mg       Please schedule   - HCV RNA - add to Sept labs

## 2024-06-21 DIAGNOSIS — Z86.19 HEPATITIS C VIRUS INFECTION CURED AFTER ANTIVIRAL DRUG THERAPY: Primary | ICD-10-CM

## 2024-07-02 ENCOUNTER — TELEPHONE (OUTPATIENT)
Dept: GASTROENTEROLOGY | Facility: CLINIC | Age: 49
End: 2024-07-02
Payer: MEDICAID

## 2024-07-02 NOTE — TELEPHONE ENCOUNTER
----- Message from Liz Castillo sent at 7/2/2024 11:51 AM CDT -----  Regarding: Referral Status  Type:  Needs Medical Advice    Who Called: Pt      Would the patient rather a call back or a response via [facility]? Call Back    Best Call Back Number: [phone]      Additional Information: checking on the status of their referral to be scheduled         Have a great day. Thank you!

## 2024-07-02 NOTE — TELEPHONE ENCOUNTER
EGD 9/19 at 10am arrive for 9am  instructions reviewed and patient states understanding. Copy mailed address verified no portal

## 2024-08-13 ENCOUNTER — TELEPHONE (OUTPATIENT)
Dept: HEPATOLOGY | Facility: CLINIC | Age: 49
End: 2024-08-13
Payer: MEDICAID

## 2024-08-13 NOTE — TELEPHONE ENCOUNTER
I spoke with patient.  He reports having lots of swelling in both feet.  Patient taking furosemide 20 mg and spironolactone 50 mg daily.  He denied having additional signs of liver decompensation, SOB or chest pain.  Patient states that he's not adding salt to food and is doing what he can to reduce sodium in diet.   Preferred pharmacy verified.

## 2024-08-13 NOTE — TELEPHONE ENCOUNTER
----- Message from Narendra Mondragon MA sent at 8/13/2024 10:47 AM CDT -----  Contact: [phone]  Pt called                    Pt is requesting a call back to speak with Ms Escudero as soon as possible in regards to both of pt feet's are swelling.

## 2024-08-13 NOTE — TELEPHONE ENCOUNTER
Spoke to pt  Has recently had boiled shrimp, popeyes, subway - all high sodium  Reports swelling limited to feet, not in ankles  Taking lasix 20 + josette 50    Advised to take extra dose of lasix and josette x 1d.  Re-educated on low Na diet  Keep feet elevated  Call in 1 week if still having swelling

## 2024-08-27 ENCOUNTER — TELEPHONE (OUTPATIENT)
Dept: HEPATOLOGY | Facility: CLINIC | Age: 49
End: 2024-08-27
Payer: MEDICAID

## 2024-08-27 NOTE — TELEPHONE ENCOUNTER
Pt is requesting a call back from Kena regarding her swelling in her legs and it is getting worst pls advise.    Call placed to the patient at [phone]. No Answer. Unable to leave VM message. Mail Box not set up yet.

## 2024-08-27 NOTE — TELEPHONE ENCOUNTER
----- Message from Lacie Albert MA sent at 8/27/2024  2:10 PM CDT -----  Regarding: FW: Advise  Contact: [phone]    ----- Message -----  From: Kenna Kendall  Sent: 8/27/2024   1:18 PM CDT  To: Scheuermann Jennifer B Staff  Subject: Advisjermaine Gomez calling regarding Patient Advice (message) for # pt is requesting a call back from Kena regarding her swelling in her legs and it is getting worst pls advise

## 2024-08-29 ENCOUNTER — TELEPHONE (OUTPATIENT)
Dept: HEPATOLOGY | Facility: CLINIC | Age: 49
End: 2024-08-29
Payer: MEDICAID

## 2024-08-29 NOTE — TELEPHONE ENCOUNTER
----- Message from Lacie Albert MA sent at 8/29/2024  9:42 AM CDT -----  Regarding: FW: plan of care  Contact: @ [phone]    ----- Message -----  From: Griselda Vera  Sent: 8/29/2024   9:32 AM CDT  To: Nenita Ramos RN; #  Subject: plan of care                                     Pt called in regards to speaking with someone about his plan of care he is dealing with a lot of  pain and swelling  at this time .....Please call and adv @ [phone]

## 2024-08-29 NOTE — TELEPHONE ENCOUNTER
Sent: 8/29/2024 9:32 AM CDT   Pt called in regards to speaking with someone about his plan of care he is dealing with a lot of pain and swelling at this time .....Please call and adv @ [phone] .      Call placed to the patient at [phone].  Phone rings. No Answer. VM box not set up yet.

## 2024-08-30 ENCOUNTER — TELEPHONE (OUTPATIENT)
Dept: HEPATOLOGY | Facility: CLINIC | Age: 49
End: 2024-08-30
Payer: MEDICAID

## 2024-08-30 NOTE — TELEPHONE ENCOUNTER
3rd attempt to reach the patient at [phone]. No Answer. VM box not set up yet.    Patient was called on 8/27, 8/29 and 8/30/24 regarding his leg pains and swelling. Unable to reach the Patient.    Will let Provider know.

## 2024-08-30 NOTE — TELEPHONE ENCOUNTER
----- Message from Lacie Albert MA sent at 8/30/2024  1:38 PM CDT -----    ----- Message -----  From: Addis Pepe  Sent: 8/30/2024   8:30 AM CDT  To: Scheuermann Jennifer B Staff    Same Day Appointment Request     Caller Is Requesting A Same Day Appointment      Caller Declined First Available Appointment? PT CALLED TO BE SEEN ASAP FOR LEG SWELLING. PLEASE GIVE PT A CALL TODAY. HE STATED HE'S LEFT MESSAGES AND NO ONE HAS REACHED OUT TO HIM YET PER PT     Best Call Back Number? [phone]    Additional Information:       Thank You

## 2024-09-12 ENCOUNTER — TELEPHONE (OUTPATIENT)
Dept: HEPATOLOGY | Facility: CLINIC | Age: 49
End: 2024-09-12
Payer: MEDICAID

## 2024-09-12 NOTE — TELEPHONE ENCOUNTER
----- Message from Griselda Vera sent at 8/29/2024  9:30 AM CDT -----  Regarding: plan of care  Contact: @ [phone]  Pt called in regards to speaking with someone about his plan of care he is dealing with a lot of  pain and swelling  at this time .....Please call and adv @ [phone]

## 2024-09-24 ENCOUNTER — HOSPITAL ENCOUNTER (OUTPATIENT)
Dept: RADIOLOGY | Facility: HOSPITAL | Age: 49
Discharge: HOME | End: 2024-09-24
Attending: PHYSICIAN ASSISTANT
Payer: MEDICAID

## 2024-09-24 ENCOUNTER — OFFICE VISIT (OUTPATIENT)
Dept: HEPATOLOGY | Facility: CLINIC | Age: 49
End: 2024-09-24
Payer: MEDICAID

## 2024-09-24 VITALS
BODY MASS INDEX: 35.03 KG/M2 | DIASTOLIC BLOOD PRESSURE: 85 MMHG | WEIGHT: 231.13 LBS | HEART RATE: 64 BPM | HEIGHT: 68 IN | SYSTOLIC BLOOD PRESSURE: 137 MMHG

## 2024-09-24 DIAGNOSIS — K74.60 HEPATIC CIRRHOSIS, UNSPECIFIED HEPATIC CIRRHOSIS TYPE, UNSPECIFIED WHETHER ASCITES PRESENT: Primary | ICD-10-CM

## 2024-09-24 DIAGNOSIS — K74.60 HEPATIC CIRRHOSIS, UNSPECIFIED HEPATIC CIRRHOSIS TYPE, UNSPECIFIED WHETHER ASCITES PRESENT: ICD-10-CM

## 2024-09-24 PROCEDURE — 99215 OFFICE O/P EST HI 40 MIN: CPT | Mod: S$PBB,,, | Performed by: PHYSICIAN ASSISTANT

## 2024-09-24 PROCEDURE — 99214 OFFICE O/P EST MOD 30 MIN: CPT | Mod: PBBFAC,PN | Performed by: PHYSICIAN ASSISTANT

## 2024-09-24 PROCEDURE — 3008F BODY MASS INDEX DOCD: CPT | Mod: CPTII,,, | Performed by: PHYSICIAN ASSISTANT

## 2024-09-24 PROCEDURE — 3075F SYST BP GE 130 - 139MM HG: CPT | Mod: CPTII,,, | Performed by: PHYSICIAN ASSISTANT

## 2024-09-24 PROCEDURE — 1160F RVW MEDS BY RX/DR IN RCRD: CPT | Mod: CPTII,,, | Performed by: PHYSICIAN ASSISTANT

## 2024-09-24 PROCEDURE — 1159F MED LIST DOCD IN RCRD: CPT | Mod: CPTII,,, | Performed by: PHYSICIAN ASSISTANT

## 2024-09-24 PROCEDURE — 99999 PR PBB SHADOW E&M-EST. PATIENT-LVL IV: CPT | Mod: PBBFAC,,, | Performed by: PHYSICIAN ASSISTANT

## 2024-09-24 PROCEDURE — 3079F DIAST BP 80-89 MM HG: CPT | Mod: CPTII,,, | Performed by: PHYSICIAN ASSISTANT

## 2024-09-24 PROCEDURE — 76705 ECHO EXAM OF ABDOMEN: CPT | Mod: 26,,, | Performed by: RADIOLOGY

## 2024-09-24 PROCEDURE — 76705 ECHO EXAM OF ABDOMEN: CPT | Mod: TC

## 2024-09-24 RX ORDER — SPIRONOLACTONE 50 MG/1
100 TABLET, FILM COATED ORAL DAILY
Qty: 60 TABLET | Refills: 5 | Status: SHIPPED | OUTPATIENT
Start: 2024-09-24

## 2024-09-24 RX ORDER — FUROSEMIDE 20 MG/1
40 TABLET ORAL DAILY
Qty: 60 TABLET | Refills: 5 | Status: SHIPPED | OUTPATIENT
Start: 2024-09-24 | End: 2025-09-24

## 2024-09-24 RX ORDER — CARVEDILOL 6.25 MG/1
6.25 TABLET ORAL 2 TIMES DAILY
Qty: 60 TABLET | Refills: 6 | Status: SHIPPED | OUTPATIENT
Start: 2024-09-24 | End: 2025-09-24

## 2024-09-24 NOTE — PATIENT INSTRUCTIONS
Increase lasix 20mg to TWO pills each morning  Increase spironolactone 50mg to TWO pills each morning  Blood work in one week.      Low sodium diet! No more than 2,000mg each day.  Check food labels  Don't cook with salt. Don't add salt to food.   No deli meats, canned goods, sports drinks, pickles / pickle juice

## 2024-09-24 NOTE — PROGRESS NOTES
"HEPATOLOGY CLINIC VISIT NOTE - HCV clinic  CHIEF COMPLAINT: Hepatitis C, Cirrhosis     HISTORY       This is a 49 y.o. White male with substance use d/o (ongoing) and bipolar d/o, here for f/u regarding his decompensated HCV Cirrhosis.    Cirrhosis history:  FibroSURE 9/1/23 F4 (CE) - supported by labs / imaging  Decompensated: TBili elevation, fluid retention (ER visits 7/2023, 8/2023)  Evidence of portal HTN: SM and varices on imaging, low plt 110s-130s    Cirrhosis health maintenance:  - Varices screening -  EGD needed  - HAV status - lacking immunity  - HBV status - lacking immunity, HBsAg neg 7/2023    HCV history:  Originally diagnosed years ago but not treated  - Prior Treatment: None prior to epclusa  - Genotype ?    Substance abuse / mental health:  Chart review indicates bipolar d/o & PEC 12/2022 (aggression, danger to others, med adherence issues)  Prior suboxone use, stopped b/c "side effects"  8/2023 tox (CE, PCP note): neg alcohol, pos fentanyl    Interval history (11/14/23):  Began 24 weeks epclusa 10/6/24  Current liver sx:  (+) CHRISTEL to knees, on lasix 20 + KCl 10  TBili 1.9. No Ascites, HE, EV bleed    Still using drugs but "least possible amount" b/c "worried about liver"  Tried suboxone - stopped b/c caused side effects  Sees psych for bipolar. Notes trileptal stopped b/c of liver disease    Interval history (3/19/24):  Finishes 24wks epclusa in few days: Wk 6 RNA neg.   Labs / U/S 3/2024: MELD 17. Normal AFP. No liver lesions, ascites.   Rt pleural effusion (has PCP visit for this, denies dyspnea)    Current symptoms of hepatic decompensation:  (+) Bilat CHRISTEL, improved from last visit, on lasix 20 + josette 50. Not on low Na diet  TBili 1.8. No Ascites, HE, EV bleed    Interval history (6/18/24):  PC 4/2024: on low Na diet; CHRISTEL resolved. Advised to cont lasix 20 + josette 50  PC 5/2024: c/o bilat CHRISTEL. On lasix 20 + josette 50; not following low Na diet    States he sometimes feels paranoid and calls b/c " "worried about swelling in legs  Here alone, son dropped him off. Dad having health problems  Labs today: CBC, CMP, INR, HCV (for SVR) - pending  (+) trace bilat CHRISTEL on lasix 20 + josette 50. Trying to follow low Na diet  TBili pending. No Ascites, HE, EV bleed  Started prozac few days ago for depression  No show to Dr Guzman x 2: can no longer schedule appts there (still needs EGD)    Interval history (9/24/24):  Had Twinrix #1: 6/18/24 at [facility] 6/2024 confirmed HCV now cured.     EGD scheduled 9/19/24: had to cancel b/c dad on hospice. Has phone # to r/s    Routine cirrhosis labs / imaging  U/S today: no liver lesions or ascites.   Labs today: stable. AFP pending    MELD 3.0: 15 at 9/24/2024 10:35 AM  MELD-Na: 11 at 9/24/2024 10:35 AM  Calculated from:  Serum Creatinine: 0.7 mg/dL (Using min of 1 mg/dL) at 9/24/2024 10:35 AM  Serum Sodium: 130 mmol/L at 9/24/2024 10:35 AM  Total Bilirubin: 1.8 mg/dL at 9/24/2024 10:35 AM  Serum Albumin: 3.5 g/dL at 9/24/2024 10:35 AM  INR(ratio): 1.2 at 9/24/2024 10:35 AM  Age at listing (hypothetical): 49 years  Sex: Male at 9/24/2024 10:35 AM    Current symptoms of hepatic decompensation:  Ascites - no  CHRISTEL - intermittent, significant bilat CHRISTEL to knees at present  Diuretic use - lasix 20 + josette 50 daily  Room for improvement w/ low Na diet: Ham sandwich 2x per week, mom cooks w/ "a little salt"  TBili elevation - 1.8  HE / confusion / memory problems - no  EV bleed / hematemesis / melena - no        PMH, PSH, SOCIAL HX, FAMILY HX      Reviewed in Epic  Pertinent findings:  FAMILY HX: neg for liver diease  SOCIAL HX: resides locally. Mentions living w/ a son.  Alcohol - denies hx of heavy use  Drugs - yes, ongoing    ROS: as per HPI    PHYSICAL EXAM:  White male, in no acute distress; alert and oriented to person, place and time  NECK: Supple  LUNGS: Normal respiratory effort.   ABDOMEN: Flat, soft, nontender. No ascites  SKIN: Warm and dry. No jaundice, No obvious " rashes.   EXTREMITIES: Bilat pitting edema to knees  NEURO/PSYCH: Normal gate. Memory intact. Thought and speech pattern appropriate. Behavior normal. Affect flat but appropriately interactive    PERTINENT DIAGNOSTIC RESULTS      Lab Results   Component Value Date    WBC 7.61 09/24/2024    HGB 13.3 (L) 09/24/2024     (L) 09/24/2024     Lab Results   Component Value Date    INR 1.2 09/24/2024     Lab Results   Component Value Date    AST 28 09/24/2024    ALT 13 09/24/2024    BILITOT 1.8 (H) 09/24/2024    ALBUMIN 3.5 09/24/2024    ALKPHOS 91 09/24/2024    CREATININE 0.7 09/24/2024    BUN 5 (L) 09/24/2024     (L) 09/24/2024    K 4.1 09/24/2024    AFP 5.9 03/02/2024     Results for orders placed during the hospital encounter of 09/24/24  US Abdomen Limited  CLINICAL HISTORY:Unspecified cirrhosis of liver  TECHNIQUE:Limited ultrasound of the right upper quadrant of the abdomen (including pancreas, liver, gallbladder, common bile duct, and right kidney) was performed.  COMPARISON:None.    FINDINGS:  The pancreas is obscured.  A few gallstones are present measuring up to 9 mm.  Common bile duct is normal caliber at 6 mm.  The liver is normal size at 16.5 cm, demonstrate a nodular contour and coarsened echotexture in keeping with cirrhosis.  There is no focal hepatic lesion.  The spleen is moderately enlarged at 16 cm.  No ascites.    Impression  Hepatic cirrhosis without suspicious focal lesion.  Moderate splenomegaly.  No ascites.  Cholelithiasis.    ASSESSMENT        49 y.o. White male with:  1. Hx of chronic HCV, treated / cured  -- s/p epclusa (SVR - 6/2024)  -- No HAV / HBV immunity or exposure: vaccine underway    2. Cirrhosis, decompensated  -- MELD score: 15  -- HCC screening: u/s ok. AFP pending  -- CHRISTEL: on lasix + josette. Struggles w/ low Na diet. Needs diuretic increase    3. Portal HTN  -- No prior EGD (can't be seen by Dr Guzman, had to cancel recent appt at [facility] due to home / social  "issues)  -- low plt  -- SM, varices on imaging    4. Substance use d/o, ongoing  -- describes "side effects" w/ suboxone in past      PLAN        1. Await pending AFP  2. Increase lasix to 40mg + josette to 100mg daily.    - BMP in 1 week.  - Re-educated on low Na diet. Needs to ask mom to cook w/o salt and salt her plate separately  3. Begin Coreg 6.25mg daily x 3d -> then increase to BID.   - If able to tolerate can avoid EGD  4. Twinrix #2: return to [facility]  5. CBC, CMP, INR, AFP, U/S, VISIT every 6 months: due 3/2025    __________________________________________________________________    Duration of encounter: 43 min  This includes face-to-face time and non face-to-face time preparing to see the patient (eg, review of tests), obtaining and/or reviewing separately obtained history, documenting clinical information in the electronic or other health record, independently interpreting resultsand communicating results to the patient/family/caregiver, or care coordination.        "

## 2024-09-26 ENCOUNTER — TELEPHONE (OUTPATIENT)
Dept: HEPATOLOGY | Facility: CLINIC | Age: 49
End: 2024-09-26
Payer: MEDICAID

## 2024-09-26 NOTE — TELEPHONE ENCOUNTER
----- Message from Jennifer B. Scheuermann, PA sent at 9/26/2024 12:22 PM CDT -----  Pls tell pt liver cancer screening lab was normal. Next f/u visit due 4/1/25 w/ labs and imaging as planned

## 2024-10-01 ENCOUNTER — TELEPHONE (OUTPATIENT)
Dept: HEPATOLOGY | Facility: CLINIC | Age: 49
End: 2024-10-01
Payer: MEDICAID

## 2024-10-01 NOTE — TELEPHONE ENCOUNTER
----- Message from Cristine sent at 10/1/2024  9:27 AM CDT -----  Regarding: Appointments          Name Of Caller:   Christiano Feldman      Contact Preference:   [phone]       Nature of call:    Pt would like to reschedule today's labs. His father passed away.

## 2024-10-21 DIAGNOSIS — F17.210 NICOTINE DEPENDENCE, CIGARETTES, UNCOMPLICATED: Primary | ICD-10-CM

## 2025-04-01 ENCOUNTER — TELEPHONE (OUTPATIENT)
Dept: HEPATOLOGY | Facility: CLINIC | Age: 50
End: 2025-04-01

## 2025-04-01 ENCOUNTER — HOSPITAL ENCOUNTER (OUTPATIENT)
Dept: RADIOLOGY | Facility: HOSPITAL | Age: 50
Discharge: HOME | End: 2025-04-01
Attending: PHYSICIAN ASSISTANT
Payer: MEDICAID

## 2025-04-01 ENCOUNTER — OFFICE VISIT (OUTPATIENT)
Dept: HEPATOLOGY | Facility: CLINIC | Age: 50
End: 2025-04-01
Payer: MEDICAID

## 2025-04-01 VITALS
HEIGHT: 68 IN | SYSTOLIC BLOOD PRESSURE: 111 MMHG | DIASTOLIC BLOOD PRESSURE: 76 MMHG | HEART RATE: 67 BPM | WEIGHT: 220.44 LBS | BODY MASS INDEX: 33.41 KG/M2

## 2025-04-01 DIAGNOSIS — K74.60 HEPATIC CIRRHOSIS, UNSPECIFIED HEPATIC CIRRHOSIS TYPE, UNSPECIFIED WHETHER ASCITES PRESENT: ICD-10-CM

## 2025-04-01 DIAGNOSIS — K76.6 PORTAL VENOUS HYPERTENSION: ICD-10-CM

## 2025-04-01 DIAGNOSIS — R60.0 LEG EDEMA: ICD-10-CM

## 2025-04-01 DIAGNOSIS — K74.60 HEPATIC CIRRHOSIS, UNSPECIFIED HEPATIC CIRRHOSIS TYPE, UNSPECIFIED WHETHER ASCITES PRESENT: Primary | ICD-10-CM

## 2025-04-01 DIAGNOSIS — D69.6 THROMBOCYTOPENIA: ICD-10-CM

## 2025-04-01 DIAGNOSIS — Z86.19 HEPATITIS C VIRUS INFECTION CURED AFTER ANTIVIRAL DRUG THERAPY: ICD-10-CM

## 2025-04-01 PROCEDURE — 99215 OFFICE O/P EST HI 40 MIN: CPT | Mod: S$PBB,,, | Performed by: PHYSICIAN ASSISTANT

## 2025-04-01 PROCEDURE — 99999 PR PBB SHADOW E&M-EST. PATIENT-LVL IV: CPT | Mod: PBBFAC,,, | Performed by: PHYSICIAN ASSISTANT

## 2025-04-01 PROCEDURE — 3008F BODY MASS INDEX DOCD: CPT | Mod: CPTII,,, | Performed by: PHYSICIAN ASSISTANT

## 2025-04-01 PROCEDURE — 1160F RVW MEDS BY RX/DR IN RCRD: CPT | Mod: CPTII,,, | Performed by: PHYSICIAN ASSISTANT

## 2025-04-01 PROCEDURE — 99214 OFFICE O/P EST MOD 30 MIN: CPT | Mod: PBBFAC,PN | Performed by: PHYSICIAN ASSISTANT

## 2025-04-01 PROCEDURE — 76705 ECHO EXAM OF ABDOMEN: CPT | Mod: TC

## 2025-04-01 PROCEDURE — 1159F MED LIST DOCD IN RCRD: CPT | Mod: CPTII,,, | Performed by: PHYSICIAN ASSISTANT

## 2025-04-01 PROCEDURE — 3078F DIAST BP <80 MM HG: CPT | Mod: CPTII,,, | Performed by: PHYSICIAN ASSISTANT

## 2025-04-01 PROCEDURE — 76705 ECHO EXAM OF ABDOMEN: CPT | Mod: 26,,, | Performed by: RADIOLOGY

## 2025-04-01 PROCEDURE — 3074F SYST BP LT 130 MM HG: CPT | Mod: CPTII,,, | Performed by: PHYSICIAN ASSISTANT

## 2025-04-01 RX ORDER — CARVEDILOL 3.12 MG/1
3.12 TABLET ORAL 2 TIMES DAILY WITH MEALS
Qty: 60 TABLET | Refills: 5 | Status: CANCELLED | OUTPATIENT
Start: 2025-04-01 | End: 2026-04-01

## 2025-04-01 RX ORDER — ALBUTEROL SULFATE 90 UG/1
1 INHALANT RESPIRATORY (INHALATION)
COMMUNITY

## 2025-04-01 NOTE — PATIENT INSTRUCTIONS
Try to get next Hep A and B vaccine shot from [facility]  Continue current fluid pills (spironolactone 50mg TWO pills and furosemide 20mg ONE pill each day).   -- After I see lab results I'll call w/ instruction on how to increase  Wait for scheduling nurse to call you for EGD (upper scope)

## 2025-04-01 NOTE — TELEPHONE ENCOUNTER
----- Message from [email or web address] sent at 4/1/2025  1:33 PM CDT -----  Regarding: Consult/Advisory  Contact: Jacy ramey   Consult/Advisory Name Of Caller:Jacy ramey  Contact Preference:[phone] Nature of call:Mother is returning call to [facility] to give patients # [phone].

## 2025-04-01 NOTE — PROGRESS NOTES
"HEPATOLOGY CLINIC VISIT NOTE - HCV clinic  CHIEF COMPLAINT: Hepatitis C, Cirrhosis     HISTORY       This is a 49 y.o. White male with substance use d/o (ongoing) and bipolar d/o, here for f/u regarding his decompensated HCV Cirrhosis.    Cirrhosis history:  FibroSURE 9/1/23 F4 (CE) - supported by labs / imaging  Decompensated: TBili elevation, fluid retention (ER visits 7/2023, 8/2023)  (+) portal HTN: SM, varices on imaging, low plt 110s-130s  Varices: ?  Variceal bleed: no  Variceal banding: no    Cirrhosis health maintenance:  - Varices screening -  EGD not done  (As of 2024 NoShow to Dr Guzman x 2: can no longer schedule appts there)  - HAV status - lacking immunity (s/p 1 dose vaccine)  - HBV status - lacking immunity, HBsAg neg 7/2023 (s/p 1 dose vaccine)    HCV history:  S/p 24 wks epclusa: cured (SVR 6/2024)  - Prior Treatment: None prior to epclusa  - Genotype ?    Substance abuse / mental health:  Chart review indicates bipolar d/o & PEC 12/2022 (aggression, danger to others, med adherence issues)  Prior suboxone use, stopped b/c "side effects"  8/2023 tox (CE, PCP note): neg alcohol, pos fentanyl    Interval history (9/24/24):  Had Twinrix #1: 6/18/24 at [facility]  EGD scheduled 9/19/24: had to cancel b/c dad on hospice. Has phone # to r/s  U/S today: no liver lesions or ascites.   Labs today: stable. AFP normal    MELD 3.0: 15 at 9/24/2024 10:35 AM  MELD-Na: 11 at 9/24/2024 10:35 AM  Calculated from:  Serum Creatinine: 0.7 mg/dL (Using min of 1 mg/dL) at 9/24/2024 10:35 AM  Serum Sodium: 130 mmol/L at 9/24/2024 10:35 AM  Total Bilirubin: 1.8 mg/dL at 9/24/2024 10:35 AM  Serum Albumin: 3.5 g/dL at 9/24/2024 10:35 AM  INR(ratio): 1.2 at 9/24/2024 10:35 AM  Age at listing (hypothetical): 49 years  Sex: Male at 9/24/2024 10:35 AM    Current liver sx:  Ascites - no  CHRISTEL - intermittent, significant bilat CHRISTEL to knees at present  Diuretic use - lasix 20 + josette 50 daily  Not strict w/ low Na diet: Ham " "sandwich 2x per week, mom cooks w/ "a little salt"  TBili elevation - 1.8  No HE or EV bleed    Interval history (2025):  U/S today: no liver lesions or ascites  Labs today: pending    Current liver sx:  Ascites - no   CHRISTEL - pretibial pitting edema bilat, 2+  Diuretic use - lasix 20 + josette 100; not adhering to low Na diet, doesn't cook own food  No jaundice, HE, EV bleed    Tried carvedilol 6.25mg daily. D/c after 2d due to headache    Father  "due to old age"  Contact info in chart updated to include mother's info      PMH, PSH, SOCIAL HX, FAMILY HX      Reviewed in Epic  Pertinent findings:  FAMILY HX: neg for liver diease  SOCIAL HX: resides locally. Mentions living w/ a son.  Alcohol - denies hx of heavy use  Drugs - yes, ongoing    ROS: as per HPI    PHYSICAL EXAM:  White male, in no acute distress; alert and oriented to person, place and time  NECK: Supple  LUNGS: Normal respiratory effort.   ABDOMEN: Flat, soft, nontender. No ascites  SKIN: Warm and dry. No jaundice, No obvious rashes.   EXTREMITIES: Bilat pitting edema to knees  NEURO/PSYCH: Normal gate. Memory intact. Thought and speech pattern appropriate. Behavior normal. Affect flat but appropriately interactive    PERTINENT DIAGNOSTIC RESULTS      Lab Results   Component Value Date    WBC 9.76 2025    HGB 13.6 (L) 2025     (L) 2025     Lab Results   Component Value Date    INR 1.2 2025     Lab Results   Component Value Date    AST 28 2024    ALT 13 2024    BILITOT 1.8 (H) 2024    ALBUMIN 3.5 2024    ALKPHOS 91 2024    CREATININE 0.7 2024    BUN 5 (L) 2024     (L) 2024    K 4.1 2024    AFP 6.2 2024     Results for orders placed during the hospital encounter of 25  US Abdomen Limited  CLINICAL HISTORY:Unspecified cirrhosis of liver  TECHNIQUE:Limited ultrasound of the right upper quadrant of the abdomen (including pancreas, liver, gallbladder, " "common bile duct, and spleen) was performed.  COMPARISON:09/24/2024    FINDINGS:  Liver: Mildly enlarged measuring 17 cm. Not significantly changed.  Nodular contour suggesting cirrhosis no focal hepatic lesions.  Gallbladder: Optimally distended for evaluation.  Small echogenic foci suggesting small stones not as well demonstrated today as on the prior exam with the gallbladder was more optimally distended.  No ultrasound findings of acute cholecystitis  Biliary system: The common duct is not dilated, measuring 0.71 mm.  No intrahepatic ductal dilatation.  Spleen: Enlarged measuring 17.4 cm.  Miscellaneous: No upper abdominal ascites.    Impression  Features of cirrhosis with the nodular contour the liver and hepatosplenomegaly.  Cholelithiasis not as well demonstrated today as on the prior exam with the gallbladder not as optimally distended for evaluation today.      ASSESSMENT        49 y.o. White male with:  1. Hx of chronic HCV, treated / cured  -- s/p epclusa (SVR - 6/2024)  -- No HAV / HBV immunity or exposure: vaccine underway    2. Cirrhosis, decompensated  -- MELD score: ?  -- HCC screening: u/s ok. AFP pending  -- CHRISTEL: on lasix + josette. Struggles w/ low Na diet. Needs diuretic increase    3. Portal HTN  -- No prior EGD   *Coreg intolerance w/ prior attempt and BP too low today to retry  *can't be seen by Dr Guzman  -- low plt  -- SM, varices on imaging    4. Substance use d/o, ongoing  -- describes "side effects" w/ suboxone in past      PLAN        1. Await pending labs  2. Continue lasix 20 + josette 100  -- will increase / adjust diuretics after lab review  -- re-educated on importance of low Na diet, 2000mg daily  3. EGD for EV screen. Order for [facility] entered.   4. Twinrix #2: [facility] to [facility]  5. CBC, CMP, INR, AFP, U/S, VISIT every 6 months: due 10/2025 if today's results stable  6. HCV RNA yearly: due 10/2025     __________________________________________________________________    Duration of " encounter: 46 min  This includes face-to-face time and non face-to-face time preparing to see the patient (eg, review of tests), obtaining and/or reviewing separately obtained history, documenting clinical information in the electronic or other health record, independently interpreting resultsand communicating results to the patient/family/caregiver, or care coordination.

## 2025-04-02 ENCOUNTER — RESULTS FOLLOW-UP (OUTPATIENT)
Dept: HEPATOLOGY | Facility: CLINIC | Age: 50
End: 2025-04-02

## 2025-04-02 ENCOUNTER — TELEPHONE (OUTPATIENT)
Dept: HEPATOLOGY | Facility: CLINIC | Age: 50
End: 2025-04-02
Payer: MEDICAID

## 2025-04-02 DIAGNOSIS — K74.60 HEPATIC CIRRHOSIS, UNSPECIFIED HEPATIC CIRRHOSIS TYPE, UNSPECIFIED WHETHER ASCITES PRESENT: Primary | ICD-10-CM

## 2025-04-02 NOTE — TELEPHONE ENCOUNTER
Attempt made to reach patient.  He did not answer phone and I could not leave a msg.  I spoke with Jacy (mom) and msg from PA Scheuermann relayed.  She states that patient's ability to remember to take meds is not good and she does not think he's been taking diuretics.  She states that she will remind him to take meds as previously instructed Furosemide 20 mg, 1 tab and Spironolactone 50 mg, 2 tabs daily.  She is going to watch him for SOB and fluid retention and call hepatology back if symptoms persist for a possible med increase.  She is asking that we communicate with her for changes since patient has mental challenges.

## 2025-04-02 NOTE — TELEPHONE ENCOUNTER
I spoke with Jacy and msg from PA Scheuermann relayed.  She asked that lab draw be scheduled 4/9/25; done and reminder notice mailed.

## 2025-04-02 NOTE — TELEPHONE ENCOUNTER
Please relay the following to mom:  - yesterday all of pt's swelling was in lower legs, not in his belly at all.  - most important thing to help with swelling is that he limit sodium and salt in diet. I recommend he not add salt to any food, avoid salt when cooking (can use salt free seasonings and others eating same food can salt theirs separately after it's cooked) and need to read food labels to try and limit sodium to 2000mg each day.      If he starts taking fluid pills now he still needs BMP in one week.    thanks

## 2025-04-02 NOTE — TELEPHONE ENCOUNTER
4/1/25 labs and u/s reviewed. AFP normal  MELD 3.0: 13 at 4/1/2025 11:38 AM  MELD-Na: 12 at 4/1/2025 11:38 AM  Calculated from:  Serum Creatinine: 0.9 mg/dL (Using min of 1 mg/dL) at 4/1/2025 11:38 AM  Serum Sodium: 141 mmol/L (Using max of 137 mmol/L) at 4/1/2025 11:38 AM  Total Bilirubin: 2.9 mg/dL at 4/1/2025 11:38 AM  Serum Albumin: 3.7 g/dL (Using max of 3.5 g/dL) at 4/1/2025 11:38 AM  INR(ratio): 1.2 at 4/1/2025 11:38 AM  Age at listing (hypothetical): 49 years  Sex: Male at 4/1/2025 11:38 AM    Na 141, K 3.5, Cr 0.9, BUN 7      Pls tell [facility] labs from today are stable.   Liver cancer screening lab is normal. No tumors in liver.    Yesterday he reported taking furosemide / lasix 20mg and spironolactone 100mg (50mg, TWO pills each day)    Please tell him he can increase fluid pills as follows  - Furosemide / lasix 40mg (TWO 20mg pills)  - Spironolactone 200mg (FOUR 50mg pills)    Schedule BMP in ONE WEEK

## 2025-04-29 ENCOUNTER — HOSPITAL ENCOUNTER (EMERGENCY)
Facility: HOSPITAL | Age: 50
Discharge: HOME | End: 2025-04-29
Attending: EMERGENCY MEDICINE
Payer: MEDICAID

## 2025-04-29 VITALS
SYSTOLIC BLOOD PRESSURE: 128 MMHG | HEIGHT: 68 IN | HEART RATE: 90 BPM | BODY MASS INDEX: 30.31 KG/M2 | TEMPERATURE: 99 F | RESPIRATION RATE: 18 BRPM | WEIGHT: 200 LBS | OXYGEN SATURATION: 97 % | DIASTOLIC BLOOD PRESSURE: 78 MMHG

## 2025-04-29 DIAGNOSIS — Z86.19 HISTORY OF HEPATITIS C: ICD-10-CM

## 2025-04-29 DIAGNOSIS — F19.11 HISTORY OF DRUG ABUSE: ICD-10-CM

## 2025-04-29 DIAGNOSIS — A08.4 VIRAL GASTROENTERITIS: Primary | ICD-10-CM

## 2025-04-29 DIAGNOSIS — R17 TOTAL BILIRUBIN, ELEVATED: ICD-10-CM

## 2025-04-29 DIAGNOSIS — R11.2 NAUSEA, VOMITING, AND DIARRHEA: ICD-10-CM

## 2025-04-29 DIAGNOSIS — R10.84 GENERALIZED ABDOMINAL PAIN: ICD-10-CM

## 2025-04-29 DIAGNOSIS — Z87.19 HISTORY OF CIRRHOSIS OF LIVER: ICD-10-CM

## 2025-04-29 DIAGNOSIS — R19.7 NAUSEA, VOMITING, AND DIARRHEA: ICD-10-CM

## 2025-04-29 LAB
ABSOLUTE EOSINOPHIL (SMH): 0.06 K/UL
ABSOLUTE MONOCYTE (SMH): 0.85 K/UL (ref 0.3–1)
ABSOLUTE NEUTROPHIL COUNT (SMH): 10.5 K/UL (ref 1.8–7.7)
ALBUMIN SERPL-MCNC: 3.9 G/DL (ref 3.5–5.2)
ALP SERPL-CCNC: 81 UNIT/L (ref 40–150)
ALT SERPL-CCNC: 18 UNIT/L (ref 10–44)
ANION GAP (SMH): 9 MMOL/L (ref 8–16)
AST SERPL-CCNC: 47 UNIT/L (ref 11–45)
BASOPHILS # BLD AUTO: 0.08 K/UL
BASOPHILS NFR BLD AUTO: 0.6 %
BILIRUB DIRECT SERPL-MCNC: 1 MG/DL (ref 0.1–0.3)
BILIRUB SERPL-MCNC: 4.2 MG/DL (ref 0.1–1)
BUN SERPL-MCNC: 8 MG/DL (ref 6–20)
CALCIUM SERPL-MCNC: 9 MG/DL (ref 8.7–10.5)
CHLORIDE SERPL-SCNC: 109 MMOL/L (ref 95–110)
CO2 SERPL-SCNC: 19 MMOL/L (ref 23–29)
CREAT SERPL-MCNC: 0.6 MG/DL (ref 0.5–1.4)
ERYTHROCYTE [DISTWIDTH] IN BLOOD BY AUTOMATED COUNT: 13.8 % (ref 11.5–14.5)
GFR SERPLBLD CREATININE-BSD FMLA CKD-EPI: >60 ML/MIN/1.73/M2
GLUCOSE SERPL-MCNC: 104 MG/DL (ref 70–110)
HCT VFR BLD AUTO: 40.9 % (ref 40–54)
HGB BLD-MCNC: 14.6 GM/DL (ref 14–18)
HOLD SPECIMEN: NORMAL
HOLD SPECIMEN: NORMAL
IMM GRANULOCYTES # BLD AUTO: 0.05 K/UL (ref 0–0.04)
IMM GRANULOCYTES NFR BLD AUTO: 0.4 % (ref 0–0.5)
INR PPP: 1.3 (ref 0.8–1.2)
LIPASE SERPL-CCNC: 12 U/L (ref 4–60)
LYMPHOCYTES # BLD AUTO: 1.49 K/UL (ref 1–4.8)
MAGNESIUM SERPL-MCNC: 2 MG/DL (ref 1.6–2.6)
MCH RBC QN AUTO: 31.3 PG (ref 27–31)
MCHC RBC AUTO-ENTMCNC: 35.7 G/DL (ref 32–36)
MCV RBC AUTO: 88 FL (ref 82–98)
NUCLEATED RBC (/100WBC) (SMH): 0 /100 WBC
PLATELET # BLD AUTO: 140 K/UL (ref 150–450)
PMV BLD AUTO: 9.7 FL (ref 9.2–12.9)
POTASSIUM SERPL-SCNC: 3.5 MMOL/L (ref 3.5–5.1)
PROT SERPL-MCNC: 8 GM/DL (ref 6–8.4)
PROTHROMBIN TIME: 14 SECONDS (ref 9–12.5)
RBC # BLD AUTO: 4.66 M/UL (ref 4.6–6.2)
RELATIVE EOSINOPHIL (SMH): 0.5 % (ref 0–8)
RELATIVE LYMPHOCYTE (SMH): 11.4 % (ref 18–48)
RELATIVE MONOCYTE (SMH): 6.5 % (ref 4–15)
RELATIVE NEUTROPHIL (SMH): 80.6 % (ref 38–73)
SODIUM SERPL-SCNC: 137 MMOL/L (ref 136–145)
WBC # BLD AUTO: 13.05 K/UL (ref 3.9–12.7)

## 2025-04-29 PROCEDURE — 36415 COLL VENOUS BLD VENIPUNCTURE: CPT

## 2025-04-29 PROCEDURE — 82248 BILIRUBIN DIRECT: CPT

## 2025-04-29 PROCEDURE — 99285 EMERGENCY DEPT VISIT HI MDM: CPT | Mod: 25

## 2025-04-29 PROCEDURE — 83690 ASSAY OF LIPASE: CPT

## 2025-04-29 PROCEDURE — 85610 PROTHROMBIN TIME: CPT

## 2025-04-29 PROCEDURE — 96374 THER/PROPH/DIAG INJ IV PUSH: CPT

## 2025-04-29 PROCEDURE — 82947 ASSAY GLUCOSE BLOOD QUANT: CPT

## 2025-04-29 PROCEDURE — 85025 COMPLETE CBC W/AUTO DIFF WBC: CPT

## 2025-04-29 PROCEDURE — 25500020 PHARM REV CODE 255

## 2025-04-29 PROCEDURE — 83735 ASSAY OF MAGNESIUM: CPT

## 2025-04-29 PROCEDURE — 63600175 PHARM REV CODE 636 W HCPCS

## 2025-04-29 RX ORDER — ONDANSETRON HYDROCHLORIDE 2 MG/ML
4 INJECTION, SOLUTION INTRAVENOUS
Status: COMPLETED | OUTPATIENT
Start: 2025-04-29 | End: 2025-04-29

## 2025-04-29 RX ORDER — ONDANSETRON 4 MG/1
4 TABLET, ORALLY DISINTEGRATING ORAL EVERY 6 HOURS PRN
Qty: 15 TABLET | Refills: 0 | Status: SHIPPED | OUTPATIENT
Start: 2025-04-29

## 2025-04-29 RX ORDER — NICOTINE 7MG/24HR
1 PATCH, TRANSDERMAL 24 HOURS TRANSDERMAL ONCE
Status: DISCONTINUED | OUTPATIENT
Start: 2025-04-29 | End: 2025-04-29 | Stop reason: HOSPADM

## 2025-04-29 RX ADMIN — IOHEXOL 100 ML: 350 INJECTION, SOLUTION INTRAVENOUS at 05:04

## 2025-04-29 RX ADMIN — ONDANSETRON 4 MG: 2 INJECTION INTRAMUSCULAR; INTRAVENOUS at 04:04

## 2025-04-29 NOTE — ED PROVIDER NOTES
Encounter Date: 4/29/2025       History     Chief Complaint   Patient presents with    Nausea     X 3 days    Vomiting    Abdominal Pain     50-year-old male with a past medical history of hep C cured after antiviral drug therapy, hepatic cirrhosis, drug abuse, bipolar, and portal vein hypertension presents to ED for abdominal pain.  Patient states it started 3 days ago.  Generalized abdominal intermittent 8/10 pain.  Tums with no relief.  No previous abdominal surgeries.  Denies any sick contacts.  Patient does admit to snorting heroin.  Last use was this morning.  Does not feel like he is in withdrawals.  Admits to nausea, vomiting, diarrhea, decreased urine, headache, and body aches.  Denies fever, sweats, chills, congestion, runny nose, cough, shortness breath, chest pain, hematochezia, hematemesis, dysuria, frequency, urgency, dizziness, lightheadedness.      Review of patient's allergies indicates:  No Known Allergies  Past Medical History:   Diagnosis Date    Bipolar disorder     Cirrhosis of liver     Hepatitis C virus infection cured after antiviral drug therapy     treated / cured ([facility] 6/2024)    OD (overdose of drug)     Seizures      History reviewed. No pertinent surgical history.  No family history on file.  Social History[1]  Review of Systems   Constitutional:  Negative for chills, diaphoresis and fever.   HENT:  Negative for congestion and rhinorrhea.    Respiratory:  Negative for cough and shortness of breath.    Cardiovascular:  Negative for chest pain and leg swelling.   Gastrointestinal:  Positive for abdominal pain, diarrhea, nausea and vomiting. Negative for blood in stool and constipation.        (-) hematemesis   Genitourinary:  Positive for decreased urine volume. Negative for difficulty urinating, dysuria, frequency, hematuria and urgency.   Musculoskeletal:  Positive for myalgias (generalized body aches).   Neurological:  Positive for headaches. Negative for dizziness, weakness and  light-headedness.       Physical Exam     Initial Vitals [04/29/25 1536]   BP Pulse Resp Temp SpO2   (!) 151/79 68 20 98.3 °F (36.8 °C) 96 %      MAP       --         Physical Exam    Nursing note and vitals reviewed.  Constitutional: He appears well-developed and well-nourished. He is not diaphoretic. He is active. He does not appear ill. No distress.   HENT:   Head: Normocephalic and atraumatic.   Right Ear: External ear normal.   Left Ear: External ear normal.   Nose: Nose normal.   Eyes: Lids are normal. Pupils are equal, round, and reactive to light. Right eye exhibits no discharge. Left eye exhibits no discharge. No scleral icterus.   Neck: Phonation normal. Neck supple.   Normal range of motion.  Cardiovascular:  Normal rate and regular rhythm.           Pulses:       Radial pulses are 2+ on the right side and 2+ on the left side.   Pulmonary/Chest: Effort normal and breath sounds normal. No respiratory distress.   Abdominal: Abdomen is soft. He exhibits no distension, no fluid wave, no pulsatile midline mass and no mass. There is generalized abdominal tenderness. There is guarding. There is no rebound.   Musculoskeletal:         General: Normal range of motion.      Cervical back: Normal range of motion and neck supple.      Right lower leg: No swelling or tenderness. No edema.      Left lower leg: No swelling or tenderness. No edema.      Comments: Chronic bilateral lower extremity venous insuffiencey skin changes with hardening and darkening of the skin. No erythema, warmth, or swelling.      Neurological: He is alert and oriented to person, place, and time. He has normal strength. No sensory deficit. GCS eye subscore is 4. GCS verbal subscore is 5. GCS motor subscore is 6.   Skin: Skin is dry. Capillary refill takes less than 2 seconds.   Jaundiced         ED Course   Procedures  Labs Reviewed   COMPREHENSIVE METABOLIC PANEL - Abnormal       Result Value    Sodium 137      Potassium 3.5      Chloride 109       CO2 19 (*)     Glucose 104      BUN 8      Creatinine 0.6      Calcium 9.0      Protein Total 8.0      Albumin 3.9      Bilirubin Total 4.2 (*)     ALP 81      AST 47 (*)     ALT 18      Anion Gap 9      eGFR >60     CBC WITH DIFFERENTIAL - Abnormal    WBC 13.05 (*)     RBC 4.66      Hgb 14.6      Hct 40.9      MCV 88      MCH 31.3 (*)     MCHC 35.7      RDW 13.8      Platelet Count 140 (*)     MPV 9.7      Nucleated RBC 0      Neut % 80.6 (*)     Lymph % 11.4 (*)     Mono % 6.5      Eos % 0.5      Basophil % 0.6      Imm Grans % 0.4      Neut # 10.5 (*)     Lymph # 1.49      Mono # 0.85      Eos # 0.06      Baso # 0.08      Imm Grans # 0.05 (*)    BILIRUBIN, DIRECT - Abnormal    Bilirubin Direct 1.0 (*)    PROTIME-INR - Abnormal    PT 14.0 (*)     INR 1.3 (*)    LIPASE - Normal    Lipase Level 12     MAGNESIUM - Normal    Magnesium 2.0     CBC W/ AUTO DIFFERENTIAL    Narrative:     The following orders were created for panel order CBC W/ AUTO DIFFERENTIAL.  Procedure                               Abnormality         Status                     ---------                               -----------         ------                     CBC with Differential[[id number]]       Abnormal            Final result                 Please view results for these tests on the individual orders.   EXTRA TUBES    Narrative:     The following orders were created for panel order EXTRA TUBES.  Procedure                               Abnormality         Status                     ---------                               -----------         ------                     Light Blue Top Hold[[id number]]                             In process                 Gold Top Hold[[id number]]                                   In process                   Please view results for these tests on the individual orders.   LIGHT BLUE TOP HOLD   GOLD TOP HOLD          Imaging Results              CT Abdomen Pelvis With IV Contrast NO Oral Contrast (Edited  "Result - FINAL)  Result time 04/29/25 17:53:42      Addendum (preliminary) 1 of 1 by Darryl Sotelo DO (04/29/25 17:53:42)      "No findings of portal hypertension including splenomegaly and upper abdominal varices."  Should instead read: "Findings of portal hypertension including splenomegaly and upper abdominal varices.      Electronically signed by: Darryl Sotelo  Date:    04/29/2025  Time:    17:53                 Final result by Darryl Sotelo DO (04/29/25 17:43:01)                   Impression:      1. No acute abnormality of the abdomen or pelvis.  2. Morphologic changes of hepatic cirrhosis and findings of portal hypertension including splenomegaly and upper abdominal varices.      Electronically signed by: Darryl Sotelo  Date:    04/29/2025  Time:    17:43               Narrative:    EXAMINATION:  CT ABDOMEN PELVIS WITH IV CONTRAST    CLINICAL HISTORY:  Nausea/vomiting;Abdominal pain, acute, nonlocalized;    TECHNIQUE:  Axial CT images with sagittal and coronal reformats were obtained of the abdomen and pelvis from the hemidiaphragms through the symphysis pubis after the administration of 100mL Omnipaque 350.    COMPARISON:  CT abdomen and pelvis from 08/16/2023.    FINDINGS:  Lung Bases: Clear.    Heart: Heart size is normal.  No pericardial effusion.    Liver: There are morphologic changes of hepatic cirrhosis.  There are no focal hepatic lesions or masses.  The portal vasculature is patent.  No findings of portal hypertension including splenomegaly and upper abdominal varices.  The portal vasculature is patent.    Biliary tract: No intrahepatic or extrahepatic biliary ductal dilatation.    Gallbladder: No radiodense gallstone. No wall thickening or pericholecystic fluid.    Pancreas: Normal. No pancreatic ductal dilatation.    Spleen: The spleen is mildly enlarged, measuring up to approximately 14 cm in size.    Adrenals: Unremarkable.    Kidneys and urinary collecting systems: Normal.  No " hydronephrosis or urolithiasis.    Lymph nodes: None enlarged.    Stomach and bowel: The stomach is normal.  Loops of small and large bowel are normal in caliber without evidence for inflammation or obstruction.  The appendix is normal.    Peritoneum and mesentery: No ascites or free intraperitoneal air.  No abdominal fluid collection.    Vasculature: There is mild atherosclerosis.  There is no aneurysm.  There are upper abdominal varices, including paraesophageal, perisplenic, and perigastric varices.    Urinary bladder: No wall thickening.    Reproductive organs: The prostate is normal.    Body wall: There is small fat containing umbilical and bilateral inguinal hernias.    Musculoskeletal: No aggressive osseous lesion.  Partially visualized remote compression fracture of the T9 vertebral body.                                       Medications   nicotine 7 mg/24 hr 1 patch (has no administration in time range)   ondansetron injection 4 mg (4 mg Intravenous Given 4/29/25 1619)   iohexoL (OMNIPAQUE 350) 350 mg iodine/mL injection (100 mLs Intravenous Given 4/29/25 1731)     Medical Decision Making  50-year-old male with a past medical history of hep C cured after antiviral drug therapy, hepatic cirrhosis, drug abuse, bipolar, and portal vein hypertension presents to ED for abdominal pain.  Patient's chart and medical history reviewed.    Ddx:  Cholecystitis  Pancreatitis  Viral gastroenteritis   Gastritis  GERD  SBO  Colitis  Appendicitis  UTI    Patient's vitals reviewed.  Afebrile, no respiratory distress, and nontoxic-appearing in the ED. Patient had generalized abdominal ttp with guarding noted.  Patient appears jaundiced, but states this is his baseline color.  Chronic bilateral lower extremity venous insuffiencey skin changes with hardening and darkening of the skin. No erythema, warmth, or swelling.  Discussed this case with Dr. Mayorga who saw patient as well.  CBC showed mild leukocytosis 13.05.   Thrombocytopenia of 140.  Per chart review this is chronic and baseline.  PT INR is mildly elevated; at baseline. Lipase in normal range, pancreatitis unlikely.  Magnesium in normal range.  CMP showed elevated T bili of 4.2 with an elevated AST of 47 and a bicarb of 19.  Per chart review patient does have chronic elevation of his T bili.  T bili from April 1st was 2.9.  Patient is followed up by hepatology.  Ultrasounds already ordered outpatient, we will not get emergent ultrasound at this time. Direct bilirubin is 1. Indirect bilirubin in 3.2. most likely hepatic cause rather than post hepatic secondary to obstruction. CT abd showed no acute abnormality of the abdomen or pelvis. Morphologic changes of hepatic cirrhosis and findings of portal hypertension including splenomegaly and upper abdominal varices.  Patient tolerated p.o. challenge.  Discussed all results with patient including worsening elevation of T bili.  Patient will follow-up with his hepatologist.  Discussed with patient this is most likely a viral gastroenteritis which will take time to flush from her system.  Instructed patient to rest, stay well hydrated, and I started soft bowel diet.  Patient is sent home on Zofran as needed for nausea. Patient agrees with this plan. Discussed with him strict return precautions, he verbalized understanding. Patient is stable for discharge.     Amount and/or Complexity of Data Reviewed  External Data Reviewed: labs, radiology and notes.  Labs: ordered.  Radiology: ordered.    Risk  OTC drugs.  Prescription drug management.                                      Clinical Impression:  Final diagnoses:  [Z87.19] History of cirrhosis of liver  [R17] Total bilirubin, elevated  [R10.84] Generalized abdominal pain  [R11.2, R19.7] Nausea, vomiting, and diarrhea  [F19.11] History of drug abuse  [Z86.19] History of hepatitis C  [A08.4] Viral gastroenteritis (Primary)          ED Disposition Condition    Discharge Stable           ED Prescriptions       Medication Sig Dispense Start Date End Date Auth. Provider    ondansetron (ZOFRAN-ODT) 4 MG TbDL Take 1 tablet (4 mg total) by mouth every 6 (six) hours as needed (Nausea). 15 tablet 4/29/2025 -- Holdsworth, Alayna, PA-C          Follow-up Information       Follow up With Specialties Details Why Contact Info    Scheuermann, Jennifer B., PA Hepatology Schedule an appointment as soon as possible for a visit   [address] [address]  [place] [address]                   [1]   Social History  Tobacco Use    Smoking status: Every Day   Substance Use Topics    Alcohol use: Yes    Drug use: Yes        Holdsworth, Alayna, PA-C  04/29/25 8216

## 2025-05-06 ENCOUNTER — PATIENT MESSAGE (OUTPATIENT)
Dept: GASTROENTEROLOGY | Facility: CLINIC | Age: 50
End: 2025-05-06
Payer: MEDICAID

## 2025-06-03 ENCOUNTER — ANESTHESIA EVENT (OUTPATIENT)
Dept: ENDOSCOPY | Facility: HOSPITAL | Age: 50
End: 2025-06-03
Payer: MEDICAID

## 2025-06-03 ENCOUNTER — ANESTHESIA (OUTPATIENT)
Dept: ENDOSCOPY | Facility: HOSPITAL | Age: 50
End: 2025-06-03
Payer: MEDICAID

## 2025-06-03 ENCOUNTER — HOSPITAL ENCOUNTER (OUTPATIENT)
Facility: HOSPITAL | Age: 50
Discharge: HOME | End: 2025-06-03
Attending: INTERNAL MEDICINE | Admitting: INTERNAL MEDICINE
Payer: MEDICAID

## 2025-06-03 DIAGNOSIS — K22.10 EROSIVE ESOPHAGITIS: Primary | ICD-10-CM

## 2025-06-03 DIAGNOSIS — K74.60 HEPATIC CIRRHOSIS: ICD-10-CM

## 2025-06-03 DIAGNOSIS — K29.70 GASTRITIS, PRESENCE OF BLEEDING UNSPECIFIED, UNSPECIFIED CHRONICITY, UNSPECIFIED GASTRITIS TYPE: ICD-10-CM

## 2025-06-03 PROCEDURE — 37000008 HC ANESTHESIA 1ST 15 MINUTES: Performed by: INTERNAL MEDICINE

## 2025-06-03 PROCEDURE — 63600175 PHARM REV CODE 636 W HCPCS: Performed by: NURSE ANESTHETIST, CERTIFIED REGISTERED

## 2025-06-03 PROCEDURE — 43239 EGD BIOPSY SINGLE/MULTIPLE: CPT | Performed by: INTERNAL MEDICINE

## 2025-06-03 PROCEDURE — 43239 EGD BIOPSY SINGLE/MULTIPLE: CPT | Mod: ,,, | Performed by: INTERNAL MEDICINE

## 2025-06-03 PROCEDURE — 25000003 PHARM REV CODE 250: Performed by: INTERNAL MEDICINE

## 2025-06-03 PROCEDURE — 27201012 HC FORCEPS, HOT/COLD, DISP: Performed by: INTERNAL MEDICINE

## 2025-06-03 RX ORDER — LIDOCAINE HYDROCHLORIDE 20 MG/ML
INJECTION INTRAVENOUS
Status: DISCONTINUED | OUTPATIENT
Start: 2025-06-03 | End: 2025-06-03

## 2025-06-03 RX ORDER — SODIUM CHLORIDE 9 MG/ML
INJECTION, SOLUTION INTRAVENOUS CONTINUOUS
Status: DISCONTINUED | OUTPATIENT
Start: 2025-06-03 | End: 2025-06-03 | Stop reason: HOSPADM

## 2025-06-03 RX ORDER — PANTOPRAZOLE SODIUM 40 MG/1
40 TABLET, DELAYED RELEASE ORAL DAILY
Qty: 90 TABLET | Refills: 3 | Status: SHIPPED | OUTPATIENT
Start: 2025-06-03 | End: 2026-06-03

## 2025-06-03 RX ORDER — PROPOFOL 10 MG/ML
VIAL (ML) INTRAVENOUS
Status: DISCONTINUED | OUTPATIENT
Start: 2025-06-03 | End: 2025-06-03

## 2025-06-03 RX ADMIN — SODIUM CHLORIDE: 9 INJECTION, SOLUTION INTRAVENOUS at 11:06

## 2025-06-03 RX ADMIN — PROPOFOL 100 MG: 10 INJECTION, EMULSION INTRAVENOUS at 12:06

## 2025-06-03 RX ADMIN — LIDOCAINE HYDROCHLORIDE 100 MG: 20 INJECTION, SOLUTION INTRAVENOUS at 12:06

## 2025-06-03 RX ADMIN — PROPOFOL 50 MG: 10 INJECTION, EMULSION INTRAVENOUS at 12:06

## 2025-06-04 VITALS
HEIGHT: 68 IN | RESPIRATION RATE: 16 BRPM | OXYGEN SATURATION: 98 % | SYSTOLIC BLOOD PRESSURE: 111 MMHG | WEIGHT: 220 LBS | DIASTOLIC BLOOD PRESSURE: 70 MMHG | TEMPERATURE: 98 F | BODY MASS INDEX: 33.34 KG/M2 | HEART RATE: 70 BPM

## 2025-06-05 ENCOUNTER — RESULTS FOLLOW-UP (OUTPATIENT)
Dept: GASTROENTEROLOGY | Facility: CLINIC | Age: 50
End: 2025-06-05

## 2025-06-05 NOTE — PROGRESS NOTES
Please notify patient that biopsies reviewed and showed no bacteria, only acid reflux.  Continue current meds and follow up as previously planned.

## 2025-07-26 ENCOUNTER — HOSPITAL ENCOUNTER (INPATIENT)
Facility: HOSPITAL | Age: 50
LOS: 6 days | Discharge: HOME | DRG: 186 | End: 2025-08-01
Attending: EMERGENCY MEDICINE | Admitting: INTERNAL MEDICINE
Payer: MEDICAID

## 2025-07-26 DIAGNOSIS — J44.1 COPD EXACERBATION: ICD-10-CM

## 2025-07-26 DIAGNOSIS — K55.069 SUPERIOR MESENTERIC VEIN THROMBOSIS: ICD-10-CM

## 2025-07-26 DIAGNOSIS — R06.02 SOB (SHORTNESS OF BREATH): ICD-10-CM

## 2025-07-26 DIAGNOSIS — F17.200 TOBACCO DEPENDENCY: ICD-10-CM

## 2025-07-26 DIAGNOSIS — J22 LOWER RESPIRATORY INFECTION: Primary | ICD-10-CM

## 2025-07-26 DIAGNOSIS — I82.890 SPLENIC VEIN THROMBOSIS: ICD-10-CM

## 2025-07-26 DIAGNOSIS — R06.02 SHORTNESS OF BREATH: ICD-10-CM

## 2025-07-26 DIAGNOSIS — R07.9 CHEST PAIN: ICD-10-CM

## 2025-07-26 DIAGNOSIS — Z99.81 HYPOXEMIA REQUIRING SUPPLEMENTAL OXYGEN: ICD-10-CM

## 2025-07-26 DIAGNOSIS — R09.02 HYPOXEMIA REQUIRING SUPPLEMENTAL OXYGEN: ICD-10-CM

## 2025-07-26 DIAGNOSIS — I81 PORTAL VEIN THROMBOSIS: ICD-10-CM

## 2025-07-26 PROBLEM — J18.9 COMMUNITY ACQUIRED PNEUMONIA: Status: ACTIVE | Noted: 2025-07-26

## 2025-07-26 PROBLEM — K74.60 CIRRHOSIS: Status: ACTIVE | Noted: 2025-07-26

## 2025-07-26 LAB
ALBUMIN SERPL-MCNC: 3.2 G/DL (ref 3.5–5.2)
ALLENS TEST: ABNORMAL
ALP SERPL-CCNC: 64 UNIT/L (ref 55–135)
ALT SERPL-CCNC: 6 UNIT/L (ref 10–44)
AMMONIA PLAS-SCNC: 45 UMOL/L (ref 10–50)
AMPHET UR QL SCN: NEGATIVE
ANION GAP (SMH): 7 MMOL/L (ref 8–16)
APTT PPP: 29.3 SECONDS (ref 21–32)
AST SERPL-CCNC: 17 UNIT/L (ref 10–40)
BARBITURATE SCN PRESENT UR: NEGATIVE
BENZODIAZ UR QL SCN: NEGATIVE
BILIRUB SERPL-MCNC: 2.5 MG/DL (ref 0.1–1)
BILIRUB UR QL STRIP.AUTO: NEGATIVE
BNP SERPL-MCNC: 111 PG/ML
BUN SERPL-MCNC: 6 MG/DL (ref 6–20)
CALCIUM SERPL-MCNC: 8.3 MG/DL (ref 8.7–10.5)
CANNABINOIDS UR QL SCN: ABNORMAL
CHLORIDE SERPL-SCNC: 102 MMOL/L (ref 95–110)
CK SERPL-CCNC: 38 U/L (ref 20–200)
CLARITY UR: CLEAR
CO2 SERPL-SCNC: 27 MMOL/L (ref 23–29)
COCAINE UR QL SCN: NEGATIVE
COLOR UR AUTO: ABNORMAL
CREAT SERPL-MCNC: 0.5 MG/DL (ref 0.5–1.4)
CREAT SERPL-MCNC: 0.6 MG/DL (ref 0.5–1.4)
CREAT UR-MCNC: 187.4 MG/DL (ref 23–375)
DELSYS: ABNORMAL
EOSINOPHIL NFR BLD MANUAL: 8 % (ref 0–8)
ERYTHROCYTE [DISTWIDTH] IN BLOOD BY AUTOMATED COUNT: 13.6 % (ref 11.5–14.5)
FLOW: 2
GFR SERPLBLD CREATININE-BSD FMLA CKD-EPI: >60 ML/MIN/1.73/M2
GLUCOSE SERPL-MCNC: 95 MG/DL (ref 70–110)
GLUCOSE UR QL STRIP: NEGATIVE
GROUP A STREP MOLECULAR (OHS): NEGATIVE
HCO3 UR-SCNC: 23.1 MMOL/L (ref 24–28)
HCT VFR BLD AUTO: 38.9 % (ref 40–54)
HCV AB SERPL QL IA: REACTIVE
HGB BLD-MCNC: 13.6 GM/DL (ref 14–18)
HGB UR QL STRIP: NEGATIVE
HIV 1+2 AB+HIV1 P24 AG SERPL QL IA: NORMAL
INFLUENZA A MOLECULAR (OHS): NEGATIVE
INFLUENZA B MOLECULAR (OHS): NEGATIVE
INR PPP: 1.3 (ref 0.8–1.2)
KETONES UR QL STRIP: ABNORMAL
LDH SERPL L TO P-CCNC: 1.19 MMOL/L (ref 0.5–2.2)
LEUKOCYTE ESTERASE UR QL STRIP: NEGATIVE
LIPASE SERPL-CCNC: 10 U/L (ref 4–60)
LYMPHOCYTES NFR BLD MANUAL: 18 % (ref 18–48)
MAGNESIUM SERPL-MCNC: 2 MG/DL (ref 1.6–2.6)
MCH RBC QN AUTO: 31.4 PG (ref 27–31)
MCHC RBC AUTO-ENTMCNC: 35 G/DL (ref 32–36)
MCV RBC AUTO: 90 FL (ref 82–98)
MICROSCOPIC COMMENT: NORMAL
MODE: ABNORMAL
MONOCYTES NFR BLD MANUAL: 3 % (ref 4–15)
NEUTROPHILS NFR BLD MANUAL: 71 % (ref 38–73)
NITRITE UR QL STRIP: NEGATIVE
NUCLEATED RBC (/100WBC) (SMH): 0 /100 WBC
OHS QRS DURATION: 84 MS
OHS QTC CALCULATION: 468 MS
OPIATES UR QL SCN: NEGATIVE
PCO2 BLDA: 34.9 MMHG (ref 35–45)
PCP UR QL: NEGATIVE
PH SMN: 7.43 [PH] (ref 7.35–7.45)
PH UR STRIP: 7 [PH]
PLATELET # BLD AUTO: 115 K/UL (ref 150–450)
PLATELET BLD QL SMEAR: ABNORMAL
PMV BLD AUTO: 10.7 FL (ref 9.2–12.9)
PO2 BLDA: 69 MMHG (ref 80–100)
POC BE: -1 MMOL/L (ref -2–2)
POC SATURATED O2: 94 % (ref 95–100)
POC TCO2: 24 MMOL/L (ref 23–27)
POTASSIUM SERPL-SCNC: 3.4 MMOL/L (ref 3.5–5.1)
PROCALCITONIN SERPL-MCNC: <0.05 NG/ML
PROT SERPL-MCNC: 6.3 GM/DL (ref 6–8.4)
PROT UR QL STRIP: ABNORMAL
PROTHROMBIN TIME: 14.2 SECONDS (ref 9–12.5)
RBC # BLD AUTO: 4.33 M/UL (ref 4.6–6.2)
RBC #/AREA URNS AUTO: 3 /HPF
SAMPLE: ABNORMAL
SAMPLE: NORMAL
SAMPLE: NORMAL
SARS-COV-2 RDRP RESP QL NAA+PROBE: NEGATIVE
SITE: ABNORMAL
SODIUM SERPL-SCNC: 136 MMOL/L (ref 136–145)
SP GR UR STRIP: >=1.03
SP02: 97
TROPONIN HIGH SENSITIVE (SMH): 6 PG/ML
TSH SERPL-ACNC: 0.66 UIU/ML (ref 0.34–5.6)
UROBILINOGEN UR STRIP-ACNC: ABNORMAL EU/DL
WBC # BLD AUTO: 8.54 K/UL (ref 3.9–12.7)
WBC #/AREA URNS AUTO: 2 /HPF

## 2025-07-26 PROCEDURE — U0002 COVID-19 LAB TEST NON-CDC: HCPCS | Performed by: EMERGENCY MEDICINE

## 2025-07-26 PROCEDURE — 96361 HYDRATE IV INFUSION ADD-ON: CPT

## 2025-07-26 PROCEDURE — S4991 NICOTINE PATCH NONLEGEND: HCPCS

## 2025-07-26 PROCEDURE — 86803 HEPATITIS C AB TEST: CPT | Performed by: EMERGENCY MEDICINE

## 2025-07-26 PROCEDURE — 80053 COMPREHEN METABOLIC PANEL: CPT | Performed by: EMERGENCY MEDICINE

## 2025-07-26 PROCEDURE — 63600175 PHARM REV CODE 636 W HCPCS: Performed by: EMERGENCY MEDICINE

## 2025-07-26 PROCEDURE — 25000003 PHARM REV CODE 250

## 2025-07-26 PROCEDURE — 25000003 PHARM REV CODE 250: Performed by: EMERGENCY MEDICINE

## 2025-07-26 PROCEDURE — G0378 HOSPITAL OBSERVATION PER HR: HCPCS

## 2025-07-26 PROCEDURE — 36415 COLL VENOUS BLD VENIPUNCTURE: CPT | Performed by: EMERGENCY MEDICINE

## 2025-07-26 PROCEDURE — 82550 ASSAY OF CK (CPK): CPT | Performed by: EMERGENCY MEDICINE

## 2025-07-26 PROCEDURE — 63600175 PHARM REV CODE 636 W HCPCS

## 2025-07-26 PROCEDURE — 99285 EMERGENCY DEPT VISIT HI MDM: CPT | Mod: 25

## 2025-07-26 PROCEDURE — 82803 BLOOD GASES ANY COMBINATION: CPT

## 2025-07-26 PROCEDURE — 87651 STREP A DNA AMP PROBE: CPT | Performed by: EMERGENCY MEDICINE

## 2025-07-26 PROCEDURE — 93005 ELECTROCARDIOGRAM TRACING: CPT | Performed by: INTERNAL MEDICINE

## 2025-07-26 PROCEDURE — 85610 PROTHROMBIN TIME: CPT | Performed by: EMERGENCY MEDICINE

## 2025-07-26 PROCEDURE — 11000001 HC ACUTE MED/SURG PRIVATE ROOM

## 2025-07-26 PROCEDURE — 84443 ASSAY THYROID STIM HORMONE: CPT | Performed by: EMERGENCY MEDICINE

## 2025-07-26 PROCEDURE — 85025 COMPLETE CBC W/AUTO DIFF WBC: CPT | Performed by: EMERGENCY MEDICINE

## 2025-07-26 PROCEDURE — 81001 URINALYSIS AUTO W/SCOPE: CPT | Performed by: EMERGENCY MEDICINE

## 2025-07-26 PROCEDURE — 99900035 HC TECH TIME PER 15 MIN (STAT)

## 2025-07-26 PROCEDURE — 99900031 HC PATIENT EDUCATION (STAT)

## 2025-07-26 PROCEDURE — 83690 ASSAY OF LIPASE: CPT | Performed by: EMERGENCY MEDICINE

## 2025-07-26 PROCEDURE — 87502 INFLUENZA DNA AMP PROBE: CPT | Performed by: EMERGENCY MEDICINE

## 2025-07-26 PROCEDURE — 25500020 PHARM REV CODE 255: Performed by: EMERGENCY MEDICINE

## 2025-07-26 PROCEDURE — 94761 N-INVAS EAR/PLS OXIMETRY MLT: CPT

## 2025-07-26 PROCEDURE — 87040 BLOOD CULTURE FOR BACTERIA: CPT | Performed by: EMERGENCY MEDICINE

## 2025-07-26 PROCEDURE — 25000242 PHARM REV CODE 250 ALT 637 W/ HCPCS: Performed by: EMERGENCY MEDICINE

## 2025-07-26 PROCEDURE — 93010 ELECTROCARDIOGRAM REPORT: CPT | Mod: ,,, | Performed by: INTERNAL MEDICINE

## 2025-07-26 PROCEDURE — 94799 UNLISTED PULMONARY SVC/PX: CPT

## 2025-07-26 PROCEDURE — 83735 ASSAY OF MAGNESIUM: CPT | Performed by: EMERGENCY MEDICINE

## 2025-07-26 PROCEDURE — 80307 DRUG TEST PRSMV CHEM ANLYZR: CPT | Performed by: EMERGENCY MEDICINE

## 2025-07-26 PROCEDURE — 27000221 HC OXYGEN, UP TO 24 HOURS

## 2025-07-26 PROCEDURE — 85730 THROMBOPLASTIN TIME PARTIAL: CPT | Performed by: EMERGENCY MEDICINE

## 2025-07-26 PROCEDURE — 87522 HEPATITIS C REVRS TRNSCRPJ: CPT | Performed by: EMERGENCY MEDICINE

## 2025-07-26 PROCEDURE — 96372 THER/PROPH/DIAG INJ SC/IM: CPT

## 2025-07-26 PROCEDURE — 94640 AIRWAY INHALATION TREATMENT: CPT

## 2025-07-26 PROCEDURE — 82140 ASSAY OF AMMONIA: CPT

## 2025-07-26 PROCEDURE — 36600 WITHDRAWAL OF ARTERIAL BLOOD: CPT

## 2025-07-26 PROCEDURE — 84484 ASSAY OF TROPONIN QUANT: CPT | Performed by: EMERGENCY MEDICINE

## 2025-07-26 PROCEDURE — 87389 HIV-1 AG W/HIV-1&-2 AB AG IA: CPT | Performed by: EMERGENCY MEDICINE

## 2025-07-26 PROCEDURE — 84145 PROCALCITONIN (PCT): CPT | Performed by: EMERGENCY MEDICINE

## 2025-07-26 PROCEDURE — 83880 ASSAY OF NATRIURETIC PEPTIDE: CPT | Performed by: EMERGENCY MEDICINE

## 2025-07-26 PROCEDURE — 96374 THER/PROPH/DIAG INJ IV PUSH: CPT

## 2025-07-26 PROCEDURE — 36415 COLL VENOUS BLD VENIPUNCTURE: CPT

## 2025-07-26 RX ORDER — FLUOXETINE 20 MG/1
20 CAPSULE ORAL DAILY
Status: DISCONTINUED | OUTPATIENT
Start: 2025-07-27 | End: 2025-08-01 | Stop reason: HOSPADM

## 2025-07-26 RX ORDER — SODIUM,POTASSIUM PHOSPHATES 280-250MG
2 POWDER IN PACKET (EA) ORAL
Status: DISCONTINUED | OUTPATIENT
Start: 2025-07-26 | End: 2025-08-01 | Stop reason: HOSPADM

## 2025-07-26 RX ORDER — TALC
6 POWDER (GRAM) TOPICAL NIGHTLY PRN
Status: DISCONTINUED | OUTPATIENT
Start: 2025-07-26 | End: 2025-08-01 | Stop reason: HOSPADM

## 2025-07-26 RX ORDER — ONDANSETRON HYDROCHLORIDE 2 MG/ML
4 INJECTION, SOLUTION INTRAVENOUS EVERY 6 HOURS PRN
Status: DISCONTINUED | OUTPATIENT
Start: 2025-07-26 | End: 2025-08-01 | Stop reason: HOSPADM

## 2025-07-26 RX ORDER — OXCARBAZEPINE 150 MG/1
300 TABLET, FILM COATED ORAL 2 TIMES DAILY
Status: DISCONTINUED | OUTPATIENT
Start: 2025-07-26 | End: 2025-08-01 | Stop reason: HOSPADM

## 2025-07-26 RX ORDER — IBUPROFEN 200 MG
24 TABLET ORAL
Status: DISCONTINUED | OUTPATIENT
Start: 2025-07-26 | End: 2025-08-01 | Stop reason: HOSPADM

## 2025-07-26 RX ORDER — PREDNISONE 20 MG/1
40 TABLET ORAL DAILY
Status: COMPLETED | OUTPATIENT
Start: 2025-07-27 | End: 2025-07-31

## 2025-07-26 RX ORDER — BUPRENORPHINE HYDROCHLORIDE AND NALOXONE HYDROCHLORIDE DIHYDRATE 8; 2 MG/1; MG/1
1 TABLET SUBLINGUAL 2 TIMES DAILY
COMMUNITY
Start: 2025-07-01

## 2025-07-26 RX ORDER — BUDESONIDE 0.5 MG/2ML
0.5 INHALANT ORAL ONCE
Status: COMPLETED | OUTPATIENT
Start: 2025-07-26 | End: 2025-07-26

## 2025-07-26 RX ORDER — CLONAZEPAM 1 MG/1
1 TABLET ORAL 2 TIMES DAILY PRN
Status: DISCONTINUED | OUTPATIENT
Start: 2025-07-26 | End: 2025-08-01 | Stop reason: HOSPADM

## 2025-07-26 RX ORDER — CLONIDINE HYDROCHLORIDE 0.1 MG/1
0.1 TABLET ORAL NIGHTLY
Status: DISCONTINUED | OUTPATIENT
Start: 2025-07-26 | End: 2025-08-01 | Stop reason: HOSPADM

## 2025-07-26 RX ORDER — NALOXONE HCL 0.4 MG/ML
0.02 VIAL (ML) INJECTION
Status: DISCONTINUED | OUTPATIENT
Start: 2025-07-26 | End: 2025-07-26

## 2025-07-26 RX ORDER — LANOLIN ALCOHOL/MO/W.PET/CERES
800 CREAM (GRAM) TOPICAL
Status: DISCONTINUED | OUTPATIENT
Start: 2025-07-26 | End: 2025-08-01 | Stop reason: HOSPADM

## 2025-07-26 RX ORDER — BUPRENORPHINE AND NALOXONE 8; 2 MG/1; MG/1
1 FILM, SOLUBLE BUCCAL; SUBLINGUAL 2 TIMES DAILY
Status: DISCONTINUED | OUTPATIENT
Start: 2025-07-26 | End: 2025-08-01 | Stop reason: HOSPADM

## 2025-07-26 RX ORDER — OLANZAPINE 5 MG/1
20 TABLET, FILM COATED ORAL NIGHTLY
Status: DISCONTINUED | OUTPATIENT
Start: 2025-07-26 | End: 2025-08-01 | Stop reason: HOSPADM

## 2025-07-26 RX ORDER — IBUPROFEN 200 MG
16 TABLET ORAL
Status: DISCONTINUED | OUTPATIENT
Start: 2025-07-26 | End: 2025-08-01 | Stop reason: HOSPADM

## 2025-07-26 RX ORDER — LEVALBUTEROL INHALATION SOLUTION 1.25 MG/3ML
1.25 SOLUTION RESPIRATORY (INHALATION)
Status: COMPLETED | OUTPATIENT
Start: 2025-07-26 | End: 2025-07-26

## 2025-07-26 RX ORDER — IPRATROPIUM BROMIDE 0.5 MG/2.5ML
0.5 SOLUTION RESPIRATORY (INHALATION) ONCE
Status: COMPLETED | OUTPATIENT
Start: 2025-07-26 | End: 2025-07-26

## 2025-07-26 RX ORDER — CEFEPIME HYDROCHLORIDE 2 G/1
2 INJECTION, POWDER, FOR SOLUTION INTRAVENOUS ONCE
Status: COMPLETED | OUTPATIENT
Start: 2025-07-26 | End: 2025-07-26

## 2025-07-26 RX ORDER — IBUPROFEN 200 MG
1 TABLET ORAL DAILY
Status: DISCONTINUED | OUTPATIENT
Start: 2025-07-26 | End: 2025-08-01 | Stop reason: HOSPADM

## 2025-07-26 RX ORDER — ACETAMINOPHEN 325 MG/1
650 TABLET ORAL EVERY 8 HOURS PRN
Status: DISCONTINUED | OUTPATIENT
Start: 2025-07-26 | End: 2025-08-01 | Stop reason: HOSPADM

## 2025-07-26 RX ORDER — FUROSEMIDE 20 MG/1
20 TABLET ORAL DAILY
Status: DISCONTINUED | OUTPATIENT
Start: 2025-07-27 | End: 2025-07-27

## 2025-07-26 RX ORDER — ALUMINUM HYDROXIDE, MAGNESIUM HYDROXIDE, AND SIMETHICONE 1200; 120; 1200 MG/30ML; MG/30ML; MG/30ML
30 SUSPENSION ORAL 4 TIMES DAILY PRN
Status: DISCONTINUED | OUTPATIENT
Start: 2025-07-26 | End: 2025-08-01 | Stop reason: HOSPADM

## 2025-07-26 RX ORDER — NALOXONE HCL 0.4 MG/ML
0.4 VIAL (ML) INJECTION
Status: DISCONTINUED | OUTPATIENT
Start: 2025-07-26 | End: 2025-08-01 | Stop reason: HOSPADM

## 2025-07-26 RX ORDER — ACETAMINOPHEN 325 MG/1
650 TABLET ORAL EVERY 4 HOURS PRN
Status: DISCONTINUED | OUTPATIENT
Start: 2025-07-26 | End: 2025-08-01 | Stop reason: HOSPADM

## 2025-07-26 RX ORDER — ENOXAPARIN SODIUM 100 MG/ML
40 INJECTION SUBCUTANEOUS EVERY 24 HOURS
Status: DISCONTINUED | OUTPATIENT
Start: 2025-07-26 | End: 2025-07-27

## 2025-07-26 RX ORDER — BUPRENORPHINE HYDROCHLORIDE AND NALOXONE HYDROCHLORIDE DIHYDRATE 8; 2 MG/1; MG/1
8 TABLET SUBLINGUAL 2 TIMES DAILY
Status: DISCONTINUED | OUTPATIENT
Start: 2025-07-26 | End: 2025-07-26

## 2025-07-26 RX ORDER — SODIUM CHLORIDE 0.9 % (FLUSH) 0.9 %
3 SYRINGE (ML) INJECTION
Status: DISCONTINUED | OUTPATIENT
Start: 2025-07-26 | End: 2025-08-01 | Stop reason: HOSPADM

## 2025-07-26 RX ORDER — IPRATROPIUM BROMIDE AND ALBUTEROL SULFATE 2.5; .5 MG/3ML; MG/3ML
3 SOLUTION RESPIRATORY (INHALATION)
Status: DISCONTINUED | OUTPATIENT
Start: 2025-07-26 | End: 2025-08-01 | Stop reason: HOSPADM

## 2025-07-26 RX ORDER — GLUCAGON 1 MG
1 KIT INJECTION
Status: DISCONTINUED | OUTPATIENT
Start: 2025-07-26 | End: 2025-08-01 | Stop reason: HOSPADM

## 2025-07-26 RX ORDER — CEFTRIAXONE 1 G/1
1 INJECTION, POWDER, FOR SOLUTION INTRAMUSCULAR; INTRAVENOUS
Status: DISCONTINUED | OUTPATIENT
Start: 2025-07-26 | End: 2025-07-27

## 2025-07-26 RX ORDER — PANTOPRAZOLE SODIUM 40 MG/1
40 TABLET, DELAYED RELEASE ORAL DAILY
Status: DISCONTINUED | OUTPATIENT
Start: 2025-07-27 | End: 2025-08-01 | Stop reason: HOSPADM

## 2025-07-26 RX ORDER — AMOXICILLIN 250 MG
1 CAPSULE ORAL DAILY PRN
Status: DISCONTINUED | OUTPATIENT
Start: 2025-07-26 | End: 2025-08-01 | Stop reason: HOSPADM

## 2025-07-26 RX ADMIN — POTASSIUM BICARBONATE 40 MEQ: 782 TABLET, EFFERVESCENT ORAL at 05:07

## 2025-07-26 RX ADMIN — BUDESONIDE INHALATION 0.5 MG: 0.5 SUSPENSION RESPIRATORY (INHALATION) at 03:07

## 2025-07-26 RX ADMIN — AZITHROMYCIN DIHYDRATE 500 MG: 500 INJECTION, POWDER, LYOPHILIZED, FOR SOLUTION INTRAVENOUS at 08:07

## 2025-07-26 RX ADMIN — CEFTRIAXONE SODIUM 1 G: 1 INJECTION, POWDER, FOR SOLUTION INTRAMUSCULAR; INTRAVENOUS at 11:07

## 2025-07-26 RX ADMIN — OXCARBAZEPINE 300 MG: 150 TABLET, FILM COATED ORAL at 08:07

## 2025-07-26 RX ADMIN — ENOXAPARIN SODIUM 40 MG: 100 INJECTION SUBCUTANEOUS at 05:07

## 2025-07-26 RX ADMIN — CLONIDINE HYDROCHLORIDE 0.1 MG: 0.1 TABLET ORAL at 08:07

## 2025-07-26 RX ADMIN — SODIUM CHLORIDE 250 ML: 9 INJECTION, SOLUTION INTRAVENOUS at 03:07

## 2025-07-26 RX ADMIN — BUPRENORPHINE AND NALOXONE 1 FILM: 8; 2 FILM BUCCAL; SUBLINGUAL at 09:07

## 2025-07-26 RX ADMIN — IOHEXOL 100 ML: 350 INJECTION, SOLUTION INTRAVENOUS at 04:07

## 2025-07-26 RX ADMIN — CEFEPIME 2 G: 2 INJECTION, POWDER, FOR SOLUTION INTRAVENOUS at 05:07

## 2025-07-26 RX ADMIN — LEVALBUTEROL HYDROCHLORIDE 1.25 MG: 1.25 SOLUTION RESPIRATORY (INHALATION) at 03:07

## 2025-07-26 RX ADMIN — Medication 6 MG: at 08:07

## 2025-07-26 RX ADMIN — IPRATROPIUM BROMIDE 0.5 MG: 0.5 SOLUTION RESPIRATORY (INHALATION) at 03:07

## 2025-07-26 RX ADMIN — NICOTINE 1 PATCH: 21 PATCH, EXTENDED RELEASE TRANSDERMAL at 05:07

## 2025-07-26 RX ADMIN — OLANZAPINE 20 MG: 5 TABLET, FILM COATED ORAL at 08:07

## 2025-07-26 NOTE — CARE UPDATE
07/26/25 1550   Patient Assessment/Suction   Level of Consciousness (AVPU) alert   Respiratory Effort Unlabored   Expansion/Accessory Muscles/Retractions no use of accessory muscles   All Lung Fields Breath Sounds wheezes, expiratory   Rhythm/Pattern, Respiratory shortness of breath   Cough Frequency infrequent   Cough Type congested   PRE-TX-O2   Device (Oxygen Therapy) nasal cannula   $ Is the patient on Low Flow Oxygen? Yes   Flow (L/min) (Oxygen Therapy) 2   SpO2 96 %   Pulse Oximetry Type Continuous   $ Pulse Oximetry - Multiple Charge Pulse Oximetry - Multiple   Pulse 75   Resp 19   Aerosol Therapy   $ Aerosol Therapy Charges Aerosol Treatment   Daily Review of Necessity (SVN) completed   Respiratory Treatment Status (SVN) given   Treatment Route (SVN) mask;air   Patient Position HOB elevated   Post Treatment Assessment (SVN) increased aeration;patient reports breathing improved;wheezing decreased   Signs of Intolerance (SVN) none   Breath Sounds Post-Respiratory Treatment   Throughout All Fields Post-Treatment All Fields   Throughout All Fields Post-Treatment aeration increased   Post-treatment Heart Rate (beats/min) 69   Post-treatment Resp Rate (breaths/min) 17   Education   $ Education Bronchodilator;Oxygen;15 min   Tobacco Cessation Intervention   Do you use any type of tobacco product? Yes   Respiratory Evaluation   $ Care Plan Tech Time 15 min   $ Respiratory Evaluation Complete   Evaluation For New Orders   Admitting Diagnosis bilateral pna   Cardiac Diagnosis na   Pulmonary Diagnosis na   Home Oxygen   Has Home Oxygen? No   Home Aerosol, MDI, DPI, and Other Treatments/Therapies   Home Respiratory Therapy Per Patient/Review of Chart Yes   Aerosol Home Meds/Freq albuterol q6prn   Oxygen Care Plan   Oxygen Care Plan Per Protocol   SPO2 Goal (%) MD order   Rationale SpO2 is <MD Goal   Bronchodilator Care Plan   Rationale No Rationale found   Atelectasis Care Plan   Rationale No Rational Found   Airway  Clearance Care Plan   Rationale No rationale found

## 2025-07-26 NOTE — ASSESSMENT & PLAN NOTE
Patient has a diagnosis of pneumonia. The cause of the pneumonia is suspected to be bacterial in etiology but organism is not known. The pneumonia is stable. The patient has the following signs/symptoms of pneumonia: persistent hypoxia , cough, and shortness of breath. The patient does have a current oxygen requirement and the patient does not have a home oxygen requirement.    Current antimicrobial regimen consists of the antibiotics listed below. Will monitor patient closely and continue current treatment plan unchanged.    Antibiotics (From admission, onward)      Start     Stop Route Frequency Ordered    07/26/25 2000  azithromycin (ZITHROMAX) 500 mg in 0.9% NaCl 250 mL IVPB (admixture device)         07/29/25 1959 IV Every 24 hours (non-standard times) 07/26/25 1847 07/26/25 0000  cefTRIAXone injection 1 g         -- IV Every 24 hours (non-standard times) 07/26/25 1842            Microbiology Results (last 7 days)       Procedure Component Value Units Date/Time    Influenza A & B by Molecular [[id number]]  (Normal) Collected: 07/26/25 1444    Order Status: Completed Specimen: Nasal Swab Updated: 07/26/25 1534     INFLUENZA A MOLECULAR Negative     INFLUENZA B MOLECULAR  Negative    Group A Strep, Molecular [[id number]]  (Normal) Collected: 07/26/25 1444    Order Status: Completed Specimen: Throat Updated: 07/26/25 1516     Group A Strep Molecular Negative    Blood culture x two cultures. Draw prior to antibiotics. [[id number]] Collected: 07/26/25 1502    Order Status: Sent Specimen: Blood from Peripheral, Hand, Left Updated: 07/26/25 1507    Blood culture x two cultures. Draw prior to antibiotics. [[id number]] Collected: 07/26/25 1453    Order Status: Sent Specimen: Blood from Peripheral, Forearm, Right Updated: 07/26/25 1507

## 2025-07-26 NOTE — ASSESSMENT & PLAN NOTE
Patient with known Cirrhosis with Child's class B. Co-morbidities are present and inclusive of malnutrition and anemia/pancytopenia.  MELD-Na score calculated; MELD 3.0: 14 at 7/26/2025  2:37 PM  MELD-Na: 14 at 7/26/2025  2:37 PM  Calculated from:  Serum Creatinine: 0.5 mg/dL (Using min of 1 mg/dL) at 7/26/2025  2:37 PM  Serum Sodium: 136 mmol/L at 7/26/2025  2:37 PM  Total Bilirubin: 2.5 mg/dL at 7/26/2025  2:37 PM  Serum Albumin: 3.2 g/dL at 7/26/2025  2:37 PM  INR(ratio): 1.3 at 7/26/2025  2:37 PM  Age at listing (hypothetical): 50 years  Sex: Male at 7/26/2025  2:37 PM      Continue chronic meds. Etiology likely Hepatitis. Will avoid any hepatotoxic meds, and monitor CBC/CMP/INR for synthetic function. Resume home lasix    Follows with hepatology and has had recent EGD

## 2025-07-26 NOTE — ASSESSMENT & PLAN NOTE
Patient with Hypoxic Respiratory failure which is Acute.  he is not on home oxygen. Supplemental oxygen was provided and noted-      .   Signs/symptoms of respiratory failure include- tachypnea and wheezing. Contributing diagnoses includes - COPD and Pneumonia Labs and images were reviewed. Patient Has recent ABG, which has been reviewed. Will treat underlying causes and adjust management of respiratory failure as follows- antibiotics, steroids, nebulizers

## 2025-07-26 NOTE — PHARMACY MED REC
"Admission Medication History     The home medication history was taken by Jhonny Guerra.    You may go to "Admission" then "Reconcile Home Medications" tabs to review and/or act upon these items.     The home medication list has been updated by the Pharmacy department.   Please read ALL comments highlighted in yellow.   Please address this information as you see fit.    Feel free to contact us if you have any questions or require assistance.      The medications listed below were removed from the home medication list. Please reorder if appropriate:  Patient reports no longer taking the following medication(s):  Albuterol HFA  Zofran 4 mg  Spironolactone 50 mg  D2 50,000 units    Medications listed below were obtained from: Patient/family and Analytic software- [employer]  No current facility-administered medications on file prior to encounter.     Current Outpatient Medications on File Prior to Encounter   Medication Sig Dispense Refill    albuterol-ipratropium (DUO-NEB) 2.5 mg-0.5 mg/3 mL nebulizer solution Take 3 mLs by nebulization every 6 (six) hours as needed for Wheezing. Rescue 75 mL 0    buprenorphine-naloxone 8-2 (SUBOXONE) 8-2 mg Subl Place 1 tablet under the tongue 2 (two) times daily.      clonazePAM (KLONOPIN) 1 MG tablet Take 1 mg by mouth 2 (two) times daily as needed for Anxiety.      cloNIDine (CATAPRES) 0.1 MG tablet Take 0.1 mg by mouth every evening.      FLUoxetine 20 MG capsule Take 20 mg by mouth once daily.      furosemide (LASIX) 20 MG tablet Take 2 tablets (40 mg total) by mouth once daily. (Patient taking differently: Take 20 mg by mouth once daily.) 60 tablet 5    OLANZapine (ZYPREXA) 20 MG tablet Take 1 tablet by mouth every evening.      OXcarbazepine (TRILEPTAL) 300 MG Tab Take 300 mg by mouth 2 (two) times daily.      pantoprazole (PROTONIX) 40 MG tablet Take 1 tablet (40 mg total) by mouth once daily. 90 tablet 3    NARCAN 4 mg/actuation Spry 1 spray in 1 nostril as needed for opioid " overdose; may repeat 1 spray in alternating nostrils every 2-3 minutes as needed until patient is responsive or EMS arrives      [DISCONTINUED] albuterol (PROVENTIL/VENTOLIN HFA) 90 mcg/actuation inhaler Inhale 1 puff into the lungs every 4 to 6 hours as needed.      [DISCONTINUED] hepatitis A and B vaccine, PF, (TWINRIX) 720 ADRIAN unit- 20 mcg/mL Syrg suspension Inject 1mL IM at 0, 1, and 6 months 1 mL 2    [DISCONTINUED] ondansetron (ZOFRAN-ODT) 4 MG TbDL Take 1 tablet (4 mg total) by mouth every 6 (six) hours as needed (Nausea). 15 tablet 0    [DISCONTINUED] spironolactone (ALDACTONE) 50 MG tablet Take 2 tablets (100 mg total) by mouth once daily. 60 tablet 5    [DISCONTINUED] VITAMIN D2 1,250 mcg (50,000 unit) capsule Take 50,000 Units by mouth every 7 days.           Jhonny Guerra  EXT 1921                .

## 2025-07-26 NOTE — ED PROVIDER NOTES
Encounter Date: 7/26/2025       History     Chief Complaint   Patient presents with    Shortness of Breath     Sent from Urgent Care. Was told he had double pneumonia today.     50-year-old male current smoker with history of cirrhosis presents complaining of progressively worsening shortness of breath coughing wheezing body aches chills and malaise over the past 2-3 days.  The patient went to urgent care today, was diagnosed with bilateral pneumonia and sent to the emergency room.  Patient noted to be hypoxic with an oxygen saturation of 89% on room air with tachypnea, placed on supplemental oxygen with improvement.  Patient reports he has been coughing up green sputum.  Reports body aches malaise and chills.  Has not checked his temperature.  Denies any headache neck pain or stiffness.  Has midsternal nonradiating chest pain with coughing and somewhat with deep breathing but denies chest pain otherwise.  Also has nonradiating upper epigastric pain.  His appetite has been decreased.  He has had nausea.  Denies diarrhea or constipation.  Denies any other problems or complaints.        Review of patient's allergies indicates:  No Known Allergies  Past Medical History:   Diagnosis Date    Bipolar disorder     Cirrhosis of liver     Hepatitis C virus infection cured after antiviral drug therapy     treated / cured (SVR 6/2024)    OD (overdose of drug)     Seizures      Past Surgical History:   Procedure Laterality Date    ESOPHAGOGASTRODUODENOSCOPY N/A 6/3/2025    Procedure: EGD (ESOPHAGOGASTRODUODENOSCOPY);  Surgeon: Tony Tinajero MD;  Location: [facility];  Service: Endoscopy;  Laterality: N/A;  ppm     No family history on file.  Social History[1]  Review of Systems   Constitutional:  Positive for activity change, appetite change, chills and fatigue. Negative for fever.   HENT:  Positive for congestion, rhinorrhea and sinus pressure. Negative for facial swelling, sore throat and trouble swallowing.     Respiratory:  Positive for cough, shortness of breath and wheezing.    Cardiovascular:  Positive for chest pain. Negative for palpitations and leg swelling.   Gastrointestinal: Negative.  Negative for abdominal distention, abdominal pain, anal bleeding, blood in stool, constipation, diarrhea and vomiting.   Genitourinary: Negative.  Negative for decreased urine volume, difficulty urinating, dysuria, flank pain and testicular pain.   Musculoskeletal:  Positive for myalgias. Negative for gait problem, neck pain and neck stiffness.   Skin:  Positive for pallor.   Neurological:  Positive for light-headedness. Negative for syncope, facial asymmetry, speech difficulty, numbness and headaches.   Psychiatric/Behavioral: Negative.  Negative for confusion.    All other systems reviewed and are negative.      Physical Exam     Initial Vitals [07/26/25 1208]   BP Pulse Resp Temp SpO2   112/71 75 (!) 22 98.1 °F (36.7 °C) (!) 92 %      MAP       --         Physical Exam    Nursing note and vitals reviewed.  Constitutional: He is cooperative. He does not appear ill. No distress.   HENT:   Head: Normocephalic and atraumatic.   Nose: Nose normal. Mouth/Throat: Uvula is midline, oropharynx is clear and moist and mucous membranes are normal. No uvula swelling.   Eyes: Conjunctivae, EOM and lids are normal. Pupils are equal, round, and reactive to light.   Neck: Trachea normal and phonation normal. Neck supple. No stridor present. No JVD present.   Normal range of motion.   Full passive range of motion without pain.     Cardiovascular:  Normal rate, regular rhythm, normal heart sounds, intact distal pulses and normal pulses.     Exam reveals no gallop, no distant heart sounds, no friction rub and no decreased pulses.       No murmur heard.  Pulmonary/Chest: Effort normal. Tachypnea noted. No respiratory distress. He has decreased breath sounds. He has wheezes. He has rhonchi. He has no rales.   Pulse ox 89%, placed on supplemental  oxygen with improvement in respiratory rate.   Abdominal: Abdomen is soft. Bowel sounds are normal. He exhibits no distension, no pulsatile midline mass and no mass. There is no abdominal tenderness.   No right CVA tenderness.  No left CVA tenderness.   Musculoskeletal:         General: No tenderness or edema. Normal range of motion.      Right hand: Normal. Normal capillary refill. Normal pulse.      Left hand: Normal. Normal capillary refill. Normal pulse.      Cervical back: Normal, full passive range of motion without pain, normal range of motion and neck supple. No tenderness or bony tenderness. No pain with movement. Normal range of motion and normal range of motion. Normal.      Thoracic back: Normal. No tenderness or bony tenderness. Normal range of motion.      Lumbar back: Normal. No tenderness or bony tenderness. Normal range of motion.      Right lower leg: No tenderness. No edema.      Left lower leg: No tenderness. No edema.      Right foot: Normal. Normal capillary refill. No swelling. Normal pulse.      Left foot: Normal. Normal capillary refill. No swelling. Normal pulse.      Comments: Pulses are 2+ throughout, cap refill is less than 2 sec throughout,extremities are nontender throughout with full range of motion     Neurological: He is alert and oriented to person, place, and time. He has normal strength. No cranial nerve deficit or sensory deficit. Coordination and gait normal. GCS score is 15. GCS eye subscore is 4. GCS verbal subscore is 5. GCS motor subscore is 6.   No focal deficits   Skin: Skin is warm and dry. Capillary refill takes less than 2 seconds. No petechiae and no rash noted. No cyanosis or erythema. No pallor.   Psychiatric: He has a normal mood and affect. His speech is normal and behavior is normal.         ED Course   Procedures  Labs Reviewed   COMPREHENSIVE METABOLIC PANEL - Abnormal       Result Value    Sodium 136      Potassium 3.4 (*)     Chloride 102      CO2 27       Glucose 95      BUN 6      Creatinine 0.5      Calcium 8.3 (*)     Protein Total 6.3      Albumin 3.2 (*)     Bilirubin Total 2.5 (*)     ALP 64      AST 17      ALT 6 (*)     Anion Gap 7 (*)     eGFR >60     PROTIME-INR - Abnormal    PT 14.2 (*)     INR 1.3 (*)    CBC WITH DIFFERENTIAL - Abnormal    WBC 8.54      RBC 4.33 (*)     Hgb 13.6 (*)     Hct 38.9 (*)     MCV 90      MCH 31.4 (*)     MCHC 35.0      RDW 13.6      Platelet Count 115 (*)     MPV 10.7      Nucleated RBC 0     B-TYPE NATRIURETIC PEPTIDE (PERFORMABLE ONLY) - Abnormal     (*)    MANUAL DIFFERENTIAL - Abnormal    Segmented Neutrophil % 71.0      Lymphocyte % 18.0      Monocyte % 3.0 (*)     Eosinophil % 8.0      Platelet Estimate Decreased (*)    INFLUENZA A & B BY MOLECULAR - Normal    INFLUENZA A MOLECULAR Negative      INFLUENZA B MOLECULAR  Negative     GROUP A STREP, MOLECULAR - Normal    Group A Strep Molecular Negative     APTT - Normal    PTT 29.3     LIPASE - Normal    Lipase Level 10     TROPONIN I HIGH SENSITIVITY - Normal    Troponin High Sensitive 6.0     PROCALCITONIN - Normal    Procalcitonin <0.050     CK - Normal    CPK 38     MAGNESIUM - Normal    Magnesium 2.0     TSH - Normal    TSH 0.656     SARS-COV-2 RNA AMPLIFICATION, QUAL - Normal    SARS COV-2 Molecular Negative     CULTURE, BLOOD   CULTURE, BLOOD   CBC W/ AUTO DIFFERENTIAL    Narrative:     The following orders were created for panel order CBC auto differential.  Procedure                               Abnormality         Status                     ---------                               -----------         ------                     CBC with Differential[[id number]]       Abnormal            Final result               Manual Differential[[id number]]         Abnormal            Final result                 Please view results for these tests on the individual orders.   B-TYPE NATRIURETIC PEPTIDE    Narrative:     The following orders were created for panel order  Brain natriuretic peptide.  Procedure                               Abnormality         Status                     ---------                               -----------         ------                     BNP Performable[[id number]]             Abnormal            Final result                 Please view results for these tests on the individual orders.   HEPATITIS C ANTIBODY   HEP C VIRUS HOLD SPECIMEN   HIV 1 / 2 ANTIBODY   HIV VIRUS CONFIRMATION HOLD SPECIMEN   URINALYSIS, REFLEX TO URINE CULTURE   DRUG SCREEN PANEL, URINE EMERGENCY   URINALYSIS, REFLEX TO URINE CULTURE   LACTIC ACID, PLASMA   ISTAT LACTATE    POC Lactate 1.19      Sample VENOUS     ISTAT CREATININE    POC Creatinine 0.6      Sample VENOUS     POCT LACTATE        ECG Results              EKG 12-lead (In process)        Collection Time Result Time QRS Duration OHS QTC Calculation    07/26/25 12:03:20 07/26/25 12:25:08 84 468                     In process by Interface, Lab In [facility] (07/26/25 12:25:10)                   Narrative:    Test Reason : R06.02,    Vent. Rate :  74 BPM     Atrial Rate :  74 BPM     P-R Int : 140 ms          QRS Dur :  84 ms      QT Int : 422 ms       P-R-T Axes :  40  69  45 degrees    QTcB Int : 468 ms    Normal sinus rhythm  Normal ECG  No previous ECGs available    Referred By:            Confirmed By:                                   Imaging Results              CT Abdomen Pelvis With IV Contrast NO Oral Contrast (In process)                      CTA Chest Non-Coronary (PE Studies) (In process)                      X-Ray Chest PA And Lateral (Final result)  Result time 07/26/25 12:58:50      Final result by Markus Pinzon MD (07/26/25 12:58:50)                   Impression:      Small bilateral pleural effusions and adjacent atelectasis/consolidation, new/worse from the previous exam.      Electronically signed by: Markus Pinzon  Date:    07/26/2025  Time:    12:58               Narrative:    CLINICAL  HISTORY:  ([id number] y/o  (1975) M    Shortness of breath    TECHNIQUE:  (A#[id number], exam time 7/26/2025 12:52)    XR CHEST PA AND LATERAL IMG36    COMPARISON:  Radiograph from 08/16/2023.    FINDINGS:  Airspace opacity of both lung bases compatible with small bilateral layering pleural effusions and adjacent atelectasis/scarring (slightly worse on the right, and unchanged on the left) costophrenic angles are seen without effusion. No pneumothorax is identified. The heart is top normal in size. Osseous structures appear unchanged. The visualized upper abdomen is unchanged.                                       Medications   ceFEPIme injection 2 g (has no administration in time range)   vancomycin - pharmacy to dose (has no administration in time range)   vancomycin (VANCOCIN) 1,750 mg in 0.9% NaCl 500 mL IVPB (has no administration in time range)   sodium chloride 0.9% bolus 250 mL 250 mL (250 mLs Intravenous New Bag 7/26/25 1539)   levalbuterol nebulizer solution 1.25 mg (1.25 mg Nebulization Given 7/26/25 1550)   ipratropium 0.02 % nebulizer solution 0.5 mg (0.5 mg Nebulization Given 7/26/25 1550)   budesonide nebulizer solution 0.5 mg (0.5 mg Nebulization Given 7/26/25 1550)   iohexoL (OMNIPAQUE 350) injection 100 mL (100 mLs Intravenous Given 7/26/25 1616)     Medical Decision Making  Emergent evaluation of 50-year-old male sent from urgent care with a report of bilateral pneumonia.  Symptoms present for the past 1-2 days.  Has had subjective fever but has not checked his temperature.  Not currently demonstrating evidence of shock or sepsis.  Is requiring supplemental oxygen.  X-ray reviewed with evidence of bilateral infiltrates.  Blood cultures obtained, IV antibiotics given, labs and diagnostic imaging reviewed.  CT scans are currently pending.  Breathing treatments initiated per wheezing and rhonchi.  Will discuss with hospitalist for admission.    Amount and/or Complexity of Data Reviewed  Labs:  ordered.  Radiology: ordered.    Risk  Prescription drug management.              Attending Attestation:         Attending Critical Care:   Critical Care Times:   Direct Patient Care (initial evaluation, reassessments, and time considering the case)................................................................6 minutes.   Ordering, Reviewing, and Interpreting Diagnostic Studies...............................................................................................................5 minutes.   Documentation..................................................................................................................................................................................7 minutes.   Consultation with other Physicians. .................................................................................................................................................5 minutes.   ==============================================================  Total Critical Care Time - exclusive of procedural time: 23 minutes.  ==============================================================                                     Clinical Impression:  Final diagnoses:  [J22] Lower respiratory infection (Primary)  [R06.02] Shortness of breath  [R09.02, Z99.81] Hypoxemia requiring supplemental oxygen          ED Disposition Condition    Admit                       [1]   Social History  Tobacco Use    Smoking status: Every Day   Vaping Use    Vaping status: Never Used   Substance Use Topics    Alcohol use: Not Currently    Drug use: Not Currently        Dior Nichols MD  07/26/25 7565

## 2025-07-27 PROBLEM — I82.890 SPLENIC VEIN THROMBOSIS: Status: ACTIVE | Noted: 2025-07-27

## 2025-07-27 PROBLEM — K55.069 SUPERIOR MESENTERIC VEIN THROMBOSIS: Status: ACTIVE | Noted: 2025-07-27

## 2025-07-27 PROBLEM — I81 PORTAL VEIN THROMBOSIS: Status: ACTIVE | Noted: 2025-07-27

## 2025-07-27 PROBLEM — D69.6 THROMBOCYTOPENIA: Status: ACTIVE | Noted: 2025-07-27

## 2025-07-27 PROBLEM — J44.1 COPD EXACERBATION: Status: ACTIVE | Noted: 2025-07-27

## 2025-07-27 PROBLEM — J90 BILATERAL PLEURAL EFFUSION: Status: ACTIVE | Noted: 2025-07-27

## 2025-07-27 PROBLEM — E87.6 HYPOKALEMIA: Status: ACTIVE | Noted: 2025-07-27

## 2025-07-27 LAB
ALBUMIN SERPL-MCNC: 2.9 G/DL (ref 3.5–5.2)
ALP SERPL-CCNC: 54 UNIT/L (ref 55–135)
ALT SERPL-CCNC: 4 UNIT/L (ref 10–44)
ANION GAP (SMH): 5 MMOL/L (ref 8–16)
AST SERPL-CCNC: 15 UNIT/L (ref 10–40)
BILIRUB SERPL-MCNC: 1.7 MG/DL (ref 0.1–1)
BUN SERPL-MCNC: 5 MG/DL (ref 6–20)
CALCIUM SERPL-MCNC: 8.1 MG/DL (ref 8.7–10.5)
CHLORIDE SERPL-SCNC: 104 MMOL/L (ref 95–110)
CO2 SERPL-SCNC: 27 MMOL/L (ref 23–29)
CREAT SERPL-MCNC: 0.5 MG/DL (ref 0.5–1.4)
EOSINOPHIL NFR BLD MANUAL: 30 % (ref 0–8)
ERYTHROCYTE [DISTWIDTH] IN BLOOD BY AUTOMATED COUNT: 14 % (ref 11.5–14.5)
GFR SERPLBLD CREATININE-BSD FMLA CKD-EPI: >60 ML/MIN/1.73/M2
GLUCOSE SERPL-MCNC: 98 MG/DL (ref 70–110)
HCT VFR BLD AUTO: 36.7 % (ref 40–54)
HGB BLD-MCNC: 12.4 GM/DL (ref 14–18)
LYMPHOCYTES NFR BLD MANUAL: 9 % (ref 18–48)
MAGNESIUM SERPL-MCNC: 2 MG/DL (ref 1.6–2.6)
MCH RBC QN AUTO: 30.9 PG (ref 27–31)
MCHC RBC AUTO-ENTMCNC: 33.8 G/DL (ref 32–36)
MCV RBC AUTO: 92 FL (ref 82–98)
MONOCYTES NFR BLD MANUAL: 9 % (ref 4–15)
NEUTROPHILS NFR BLD MANUAL: 52 % (ref 38–73)
NUCLEATED RBC (/100WBC) (SMH): 0 /100 WBC
PLATELET # BLD AUTO: 101 K/UL (ref 150–450)
PLATELET BLD QL SMEAR: ABNORMAL
PMV BLD AUTO: 10.5 FL (ref 9.2–12.9)
POTASSIUM SERPL-SCNC: 3.3 MMOL/L (ref 3.5–5.1)
PROT SERPL-MCNC: 5.7 GM/DL (ref 6–8.4)
RBC # BLD AUTO: 4.01 M/UL (ref 4.6–6.2)
SODIUM SERPL-SCNC: 136 MMOL/L (ref 136–145)
WBC # BLD AUTO: 6.18 K/UL (ref 3.9–12.7)

## 2025-07-27 PROCEDURE — 96372 THER/PROPH/DIAG INJ SC/IM: CPT | Performed by: STUDENT IN AN ORGANIZED HEALTH CARE EDUCATION/TRAINING PROGRAM

## 2025-07-27 PROCEDURE — 25000242 PHARM REV CODE 250 ALT 637 W/ HCPCS

## 2025-07-27 PROCEDURE — 83735 ASSAY OF MAGNESIUM: CPT

## 2025-07-27 PROCEDURE — G0378 HOSPITAL OBSERVATION PER HR: HCPCS

## 2025-07-27 PROCEDURE — 80053 COMPREHEN METABOLIC PANEL: CPT

## 2025-07-27 PROCEDURE — 85027 COMPLETE CBC AUTOMATED: CPT

## 2025-07-27 PROCEDURE — 99900031 HC PATIENT EDUCATION (STAT)

## 2025-07-27 PROCEDURE — S4991 NICOTINE PATCH NONLEGEND: HCPCS

## 2025-07-27 PROCEDURE — 27000221 HC OXYGEN, UP TO 24 HOURS

## 2025-07-27 PROCEDURE — 99204 OFFICE O/P NEW MOD 45 MIN: CPT | Mod: ,,, | Performed by: INTERNAL MEDICINE

## 2025-07-27 PROCEDURE — 63600175 PHARM REV CODE 636 W HCPCS: Performed by: STUDENT IN AN ORGANIZED HEALTH CARE EDUCATION/TRAINING PROGRAM

## 2025-07-27 PROCEDURE — 11000001 HC ACUTE MED/SURG PRIVATE ROOM

## 2025-07-27 PROCEDURE — 25000003 PHARM REV CODE 250

## 2025-07-27 PROCEDURE — 94761 N-INVAS EAR/PLS OXIMETRY MLT: CPT

## 2025-07-27 PROCEDURE — 63600175 PHARM REV CODE 636 W HCPCS

## 2025-07-27 PROCEDURE — 99900035 HC TECH TIME PER 15 MIN (STAT)

## 2025-07-27 PROCEDURE — 94640 AIRWAY INHALATION TREATMENT: CPT | Mod: XB

## 2025-07-27 PROCEDURE — 63600175 PHARM REV CODE 636 W HCPCS: Performed by: INTERNAL MEDICINE

## 2025-07-27 PROCEDURE — 36415 COLL VENOUS BLD VENIPUNCTURE: CPT

## 2025-07-27 RX ORDER — FUROSEMIDE 10 MG/ML
40 INJECTION INTRAMUSCULAR; INTRAVENOUS 2 TIMES DAILY
Status: DISCONTINUED | OUTPATIENT
Start: 2025-07-27 | End: 2025-08-01 | Stop reason: HOSPADM

## 2025-07-27 RX ORDER — FUROSEMIDE 10 MG/ML
40 INJECTION INTRAMUSCULAR; INTRAVENOUS ONCE
Status: COMPLETED | OUTPATIENT
Start: 2025-07-27 | End: 2025-07-27

## 2025-07-27 RX ORDER — ENOXAPARIN SODIUM 100 MG/ML
1 INJECTION SUBCUTANEOUS EVERY 12 HOURS
Status: DISCONTINUED | OUTPATIENT
Start: 2025-07-27 | End: 2025-08-01 | Stop reason: HOSPADM

## 2025-07-27 RX ADMIN — FUROSEMIDE 40 MG: 10 INJECTION, SOLUTION INTRAMUSCULAR; INTRAVENOUS at 09:07

## 2025-07-27 RX ADMIN — FUROSEMIDE 40 MG: 10 INJECTION, SOLUTION INTRAMUSCULAR; INTRAVENOUS at 12:07

## 2025-07-27 RX ADMIN — BUPRENORPHINE AND NALOXONE 1 FILM: 8; 2 FILM BUCCAL; SUBLINGUAL at 09:07

## 2025-07-27 RX ADMIN — BUPRENORPHINE AND NALOXONE 1 FILM: 8; 2 FILM BUCCAL; SUBLINGUAL at 08:07

## 2025-07-27 RX ADMIN — OXCARBAZEPINE 300 MG: 150 TABLET, FILM COATED ORAL at 08:07

## 2025-07-27 RX ADMIN — OLANZAPINE 20 MG: 5 TABLET, FILM COATED ORAL at 09:07

## 2025-07-27 RX ADMIN — IPRATROPIUM BROMIDE AND ALBUTEROL SULFATE 3 ML: 2.5; .5 SOLUTION RESPIRATORY (INHALATION) at 08:07

## 2025-07-27 RX ADMIN — PANTOPRAZOLE SODIUM 40 MG: 40 TABLET, DELAYED RELEASE ORAL at 05:07

## 2025-07-27 RX ADMIN — OXCARBAZEPINE 300 MG: 150 TABLET, FILM COATED ORAL at 09:07

## 2025-07-27 RX ADMIN — FLUOXETINE HYDROCHLORIDE 20 MG: 20 CAPSULE ORAL at 08:07

## 2025-07-27 RX ADMIN — NICOTINE 1 PATCH: 21 PATCH, EXTENDED RELEASE TRANSDERMAL at 08:07

## 2025-07-27 RX ADMIN — CLONIDINE HYDROCHLORIDE 0.1 MG: 0.1 TABLET ORAL at 10:07

## 2025-07-27 RX ADMIN — IPRATROPIUM BROMIDE AND ALBUTEROL SULFATE 3 ML: 2.5; .5 SOLUTION RESPIRATORY (INHALATION) at 02:07

## 2025-07-27 RX ADMIN — ENOXAPARIN SODIUM 80 MG: 80 INJECTION SUBCUTANEOUS at 05:07

## 2025-07-27 RX ADMIN — PREDNISONE 40 MG: 20 TABLET ORAL at 08:07

## 2025-07-27 NOTE — PROGRESS NOTES
[facility] Medicine  Progress Note    Patient Name: Christiano Gomez Jr.  MRN: [id number]  Patient Class: OP- Observation   Admission Date: 7/26/2025  Length of Stay: 0 days  Attending Physician: Deejay Lund MD  Primary Care Provider: Viki, Provider        Subjective     Principal Problem:Acute respiratory failure        HPI:  This is a pleasant 50-year-old gentleman with comorbid conditions of cirrhosis, mood disorder who presents to the emergency department with complaints of fatigue, wheezing and dyspnea.  Patient found to be markedly wheezy on admission as well as hypoxic requiring 2 L nasal cannula.  Imaging showing bilateral pleural effusions and infiltrate.  He is initiated on therapy for community-acquired pneumonia and COPD exacerbation with antibiotics, steroids and nebulizers.  Admission to Hospital Medicine.    Overview/Hospital Course:  50-year-old male with PMH per HPI is admitted due to acute hypoxic respiratory failure attributed to COPD exacerbation and a large right pleural effusion.  Also has a small left pleural effusion.  He required nasal cannula oxygen support.  Also found to have thrombosis in the portal vein, splenic vein, and superior mesenteric vein.  Initially given antibiotics for concern for pneumonia but this was ruled out and antibiotics stopped with CT imaging showing more likely atelectasis or scarring.  Treated with nebulized breathing treatments and corticosteroids.  Started on therapeutic Lovenox.  Consults to pulmonology and vascular surgery.  Vascular surgery recommended anticoagulation for his thromboses without other intervention.    Interval History:  Seen on a.m. rounds.  Difficulty breathing is similar.  Denies any new abdominal symptoms.      Objective:     Vital Signs (Most Recent):  Temp: 97.5 °F (36.4 °C) (07/27/25 1129)  Pulse: (!) 54 (07/27/25 1129)  Resp: 19 (07/27/25 1129)  BP: 119/81 (07/27/25 1129)  SpO2: (!) 94 % (07/27/25 1129)  Vital Signs (24h Range):  Temp:  [97.5 °F (36.4 °C)-98 °F (36.7 °C)] 97.5 °F (36.4 °C)  Pulse:  [51-76] 54  Resp:  [14-24] 19  SpO2:  [94 %-96 %] 94 %  BP: ()/(58-81) 119/81     Weight: 81.1 kg (178 lb 12.7 oz)  Body mass index is 27.19 kg/m².    Intake/Output Summary (Last 24 hours) at 7/27/2025 1215  Last data filed at 7/27/2025 0557  Gross per 24 hour   Intake 863.13 ml   Output 300 ml   Net 563.13 ml         Physical Exam  Vitals reviewed.   Constitutional:       General: He is not in acute distress.  HENT:      Head: Normocephalic and atraumatic.   Cardiovascular:      Rate and Rhythm: Normal rate and regular rhythm.   Pulmonary:      Effort: Pulmonary effort is normal. No respiratory distress.      Breath sounds: Wheezing present. No rhonchi or rales.      Comments: Decreased breath sounds right mid to lower lung field and at left base  Skin:     General: Skin is warm and dry.      Comments: Clubbed fingers  Hyperpigmented skin changes distal BLE   Neurological:      General: No focal deficit present.      Mental Status: He is alert and oriented to person, place, and time. Mental status is at baseline.   Psychiatric:         Mood and Affect: Affect normal.         Behavior: Behavior normal.               Significant Labs: All pertinent labs within the past 24 hours have been reviewed.    Significant Imaging: I have reviewed all pertinent imaging results/findings within the past 24 hours.      Assessment & Plan  Acute respiratory failure  Patient with Hypoxic Respiratory failure which is Acute.  he is not on home oxygen. Supplemental oxygen was provided and noted- Oxygen Concentration (%):  [32] 32    Signs/symptoms of respiratory failure include- wheezing and hypoxia. Contributing diagnoses includes - COPD and Pleural effusion Labs and images were reviewed. Patient Has recent ABG, which has been reviewed. Will treat underlying causes and adjust management of respiratory failure as follows-  steroids,  nebulizers, pulmonary consult  Bipolar affective disorder  Stable   -continue home olanzapine, clonidine, and fluoxetine  Seizures  Stable  -continue home oxcarbazepine  Polysubstance abuse  Stable   -continue home Suboxone  Cirrhosis  Patient with known Cirrhosis with Child's class B. Co-morbidities are present and inclusive of portal hypertension, esophageal varices, portal venous thrombosis, malnutrition, and anemia/thrombocytopenia.  MELD-Na score calculated; MELD 3.0: 13 at 7/27/2025  6:12 AM  MELD-Na: 11 at 7/27/2025  6:12 AM  Calculated from:  Serum Creatinine: 0.5 mg/dL (Using min of 1 mg/dL) at 7/27/2025  6:12 AM  Serum Sodium: 136 mmol/L at 7/27/2025  6:12 AM  Total Bilirubin: 1.7 mg/dL at 7/27/2025  6:12 AM  Serum Albumin: 2.9 g/dL at 7/27/2025  6:12 AM  INR(ratio): 1.3 at 7/26/2025  2:37 PM  Age at listing (hypothetical): 50 years  Sex: Male at 7/27/2025  6:12 AM    Continue chronic meds. Etiology likely Hepatitis C which was cured. Will avoid any hepatotoxic meds, and monitor CBC/CMP/INR for synthetic function.  Continue home lasix. Follows with hepatology and has had recent EGD  Bilateral pleural effusion  Large on right.  Small on left.  -pulmonary consult, discussed with them may try diuresis 1st.  This maybe limited with his low blood pressure  -trial Lasix 40 mg IV x1  -repeat CXR in a.m.  Portal vein thrombosis  Splenic vein thrombosis  Superior mesenteric vein thrombosis  Seen on CT imaging.  Generally asymptomatic  -vascular surgery consult, recommended anticoagulation  -start therapeutic Lovenox now.  Likely DOAC on discharge  -monitor platelets  COPD exacerbation  Patient's COPD is with exacerbation noted by worsening of baseline hypoxia and wheezing currently.  Patient is currently off COPD Pathway. Continue scheduled inhalers, Steroids and Supplemental oxygen and monitor respiratory status closely.   Thrombocytopenia  The likely etiology of thrombocytopenia is liver disease. The patients 3  most recent labs are listed below.  Recent Labs     07/26/25  1437 07/27/25  0612   * 101*     Plan  - Will transfuse if platelet count is <10k or if less than 50 K with active bleeding or planned procedure.  - CBC daily  - monitor close while on therapeutic Lovenox  Hypokalemia  Patient's most recent potassium results are listed below.   Recent Labs     07/26/25  1437 07/27/25  0612   K 3.4* 3.3*     Plan  - Replete potassium per protocol  - Monitor potassium Daily  - Patient's hypokalemia is stable    VTE Risk Mitigation (From admission, onward)           Ordered     enoxaparin injection 80 mg  Every 12 hours         07/27/25 1132     IP VTE HIGH RISK PATIENT  Once         07/26/25 1721     Place sequential compression device  Until discontinued         07/26/25 1721                    Discharge Planning   SHAKIRA:  7/29    Code Status: Full Code   Medical Readiness for Discharge Date:   Discharge Plan A: Home with family                   Deejay Lund MD  Department of Hospital Medicine   [facility]

## 2025-07-27 NOTE — PLAN OF CARE
Problem: COPD (Chronic Obstructive Pulmonary Disease)  Goal: Optimal Chronic Illness Coping  Outcome: Progressing  Goal: Optimal Level of Functional [place]  Outcome: Progressing  Goal: Absence of Infection Signs and Symptoms  Outcome: Progressing  Goal: Improved Oral Intake  Outcome: Progressing  Goal: Effective Oxygenation and Ventilation  Outcome: Progressing     Problem: Adult Inpatient Plan of Care  Goal: Plan of Care Review  Outcome: Progressing  Goal: Patient-Specific Goal (Individualized)  Outcome: Progressing  Goal: Absence of Hospital-Acquired Illness or Injury  Outcome: Progressing  Goal: Optimal Comfort and Wellbeing  Outcome: Progressing  Goal: Readiness for Transition of Care  Outcome: Progressing

## 2025-07-27 NOTE — PLAN OF CARE
Problem: COPD (Chronic Obstructive Pulmonary Disease)  Goal: Absence of Infection Signs and Symptoms  Outcome: Progressing  Goal: Effective Oxygenation and Ventilation  Outcome: Progressing     Problem: Pneumonia  Goal: Resolution of Infection Signs and Symptoms  Outcome: Progressing  Goal: Effective Oxygenation and Ventilation  Outcome: Progressing     Problem: Adult Inpatient Plan of Care  Goal: Patient-Specific Goal (Individualized)  Outcome: Progressing  Goal: Absence of Hospital-Acquired Illness or Injury  Outcome: Progressing

## 2025-07-27 NOTE — ASSESSMENT & PLAN NOTE
Patient has a diagnosis of pneumonia. The cause of the pneumonia is suspected to be bacterial in etiology but organism is not known. The pneumonia is stable. The patient has the following signs/symptoms of pneumonia: persistent hypoxia , cough, and shortness of breath. The patient does have a current oxygen requirement and the patient does not have a home oxygen requirement.    Current antimicrobial regimen consists of the antibiotics listed below. Will monitor patient closely and continue current treatment plan unchanged.    Antibiotics (From admission, onward)      Start     Stop Route Frequency Ordered    07/26/25 2000  azithromycin (ZITHROMAX) 500 mg in 0.9% NaCl 250 mL IVPB (admixture device)         07/29/25 1959 IV Every 24 hours (non-standard times) 07/26/25 1847 07/26/25 0000  cefTRIAXone injection 1 g         -- IV Every 24 hours (non-standard times) 07/26/25 1842            Microbiology Results (last 7 days)       Procedure Component Value Units Date/Time    Influenza A & B by Molecular [[id number]]  (Normal) Collected: 07/26/25 1444    Order Status: Completed Specimen: Nasal Swab Updated: 07/26/25 1534     INFLUENZA A MOLECULAR Negative     INFLUENZA B MOLECULAR  Negative    Group A Strep, Molecular [[id number]]  (Normal) Collected: 07/26/25 1444    Order Status: Completed Specimen: Throat Updated: 07/26/25 1516     Group A Strep Molecular Negative    Blood culture x two cultures. Draw prior to antibiotics. [[id number]] Collected: 07/26/25 1502    Order Status: Sent Specimen: Blood from Peripheral, Hand, Left Updated: 07/26/25 1507    Blood culture x two cultures. Draw prior to antibiotics. [[id number]] Collected: 07/26/25 1453    Order Status: Sent Specimen: Blood from Peripheral, Forearm, Right Updated: 07/26/25 1507

## 2025-07-27 NOTE — ASSESSMENT & PLAN NOTE
Seen on CT imaging.  Generally asymptomatic  -vascular surgery consult, recommended anticoagulation  -start therapeutic Lovenox now.  Likely DOAC on discharge  -monitor platelets

## 2025-07-27 NOTE — SUBJECTIVE & OBJECTIVE
Interval History:  Seen on a.m. rounds.  Difficulty breathing is similar.  Denies any new abdominal symptoms.      Objective:     Vital Signs (Most Recent):  Temp: 97.5 °F (36.4 °C) (07/27/25 1129)  Pulse: (!) 54 (07/27/25 1129)  Resp: 19 (07/27/25 1129)  BP: 119/81 (07/27/25 1129)  SpO2: (!) 94 % (07/27/25 1129) Vital Signs (24h Range):  Temp:  [97.5 °F (36.4 °C)-98 °F (36.7 °C)] 97.5 °F (36.4 °C)  Pulse:  [51-76] 54  Resp:  [14-24] 19  SpO2:  [94 %-96 %] 94 %  BP: ()/(58-81) 119/81     Weight: 81.1 kg (178 lb 12.7 oz)  Body mass index is 27.19 kg/m².    Intake/Output Summary (Last 24 hours) at 7/27/2025 1215  Last data filed at 7/27/2025 0557  Gross per 24 hour   Intake 863.13 ml   Output 300 ml   Net 563.13 ml         Physical Exam  Vitals reviewed.   Constitutional:       General: He is not in acute distress.  HENT:      Head: Normocephalic and atraumatic.   Cardiovascular:      Rate and Rhythm: Normal rate and regular rhythm.   Pulmonary:      Effort: Pulmonary effort is normal. No respiratory distress.      Breath sounds: Wheezing present. No rhonchi or rales.      Comments: Decreased breath sounds right mid to lower lung field and at left base  Skin:     General: Skin is warm and dry.      Comments: Clubbed fingers  Hyperpigmented skin changes distal BLE   Neurological:      General: No focal deficit present.      Mental Status: He is alert and oriented to person, place, and time. Mental status is at baseline.   Psychiatric:         Mood and Affect: Affect normal.         Behavior: Behavior normal.               Significant Labs: All pertinent labs within the past 24 hours have been reviewed.    Significant Imaging: I have reviewed all pertinent imaging results/findings within the past 24 hours.

## 2025-07-27 NOTE — PROGRESS NOTES
[facility]  Progress Note    Patient Name: Christiano Gomez Jr.  MRN: [id number]  Patient Class: OP- Observation   Admission Date: 7/26/2025  Length of Stay: 0 days  Attending Physician: Valerie Heath MD  Primary Care Provider: Viki Provider        Subjective     Principal Problem:Acute respiratory failure        HPI:  This is a pleasant 50-year-old gentleman with comorbid conditions of cirrhosis, mood disorder who presents to the emergency department with complaints of fatigue, wheezing and dyspnea.  Patient found to be markedly wheezy on admission as well as hypoxic requiring 2 L nasal cannula.  Imaging showing bilateral pleural effusions and infiltrate.  He is initiated on therapy for community-acquired pneumonia and COPD exacerbation with antibiotics, steroids and nebulizers.  Admission to Hospital Medicine.    Overview/Hospital Course:  No notes on file    Past Medical History:   Diagnosis Date    Bipolar disorder     Cirrhosis of liver     Hepatitis C virus infection cured after antiviral drug therapy     treated / cured (SVR 6/2024)    OD (overdose of drug)     Seizures        Past Surgical History:   Procedure Laterality Date    ESOPHAGOGASTRODUODENOSCOPY N/A 6/3/2025    Procedure: EGD (ESOPHAGOGASTRODUODENOSCOPY);  Surgeon: Tony Tinajero MD;  Location: [facility];  Service: Endoscopy;  Laterality: N/A;  ppm       Review of patient's allergies indicates:  No Known Allergies    No current facility-administered medications on file prior to encounter.     Current Outpatient Medications on File Prior to Encounter   Medication Sig    albuterol-ipratropium (DUO-NEB) 2.5 mg-0.5 mg/3 mL nebulizer solution Take 3 mLs by nebulization every 6 (six) hours as needed for Wheezing. Rescue    buprenorphine-naloxone 8-2 (SUBOXONE) 8-2 mg Subl Place 1 tablet under the tongue 2 (two) times daily.    clonazePAM (KLONOPIN) 1 MG tablet Take 1 mg by mouth 2 (two) times daily as needed  for Anxiety.    cloNIDine (CATAPRES) 0.1 MG tablet Take 0.1 mg by mouth every evening.    FLUoxetine 20 MG capsule Take 20 mg by mouth once daily.    furosemide (LASIX) 20 MG tablet Take 2 tablets (40 mg total) by mouth once daily. (Patient taking differently: Take 20 mg by mouth once daily.)    OLANZapine (ZYPREXA) 20 MG tablet Take 1 tablet by mouth every evening.    OXcarbazepine (TRILEPTAL) 300 MG Tab Take 300 mg by mouth 2 (two) times daily.    pantoprazole (PROTONIX) 40 MG tablet Take 1 tablet (40 mg total) by mouth once daily.    NARCAN 4 mg/actuation Spry 1 spray in 1 nostril as needed for opioid overdose; may repeat 1 spray in alternating nostrils every 2-3 minutes as needed until patient is responsive or EMS arrives    [DISCONTINUED] albuterol (PROVENTIL/VENTOLIN HFA) 90 mcg/actuation inhaler Inhale 1 puff into the lungs every 4 to 6 hours as needed.    [DISCONTINUED] hepatitis A and B vaccine, PF, (TWINRIX) 720 ADRIAN unit- 20 mcg/mL Syrg suspension Inject 1mL IM at 0, 1, and 6 months    [DISCONTINUED] ondansetron (ZOFRAN-ODT) 4 MG TbDL Take 1 tablet (4 mg total) by mouth every 6 (six) hours as needed (Nausea).    [DISCONTINUED] spironolactone (ALDACTONE) 50 MG tablet Take 2 tablets (100 mg total) by mouth once daily.    [DISCONTINUED] VITAMIN D2 1,250 mcg (50,000 unit) capsule Take 50,000 Units by mouth every 7 days.     Family History    None       Tobacco Use    Smoking status: Every Day    Smokeless tobacco: Not on file   Vaping Use    Vaping status: Never Used   Substance and Sexual Activity    Alcohol use: Not Currently    Drug use: Not Currently    Sexual activity: Yes     Partners: Female     Review of Systems   Constitutional:  Positive for fatigue.   Respiratory:  Positive for cough, shortness of breath and wheezing.    Gastrointestinal:  Negative for abdominal pain.   Psychiatric/Behavioral:  Negative for agitation and confusion.      Objective:     Vital Signs (Most Recent):  Temp: 98 °F (36.7  °C) (07/26/25 1954)  Pulse: 65 (07/26/25 1954)  Resp: 16 (07/26/25 1954)  BP: 101/65 (07/26/25 1954)  SpO2: 96 % (07/26/25 1954) Vital Signs (24h Range):  Temp:  [97.9 °F (36.6 °C)-98.1 °F (36.7 °C)] 98 °F (36.7 °C)  Pulse:  [65-76] 65  Resp:  [14-24] 16  SpO2:  [92 %-96 %] 96 %  BP: (101-122)/(65-78) 101/65     Weight: 81.1 kg (178 lb 12.7 oz)  Body mass index is 27.19 kg/m².     Physical Exam  HENT:      Head: Atraumatic.   Eyes:      Extraocular Movements: Extraocular movements intact.   Cardiovascular:      Rate and Rhythm: Normal rate.   Pulmonary:      Effort: Pulmonary effort is normal.      Breath sounds: Wheezing and rhonchi present.   Abdominal:      General: There is no distension.      Palpations: Abdomen is soft.      Tenderness: There is no abdominal tenderness.   Skin:     General: Skin is warm and dry.   Neurological:      General: No focal deficit present.      Mental Status: He is alert and oriented to person, place, and time.                Significant Labs: All pertinent labs within the past 24 hours have been reviewed.  BMP:   Recent Labs   Lab 07/26/25  1437   GLU 95      K 3.4*      CO2 27   BUN 6   CREATININE 0.5   CALCIUM 8.3*   MG 2.0     CBC:   Recent Labs   Lab 07/26/25  1437   WBC 8.54   HGB 13.6*   HCT 38.9*   *       Significant Imaging: I have reviewed all pertinent imaging results/findings within the past 24 hours.      Assessment & Plan  Acute respiratory failure  Patient with Hypoxic Respiratory failure which is Acute.  he is not on home oxygen. Supplemental oxygen was provided and noted-      .   Signs/symptoms of respiratory failure include- tachypnea and wheezing. Contributing diagnoses includes - COPD and Pneumonia Labs and images were reviewed. Patient Has recent ABG, which has been reviewed. Will treat underlying causes and adjust management of respiratory failure as follows- antibiotics, steroids, nebulizers  Bipolar affective disorder  Home Olanzapine  ordered    Seizures    Home Oxcarbazepine ordered  Polysubstance abuse  Home Suboxone ordered    Cirrhosis  Patient with known Cirrhosis with Child's class B. Co-morbidities are present and inclusive of malnutrition and anemia/pancytopenia.  MELD-Na score calculated; MELD 3.0: 14 at 7/26/2025  2:37 PM  MELD-Na: 14 at 7/26/2025  2:37 PM  Calculated from:  Serum Creatinine: 0.5 mg/dL (Using min of 1 mg/dL) at 7/26/2025  2:37 PM  Serum Sodium: 136 mmol/L at 7/26/2025  2:37 PM  Total Bilirubin: 2.5 mg/dL at 7/26/2025  2:37 PM  Serum Albumin: 3.2 g/dL at 7/26/2025  2:37 PM  INR(ratio): 1.3 at 7/26/2025  2:37 PM  Age at listing (hypothetical): 50 years  Sex: Male at 7/26/2025  2:37 PM      Continue chronic meds. Etiology likely Hepatitis. Will avoid any hepatotoxic meds, and monitor CBC/CMP/INR for synthetic function. Resume home lasix    Follows with hepatology and has had recent EGD  Community acquired pneumonia  Patient has a diagnosis of pneumonia. The cause of the pneumonia is suspected to be bacterial in etiology but organism is not known. The pneumonia is stable. The patient has the following signs/symptoms of pneumonia: persistent hypoxia , cough, and shortness of breath. The patient does have a current oxygen requirement and the patient does not have a home oxygen requirement.    Current antimicrobial regimen consists of the antibiotics listed below. Will monitor patient closely and continue current treatment plan unchanged.    Antibiotics (From admission, onward)      Start     Stop Route Frequency Ordered    07/26/25 2000  azithromycin (ZITHROMAX) 500 mg in 0.9% NaCl 250 mL IVPB (admixture device)         07/29/25 1959 IV Every 24 hours (non-standard times) 07/26/25 1847 07/26/25 0000  cefTRIAXone injection 1 g         -- IV Every 24 hours (non-standard times) 07/26/25 1842            Microbiology Results (last 7 days)       Procedure Component Value Units Date/Time    Influenza A & B by Molecular  [[id number]]  (Normal) Collected: 07/26/25 1444    Order Status: Completed Specimen: Nasal Swab Updated: 07/26/25 1534     INFLUENZA A MOLECULAR Negative     INFLUENZA B MOLECULAR  Negative    Group A Strep, Molecular [[id number]]  (Normal) Collected: 07/26/25 1444    Order Status: Completed Specimen: Throat Updated: 07/26/25 1516     Group A Strep Molecular Negative    Blood culture x two cultures. Draw prior to antibiotics. [[id number]] Collected: 07/26/25 1502    Order Status: Sent Specimen: Blood from Peripheral, Hand, Left Updated: 07/26/25 1507    Blood culture x two cultures. Draw prior to antibiotics. [[id number]] Collected: 07/26/25 1453    Order Status: Sent Specimen: Blood from Peripheral, Forearm, Right Updated: 07/26/25 1507          VTE Risk Mitigation (From admission, onward)           Ordered     enoxaparin injection 40 mg  Daily         07/26/25 1721     IP VTE HIGH RISK PATIENT  Once         07/26/25 1721     Place sequential compression device  Until discontinued         07/26/25 1721                    Discharge Planning   SHAKIRA:      Code Status: Full Code   Medical Readiness for Discharge Date:                                  Valerie Heath MD  Department of Hospital Medicine   [facility]

## 2025-07-27 NOTE — NURSING
Pt arrived to floor. Tele box verified. IV checked and handoff given to Chyna BARNES. Pt in stable condition and call light/urinal in reach.

## 2025-07-27 NOTE — HPI
This is a pleasant 50-year-old gentleman with comorbid conditions of cirrhosis, mood disorder who presents to the emergency department with complaints of fatigue, wheezing and dyspnea.  Patient found to be markedly wheezy on admission as well as hypoxic requiring 2 L nasal cannula.  Imaging showing bilateral pleural effusions and infiltrate.  He is initiated on therapy for community-acquired pneumonia and COPD exacerbation with antibiotics, steroids and nebulizers.  Admission to Hospital Medicine.

## 2025-07-27 NOTE — ASSESSMENT & PLAN NOTE
The likely etiology of thrombocytopenia is liver disease. The patients 3 most recent labs are listed below.  Recent Labs     07/26/25  1437 07/27/25  0612   * 101*     Plan  - Will transfuse if platelet count is <10k or if less than 50 K with active bleeding or planned procedure.  - CBC daily  - monitor close while on therapeutic Lovenox

## 2025-07-27 NOTE — PLAN OF CARE
[facility]  Initial Discharge Assessment  Assessment was completed at bedside with Pt and his aunt.  Cm verified demographic, insurance and pharmacy. Pt denies any in home services, DME's, dialysis or coumadin clinics. Pt states his POA is   his mother. Pt plans to discharge home. At this time Pt has no discharge needs.        Primary Care Provider: Viki Provider    Admission Diagnosis: Lower respiratory infection [J22]    Admission Date: 7/26/2025  Expected Discharge Date:          Payor: MEDICAID / Plan: [employer] / Product Type: Managed Medicaid /     Extended Emergency Contact Information  Primary Emergency Contact: Jacy Gomez  Mobile Phone: [phone]  Relation: Mother    Discharge Plan A: (P) Home with family  Discharge Plan B: (P) Home      [facility] - [place] - [address]  [address] [place] [address]  Phone: [phone] Fax: [phone]      Initial Assessment (most recent)       Adult Discharge Assessment - 07/27/25 1129          Discharge Assessment    Assessment Type Discharge Planning Assessment (P)      Confirmed/corrected address, phone number and insurance Yes (P)      Confirmed Demographics Correct on Facesheet (P)      Source of Information patient;family (P)      People in Home parent(s) (P)      Do you expect to return to your current living situation? Yes (P)      Do you have help at home or someone to help you manage your care at home? Yes (P)      Who are your caregiver(s) and their phone number(s)? mj Patricia [phone] (P)      Prior to hospitilization cognitive status: Alert/Oriented (P)      Current cognitive status: Alert/Oriented (P)      Walking or Climbing Stairs Difficulty no (P)      Dressing/Bathing Difficulty no (P)      Home Accessibility wheelchair accessible (P)      Home Layout Able to live on 1st floor (P)      Equipment Currently Used at Home none (P)      Patient currently being followed by  outpatient case management? No (P)      Do you currently have service(s) that help you manage your care at home? No (P)      Do you take prescription medications? Yes (P)      Do you have prescription coverage? Yes (P)      Do you have any problems affording any of your prescribed medications? TBD (P)      Is the patient taking medications as prescribed? yes (P)      Who is going to help you get home at discharge? nat Gomez (P)      How do you get to doctors appointments? family or friend will provide (P)      Are you on dialysis? No (P)      Do you take coumadin? No (P)      Discharge Plan A Home with family (P)      Discharge Plan B Home (P)      DME Needed Upon Discharge  none (P)      Discharge Plan discussed with: Patient (P)

## 2025-07-27 NOTE — PLAN OF CARE
07/27/25 0802   Patient Assessment/Suction   Level of Consciousness (AVPU) alert   Respiratory Effort Normal;Unlabored   Expansion/Accessory Muscles/Retractions no use of accessory muscles   All Lung Fields Breath Sounds Anterior:;Lateral:;diminished   JARAD Breath Sounds diminished   LLL Breath Sounds diminished   RUL Breath Sounds diminished   RML Breath Sounds diminished   RLL Breath Sounds diminished   Rhythm/Pattern, Respiratory pattern regular   Cough Frequency infrequent   Cough Type nonproductive   Skin Integrity   $ Wound Care Tech Time 15 min   Area Observed Left;Right;Behind ear;Cheek;Upper lip;Nares   Skin Appearance without discoloration   PRE-TX-O2   Device (Oxygen Therapy) nasal cannula   $ Is the patient on Low Flow Oxygen? Yes   Flow (L/min) (Oxygen Therapy) 3   Oxygen Concentration (%) 32   SpO2 (!) 94 %   Pulse Oximetry Type Intermittent   $ Pulse Oximetry - Multiple Charge Pulse Oximetry - Multiple   Pulse (!) 55   Resp 18   Aerosol Therapy   $ Aerosol Therapy Charges Aerosol Treatment  (duoneb)   Daily Review of Necessity (SVN) completed   Respiratory Treatment Status (SVN) given   Treatment Route (SVN) mask;oxygen   Patient Position HOB elevated   Post Treatment Assessment (SVN) breath sounds improved   Signs of Intolerance (SVN) none   Breath Sounds Post-Respiratory Treatment   Throughout All Fields Post-Treatment All Fields   Throughout All Fields Post-Treatment aeration increased   Post-treatment Heart Rate (beats/min) 60   Post-treatment Resp Rate (breaths/min) 18   General Safety Checklist   Safety Promotion/Fall Prevention side rails raised   Equipment Change   $ RT Equipment Aerosol treatment mask;Treatment nebulizer   Respiratory Interventions   Cough And Deep Breathing done with encouragement   Ready to Wean/Extubation Screen   FIO2<=50 (chart decimal) 0.32   Education   $ Education Bronchodilator;Oxygen;15 min

## 2025-07-27 NOTE — SUBJECTIVE & OBJECTIVE
Past Medical History:   Diagnosis Date    Bipolar disorder     Cirrhosis of liver     Hepatitis C virus infection cured after antiviral drug therapy     treated / cured (SVR 6/2024)    OD (overdose of drug)     Seizures        Past Surgical History:   Procedure Laterality Date    ESOPHAGOGASTRODUODENOSCOPY N/A 6/3/2025    Procedure: EGD (ESOPHAGOGASTRODUODENOSCOPY);  Surgeon: Tony Tinajero MD;  Location: [facility];  Service: Endoscopy;  Laterality: N/A;  ppm       Review of patient's allergies indicates:  No Known Allergies    No current facility-administered medications on file prior to encounter.     Current Outpatient Medications on File Prior to Encounter   Medication Sig    albuterol-ipratropium (DUO-NEB) 2.5 mg-0.5 mg/3 mL nebulizer solution Take 3 mLs by nebulization every 6 (six) hours as needed for Wheezing. Rescue    buprenorphine-naloxone 8-2 (SUBOXONE) 8-2 mg Subl Place 1 tablet under the tongue 2 (two) times daily.    clonazePAM (KLONOPIN) 1 MG tablet Take 1 mg by mouth 2 (two) times daily as needed for Anxiety.    cloNIDine (CATAPRES) 0.1 MG tablet Take 0.1 mg by mouth every evening.    FLUoxetine 20 MG capsule Take 20 mg by mouth once daily.    furosemide (LASIX) 20 MG tablet Take 2 tablets (40 mg total) by mouth once daily. (Patient taking differently: Take 20 mg by mouth once daily.)    OLANZapine (ZYPREXA) 20 MG tablet Take 1 tablet by mouth every evening.    OXcarbazepine (TRILEPTAL) 300 MG Tab Take 300 mg by mouth 2 (two) times daily.    pantoprazole (PROTONIX) 40 MG tablet Take 1 tablet (40 mg total) by mouth once daily.    NARCAN 4 mg/actuation Spry 1 spray in 1 nostril as needed for opioid overdose; may repeat 1 spray in alternating nostrils every 2-3 minutes as needed until patient is responsive or EMS arrives    [DISCONTINUED] albuterol (PROVENTIL/VENTOLIN HFA) 90 mcg/actuation inhaler Inhale 1 puff into the lungs every 4 to 6 hours as needed.    [DISCONTINUED] hepatitis A and B  vaccine, PF, (TWINRIX) 720 ADRIAN unit- 20 mcg/mL Syrg suspension Inject 1mL IM at 0, 1, and 6 months    [DISCONTINUED] ondansetron (ZOFRAN-ODT) 4 MG TbDL Take 1 tablet (4 mg total) by mouth every 6 (six) hours as needed (Nausea).    [DISCONTINUED] spironolactone (ALDACTONE) 50 MG tablet Take 2 tablets (100 mg total) by mouth once daily.    [DISCONTINUED] VITAMIN D2 1,250 mcg (50,000 unit) capsule Take 50,000 Units by mouth every 7 days.     Family History    None       Tobacco Use    Smoking status: Every Day    Smokeless tobacco: Not on file   Vaping Use    Vaping status: Never Used   Substance and Sexual Activity    Alcohol use: Not Currently    Drug use: Not Currently    Sexual activity: Yes     Partners: Female     Review of Systems   Constitutional:  Positive for fatigue.   Respiratory:  Positive for cough, shortness of breath and wheezing.    Cardiovascular:  Negative for chest pain.   Gastrointestinal:  Negative for abdominal pain.   Neurological:  Positive for weakness.   Psychiatric/Behavioral:  Negative for agitation and confusion.      Objective:     Vital Signs (Most Recent):  Temp: 98 °F (36.7 °C) (07/26/25 1954)  Pulse: 65 (07/26/25 1954)  Resp: 16 (07/26/25 1954)  BP: 101/65 (07/26/25 1954)  SpO2: 96 % (07/26/25 1954) Vital Signs (24h Range):  Temp:  [97.9 °F (36.6 °C)-98.1 °F (36.7 °C)] 98 °F (36.7 °C)  Pulse:  [65-76] 65  Resp:  [14-24] 16  SpO2:  [92 %-96 %] 96 %  BP: (101-122)/(65-78) 101/65     Weight: 81.1 kg (178 lb 12.7 oz)  Body mass index is 27.19 kg/m².     Physical Exam  Eyes:      Extraocular Movements: Extraocular movements intact.   Cardiovascular:      Rate and Rhythm: Normal rate.   Pulmonary:      Effort: Pulmonary effort is normal. No respiratory distress.      Breath sounds: Wheezing and rhonchi present.   Abdominal:      Palpations: Abdomen is soft.      Tenderness: There is no abdominal tenderness.   Neurological:      General: No focal deficit present.      Mental Status: He is  alert and oriented to person, place, and time.   Psychiatric:         Mood and Affect: Mood normal.         Behavior: Behavior normal.                Significant Labs: All pertinent labs within the past 24 hours have been reviewed.  BMP:   Recent Labs   Lab 07/26/25  1437   GLU 95      K 3.4*      CO2 27   BUN 6   CREATININE 0.5   CALCIUM 8.3*   MG 2.0     CBC:   Recent Labs   Lab 07/26/25  1437   WBC 8.54   HGB 13.6*   HCT 38.9*   *       Significant Imaging: I have reviewed all pertinent imaging results/findings within the past 24 hours.

## 2025-07-27 NOTE — CONSULTS
Pulmonary/Critical Care Consult      Patient name: Christiano Gomez Jr.  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 He notes SOB worse w/ exertion getting worse. He feels nebulized albuterol is helpful. Pt takes lasix and spironolactone at home, sees hepatology. He has cough/tan mucous and wheezing off and on. He is a smoker, about 25 yrs. Has never had paracentesis or thoracentesis. With R pleural effusion on imaging and CT abd/p with Partially occlusive portal vein thrombus is present. Occlusive splenic vein thrombus and partially occlusive thrombus is present in the superior mesenteric vein.     Review of Systems    Review of Systems   Constitutional: Negative.    HENT: Negative.     Eyes: Negative.    Respiratory:  Positive for cough, sputum production and shortness of breath.    Cardiovascular:  Positive for leg swelling. Negative for chest pain, palpitations, orthopnea and PND.   Gastrointestinal:  Positive for abdominal pain.   Genitourinary: Negative.    Musculoskeletal: Negative.    Skin: Negative.    Neurological: Negative.    Endo/Heme/Allergies: Negative.    Psychiatric/Behavioral: Negative.         Past Medical History    Past Medical History:   Diagnosis Date    Bipolar disorder     Cirrhosis of liver     Hepatitis C virus infection cured after antiviral drug therapy     treated / cured (SVR 6/2024)    OD (overdose of drug)     Seizures        Past Surgical History    Past Surgical History:   Procedure Laterality Date    ESOPHAGOGASTRODUODENOSCOPY N/A 6/3/2025    Procedure: EGD (ESOPHAGOGASTRODUODENOSCOPY);  Surgeon: Tony Tinajero MD;  Location: [facility];  Service: Endoscopy;  Laterality: N/A;  ppm       Medications (scheduled):      albuterol-ipratropium  3 mL Nebulization Q6H WAKE     buprenorphine-naloxone 8-2 mg  1 Film Sublingual BID    cloNIDine  0.1 mg Oral QHS    enoxparin  40 mg Subcutaneous Daily    FLUoxetine  20 mg Oral Daily    furosemide  20 mg Oral Daily    nicotine  1 patch Transdermal Daily    OLANZapine  20 mg Oral QHS    OXcarbazepine  300 mg Oral BID    pantoprazole  40 mg Oral Daily    predniSONE  40 mg Oral Daily       Medications (infusions):         Medications (prn):       Current Facility-Administered Medications:     acetaminophen, 650 mg, Oral, Q8H PRN    acetaminophen, 650 mg, Oral, Q4H PRN    aluminum-magnesium hydroxide-simethicone, 30 mL, Oral, QID PRN    clonazePAM, 1 mg, Oral, BID PRN    dextrose 50%, 12.5 g, Intravenous, PRN    dextrose 50%, 25 g, Intravenous, PRN    glucagon (human recombinant), 1 mg, Intramuscular, PRN    glucose, 16 g, Oral, PRN    glucose, 24 g, Oral, PRN    magnesium oxide, 800 mg, Oral, PRN    magnesium oxide, 800 mg, Oral, PRN    melatonin, 6 mg, Oral, Nightly PRN    naloxone, 0.4 mg, Intravenous, PRN    ondansetron, 4 mg, Intravenous, Q6H PRN    potassium bicarbonate, 35 mEq, Oral, PRN    potassium bicarbonate, 50 mEq, Oral, PRN    potassium bicarbonate, 60 mEq, Oral, PRN    potassium, sodium phosphates, 2 packet, Oral, PRN    potassium, sodium phosphates, 2 packet, Oral, PRN    potassium, sodium phosphates, 2 packet, Oral, PRN    senna-docusate, 1 tablet, Oral, Daily PRN    sodium chloride 0.9%, 3 mL, Intravenous, PRN    Family History: No family history on file.    Social History: Tobacco: Tobacco Use History[1]                             EtOH:   Social History     Substance and Sexual Activity   Alcohol Use Not Currently                                Drugs:   Social History     Substance and Sexual Activity   Drug Use Not Currently       Physical Exam    Vital signs:  Temp:  [97.5 °F (36.4 °C)-98.1 °F (36.7 °C)]   Pulse:  [51-76]   Resp:  [14-24]   BP: ()/(58-78)   SpO2:  [92 %-96 %]     Intake/Output:   Intake/Output Summary  "(Last 24 hours) at 7/27/2025 0929  Last data filed at 7/27/2025 0557  Gross per 24 hour   Intake 863.13 ml   Output 300 ml   Net 563.13 ml        BMI: Estimated body mass index is 27.19 kg/m² as calculated from the following:    Height as of this encounter: 5' 8" (1.727 m).    Weight as of this encounter: 81.1 kg (178 lb 12.7 oz).    Physical Exam  Constitutional:       General: He is not in acute distress.     Appearance: Normal appearance. He is not ill-appearing.   HENT:      Head: Normocephalic and atraumatic.      Right Ear: External ear normal.      Left Ear: External ear normal.      Nose: Nose normal.      Mouth/Throat:      Mouth: Mucous membranes are moist.      Pharynx: Oropharynx is clear.   Eyes:      Extraocular Movements: Extraocular movements intact.      Pupils: Pupils are equal, round, and reactive to light.   Cardiovascular:      Rate and Rhythm: Normal rate and regular rhythm.      Pulses: Normal pulses.      Heart sounds: Normal heart sounds.   Pulmonary:      Breath sounds: Wheezing present.      Comments: Bilat wheezes, RLL absent breath sounds  Abdominal:      General: Abdomen is flat. Bowel sounds are normal. There is no distension.      Palpations: Abdomen is soft.      Tenderness: There is no abdominal tenderness.   Musculoskeletal:         General: Normal range of motion.      Cervical back: No rigidity or tenderness.      Right lower leg: No edema.      Left lower leg: No edema.   Lymphadenopathy:      Cervical: No cervical adenopathy.   Skin:     General: Skin is warm and dry.      Coloration: Skin is not jaundiced.      Findings: No bruising or erythema.      Comments: Chronic venous stasis changes BLE  Clubbing bilat fingernails   Neurological:      General: No focal deficit present.      Mental Status: He is alert and oriented to person, place, and time.      Cranial Nerves: No cranial nerve deficit.      Motor: No weakness.   Psychiatric:         Mood and Affect: Mood normal.        "  Behavior: Behavior normal.         Thought Content: Thought content normal.         Judgment: Judgment normal.         Laboratory    Recent Labs   Lab 07/27/25  0612   WBC 6.18   RBC 4.01*   HGB 12.4*   HCT 36.7*   *   MCV 92   MCH 30.9   MCHC 33.8       Recent Labs   Lab 07/27/25  0612   CALCIUM 8.1*   ALBUMIN 2.9*   PROT 5.7*      K 3.3*   CO2 27      BUN 5*   CREATININE 0.5   ALKPHOS 54*   ALT 4*   AST 15   BILITOT 1.7*       Recent Labs   Lab 07/26/25  1437   INR 1.3*   APTT 29.3       Recent Labs   Lab 07/26/25  1437   CPK 38       Additional labs:     Microbiology:       Microbiology Results (last 7 days)       Procedure Component Value Units Date/Time    Blood culture x two cultures. Draw prior to antibiotics. [[id number]]  (Normal) Collected: 07/26/25 1453    Order Status: Completed Specimen: Blood from Peripheral, Forearm, Right Updated: 07/26/25 2201     CULTURE, BLOOD ([facility]) No Growth After 6 Hours    Blood culture x two cultures. Draw prior to antibiotics. [[id number]]  (Normal) Collected: 07/26/25 1502    Order Status: Completed Specimen: Blood from Peripheral, Hand, Left Updated: 07/26/25 2201     CULTURE, BLOOD ([facility]) No Growth After 6 Hours    Influenza A & B by Molecular [[id number]]  (Normal) Collected: 07/26/25 1444    Order Status: Completed Specimen: Nasal Swab Updated: 07/26/25 1534     INFLUENZA A MOLECULAR Negative     INFLUENZA B MOLECULAR  Negative    Group A Strep, Molecular [[id number]]  (Normal) Collected: 07/26/25 1444    Order Status: Completed Specimen: Throat Updated: 07/26/25 1516     Group A Strep Molecular Negative            Radiology    US Abdomen Complete  Narrative: EXAMINATION:  US ABDOMEN COMPLETE    CLINICAL HISTORY:  hyperbili;    TECHNIQUE:  Complete abdominal ultrasound (including pancreas, aorta, liver, gallbladder, common bile duct, IVC, kidneys, and spleen) was performed.    COMPARISON:  None    FINDINGS:  Pancreas: The visualized portions of  pancreas appear normal.    Aorta: No aneurysm.    Liver: 16.3 cm, normal in size. Nodular contour of the liver is present consistent with cirrhosis.  No focal lesions.    Gallbladder: The gallbladder is not well visualized.    Biliary system: 6 mm common bile duct.  No intrahepatic ductal dilatation.    Inferior vena cava: Normal in appearance.    Right kidney: 10.6 x 4.9 x 4.4 cm. No hydronephrosis.    Left kidney: 11.1 x 3.9 x 3.9 cm. No hydronephrosis.    Spleen: 17.8 cm.  The spleen is enlarged.    Miscellaneous: Bilateral pleural effusions are present.  Impression: Cirrhosis, splenomegaly, and bilateral pleural effusions.    Electronically signed by: Mac Dixon  Date:    07/27/2025  Time:    00:25        Additional Studies        Ventilator Information    Oxygen Concentration (%):  [32] 32             Recent Labs     07/26/25  1751   PH 7.429   PCO2 34.9*   PO2 69*   HCO3 23.1*   POCSATURATED 94   BE -1         Impression    Active Hospital Problems    Diagnosis  POA    *Acute respiratory failure [J96.00]  Yes    Cirrhosis [K74.60]  Yes    Community acquired pneumonia [J18.9]  Yes    Seizures [R56.9]  Yes    Polysubstance abuse [F19.10]  Yes    Bipolar affective disorder [F31.9]  Yes      Resolved Hospital Problems   No resolved problems to display.       Plan    - suspect R pleural effusion related to portal HTN, cirrhosis  -  Lasix 40mg IV x1 today  - repeat CXR in am  - echo to eval for shunt/pulmonary HTN given clubbing  - start anticoagulation given portal vein, splenic vein thrombosis  - no thoracentesis recommended at this time, consider if not improving with diuresis  - continue nebs, prednisone for suspected COPD exacerbation  - needs to quit smoking    Thank you for this consult.  I will follow with you while the patient is hospitalized.      Christy Meléndez MD  Pulmonary & Critical Care Medicine                 [1]   Social History  Tobacco Use   Smoking Status Every Day   Smokeless Tobacco  Not on file

## 2025-07-27 NOTE — H&P
Critical access hospital Medicine  History & Physical    Patient Name: Christiano Gomez Jr.  MRN: [id number]  Patient Class: OP- Observation  Admission Date: 7/26/2025  Attending Physician: Valerie Heath MD   Primary Care Provider: Erik Drew         Patient information was obtained from patient and ER records.     Subjective:     Principal Problem:Acute respiratory failure    Chief Complaint:   Chief Complaint   Patient presents with    Shortness of Breath     Sent from Urgent Care. Was told he had double pneumonia today.        HPI: This is a pleasant 50-year-old gentleman with comorbid conditions of cirrhosis, mood disorder who presents to the emergency department with complaints of fatigue, wheezing and dyspnea.  Patient found to be markedly wheezy on admission as well as hypoxic requiring 2 L nasal cannula.  Imaging showing bilateral pleural effusions and infiltrate.  He is initiated on therapy for community-acquired pneumonia and COPD exacerbation with antibiotics, steroids and nebulizers.  Admission to Hospital Medicine.    Past Medical History:   Diagnosis Date    Bipolar disorder     Cirrhosis of liver     Hepatitis C virus infection cured after antiviral drug therapy     treated / cured (SVR 6/2024)    OD (overdose of drug)     Seizures        Past Surgical History:   Procedure Laterality Date    ESOPHAGOGASTRODUODENOSCOPY N/A 6/3/2025    Procedure: EGD (ESOPHAGOGASTRODUODENOSCOPY);  Surgeon: Tony Tinajero MD;  Location: [facility];  Service: Endoscopy;  Laterality: N/A;  ppm       Review of patient's allergies indicates:  No Known Allergies    No current facility-administered medications on file prior to encounter.     Current Outpatient Medications on File Prior to Encounter   Medication Sig    albuterol-ipratropium (DUO-NEB) 2.5 mg-0.5 mg/3 mL nebulizer solution Take 3 mLs by nebulization every 6 (six) hours as needed for Wheezing. Rescue    buprenorphine-naloxone 8-2  (SUBOXONE) 8-2 mg Subl Place 1 tablet under the tongue 2 (two) times daily.    clonazePAM (KLONOPIN) 1 MG tablet Take 1 mg by mouth 2 (two) times daily as needed for Anxiety.    cloNIDine (CATAPRES) 0.1 MG tablet Take 0.1 mg by mouth every evening.    FLUoxetine 20 MG capsule Take 20 mg by mouth once daily.    furosemide (LASIX) 20 MG tablet Take 2 tablets (40 mg total) by mouth once daily. (Patient taking differently: Take 20 mg by mouth once daily.)    OLANZapine (ZYPREXA) 20 MG tablet Take 1 tablet by mouth every evening.    OXcarbazepine (TRILEPTAL) 300 MG Tab Take 300 mg by mouth 2 (two) times daily.    pantoprazole (PROTONIX) 40 MG tablet Take 1 tablet (40 mg total) by mouth once daily.    NARCAN 4 mg/actuation Spry 1 spray in 1 nostril as needed for opioid overdose; may repeat 1 spray in alternating nostrils every 2-3 minutes as needed until patient is responsive or EMS arrives    [DISCONTINUED] albuterol (PROVENTIL/VENTOLIN HFA) 90 mcg/actuation inhaler Inhale 1 puff into the lungs every 4 to 6 hours as needed.    [DISCONTINUED] hepatitis A and B vaccine, PF, (TWINRIX) 720 ADRIAN unit- 20 mcg/mL Syrg suspension Inject 1mL IM at 0, 1, and 6 months    [DISCONTINUED] ondansetron (ZOFRAN-ODT) 4 MG TbDL Take 1 tablet (4 mg total) by mouth every 6 (six) hours as needed (Nausea).    [DISCONTINUED] spironolactone (ALDACTONE) 50 MG tablet Take 2 tablets (100 mg total) by mouth once daily.    [DISCONTINUED] VITAMIN D2 1,250 mcg (50,000 unit) capsule Take 50,000 Units by mouth every 7 days.     Family History    None       Tobacco Use    Smoking status: Every Day    Smokeless tobacco: Not on file   Vaping Use    Vaping status: Never Used   Substance and Sexual Activity    Alcohol use: Not Currently    Drug use: Not Currently    Sexual activity: Yes     Partners: Female     Review of Systems   Constitutional:  Positive for fatigue.   Respiratory:  Positive for cough, shortness of breath and wheezing.    Cardiovascular:   Negative for chest pain.   Gastrointestinal:  Negative for abdominal pain.   Neurological:  Positive for weakness.   Psychiatric/Behavioral:  Negative for agitation and confusion.      Objective:     Vital Signs (Most Recent):  Temp: 98 °F (36.7 °C) (07/26/25 1954)  Pulse: 65 (07/26/25 1954)  Resp: 16 (07/26/25 1954)  BP: 101/65 (07/26/25 1954)  SpO2: 96 % (07/26/25 1954) Vital Signs (24h Range):  Temp:  [97.9 °F (36.6 °C)-98.1 °F (36.7 °C)] 98 °F (36.7 °C)  Pulse:  [65-76] 65  Resp:  [14-24] 16  SpO2:  [92 %-96 %] 96 %  BP: (101-122)/(65-78) 101/65     Weight: 81.1 kg (178 lb 12.7 oz)  Body mass index is 27.19 kg/m².     Physical Exam  Eyes:      Extraocular Movements: Extraocular movements intact.   Cardiovascular:      Rate and Rhythm: Normal rate.   Pulmonary:      Effort: Pulmonary effort is normal. No respiratory distress.      Breath sounds: Wheezing and rhonchi present.   Abdominal:      Palpations: Abdomen is soft.      Tenderness: There is no abdominal tenderness.   Neurological:      General: No focal deficit present.      Mental Status: He is alert and oriented to person, place, and time.   Psychiatric:         Mood and Affect: Mood normal.         Behavior: Behavior normal.                Significant Labs: All pertinent labs within the past 24 hours have been reviewed.  BMP:   Recent Labs   Lab 07/26/25  1437   GLU 95      K 3.4*      CO2 27   BUN 6   CREATININE 0.5   CALCIUM 8.3*   MG 2.0     CBC:   Recent Labs   Lab 07/26/25  1437   WBC 8.54   HGB 13.6*   HCT 38.9*   *       Significant Imaging: I have reviewed all pertinent imaging results/findings within the past 24 hours.  Assessment/Plan:     Assessment & Plan  Acute respiratory failure  Patient with Hypoxic Respiratory failure which is Acute.  he is not on home oxygen. Supplemental oxygen was provided and noted-      .   Signs/symptoms of respiratory failure include- tachypnea and wheezing. Contributing diagnoses includes -  COPD and Pneumonia Labs and images were reviewed. Patient Has recent ABG, which has been reviewed. Will treat underlying causes and adjust management of respiratory failure as follows- antibiotics, steroids, nebulizers  Bipolar affective disorder  Home Olanzapine ordered    Seizures    Home Oxcarbazepine ordered  Polysubstance abuse  Home Suboxone ordered    Cirrhosis  Patient with known Cirrhosis with Child's class B. Co-morbidities are present and inclusive of malnutrition and anemia/pancytopenia.  MELD-Na score calculated; MELD 3.0: 14 at 7/26/2025  2:37 PM  MELD-Na: 14 at 7/26/2025  2:37 PM  Calculated from:  Serum Creatinine: 0.5 mg/dL (Using min of 1 mg/dL) at 7/26/2025  2:37 PM  Serum Sodium: 136 mmol/L at 7/26/2025  2:37 PM  Total Bilirubin: 2.5 mg/dL at 7/26/2025  2:37 PM  Serum Albumin: 3.2 g/dL at 7/26/2025  2:37 PM  INR(ratio): 1.3 at 7/26/2025  2:37 PM  Age at listing (hypothetical): 50 years  Sex: Male at 7/26/2025  2:37 PM      Continue chronic meds. Etiology likely Hepatitis. Will avoid any hepatotoxic meds, and monitor CBC/CMP/INR for synthetic function. Resume home lasix    Follows with hepatology and has had recent EGD  Community acquired pneumonia  Patient has a diagnosis of pneumonia. The cause of the pneumonia is suspected to be bacterial in etiology but organism is not known. The pneumonia is stable. The patient has the following signs/symptoms of pneumonia: persistent hypoxia , cough, and shortness of breath. The patient does have a current oxygen requirement and the patient does not have a home oxygen requirement.    Current antimicrobial regimen consists of the antibiotics listed below. Will monitor patient closely and continue current treatment plan unchanged.    Antibiotics (From admission, onward)      Start     Stop Route Frequency Ordered    07/26/25 2000  azithromycin (ZITHROMAX) 500 mg in 0.9% NaCl 250 mL IVPB (admixture device)         07/29/25 1959 IV Every 24 hours (non-standard  times) 07/26/25 1847    07/26/25 0000  cefTRIAXone injection 1 g         -- IV Every 24 hours (non-standard times) 07/26/25 1842            Microbiology Results (last 7 days)       Procedure Component Value Units Date/Time    Influenza A & B by Molecular [[id number]]  (Normal) Collected: 07/26/25 1444    Order Status: Completed Specimen: Nasal Swab Updated: 07/26/25 1534     INFLUENZA A MOLECULAR Negative     INFLUENZA B MOLECULAR  Negative    Group A Strep, Molecular [[id number]]  (Normal) Collected: 07/26/25 1444    Order Status: Completed Specimen: Throat Updated: 07/26/25 1516     Group A Strep Molecular Negative    Blood culture x two cultures. Draw prior to antibiotics. [[id number]] Collected: 07/26/25 1502    Order Status: Sent Specimen: Blood from Peripheral, Hand, Left Updated: 07/26/25 1507    Blood culture x two cultures. Draw prior to antibiotics. [[id number]] Collected: 07/26/25 1453    Order Status: Sent Specimen: Blood from Peripheral, Forearm, Right Updated: 07/26/25 1507          VTE Risk Mitigation (From admission, onward)           Ordered     enoxaparin injection 40 mg  Daily         07/26/25 1721     IP VTE HIGH RISK PATIENT  Once         07/26/25 1721     Place sequential compression device  Until discontinued         07/26/25 1721                             On 07/26/2025, patient should be placed in hospital observation services under my care.          Valerie Heath MD  Department of Hospital Medicine  [facility]

## 2025-07-27 NOTE — ASSESSMENT & PLAN NOTE
Large on right.  Small on left.  -pulmonary consult, discussed with them may try diuresis 1st.  This maybe limited with his low blood pressure  -trial Lasix 40 mg IV x1  -repeat CXR in a.m.

## 2025-07-27 NOTE — ASSESSMENT & PLAN NOTE
Patient's most recent potassium results are listed below.   Recent Labs     07/26/25  1437 07/27/25  0612   K 3.4* 3.3*     Plan  - Replete potassium per protocol  - Monitor potassium Daily  - Patient's hypokalemia is stable

## 2025-07-27 NOTE — ASSESSMENT & PLAN NOTE
Patient's COPD is with exacerbation noted by worsening of baseline hypoxia and wheezing currently.  Patient is currently off COPD Pathway. Continue scheduled inhalers, Steroids and Supplemental oxygen and monitor respiratory status closely.

## 2025-07-27 NOTE — ASSESSMENT & PLAN NOTE
Patient with Hypoxic Respiratory failure which is Acute.  he is not on home oxygen. Supplemental oxygen was provided and noted- Oxygen Concentration (%):  [32] 32    Signs/symptoms of respiratory failure include- wheezing and hypoxia. Contributing diagnoses includes - COPD and Pleural effusion Labs and images were reviewed. Patient Has recent ABG, which has been reviewed. Will treat underlying causes and adjust management of respiratory failure as follows-  steroids, nebulizers, pulmonary consult

## 2025-07-27 NOTE — SUBJECTIVE & OBJECTIVE
Past Medical History:   Diagnosis Date    Bipolar disorder     Cirrhosis of liver     Hepatitis C virus infection cured after antiviral drug therapy     treated / cured (SVR 6/2024)    OD (overdose of drug)     Seizures        Past Surgical History:   Procedure Laterality Date    ESOPHAGOGASTRODUODENOSCOPY N/A 6/3/2025    Procedure: EGD (ESOPHAGOGASTRODUODENOSCOPY);  Surgeon: Tony Tinajero MD;  Location: [facility];  Service: Endoscopy;  Laterality: N/A;  ppm       Review of patient's allergies indicates:  No Known Allergies    No current facility-administered medications on file prior to encounter.     Current Outpatient Medications on File Prior to Encounter   Medication Sig    albuterol-ipratropium (DUO-NEB) 2.5 mg-0.5 mg/3 mL nebulizer solution Take 3 mLs by nebulization every 6 (six) hours as needed for Wheezing. Rescue    buprenorphine-naloxone 8-2 (SUBOXONE) 8-2 mg Subl Place 1 tablet under the tongue 2 (two) times daily.    clonazePAM (KLONOPIN) 1 MG tablet Take 1 mg by mouth 2 (two) times daily as needed for Anxiety.    cloNIDine (CATAPRES) 0.1 MG tablet Take 0.1 mg by mouth every evening.    FLUoxetine 20 MG capsule Take 20 mg by mouth once daily.    furosemide (LASIX) 20 MG tablet Take 2 tablets (40 mg total) by mouth once daily. (Patient taking differently: Take 20 mg by mouth once daily.)    OLANZapine (ZYPREXA) 20 MG tablet Take 1 tablet by mouth every evening.    OXcarbazepine (TRILEPTAL) 300 MG Tab Take 300 mg by mouth 2 (two) times daily.    pantoprazole (PROTONIX) 40 MG tablet Take 1 tablet (40 mg total) by mouth once daily.    NARCAN 4 mg/actuation Spry 1 spray in 1 nostril as needed for opioid overdose; may repeat 1 spray in alternating nostrils every 2-3 minutes as needed until patient is responsive or EMS arrives    [DISCONTINUED] albuterol (PROVENTIL/VENTOLIN HFA) 90 mcg/actuation inhaler Inhale 1 puff into the lungs every 4 to 6 hours as needed.    [DISCONTINUED] hepatitis A and B  vaccine, PF, (TWINRIX) 720 ADRIAN unit- 20 mcg/mL Syrg suspension Inject 1mL IM at 0, 1, and 6 months    [DISCONTINUED] ondansetron (ZOFRAN-ODT) 4 MG TbDL Take 1 tablet (4 mg total) by mouth every 6 (six) hours as needed (Nausea).    [DISCONTINUED] spironolactone (ALDACTONE) 50 MG tablet Take 2 tablets (100 mg total) by mouth once daily.    [DISCONTINUED] VITAMIN D2 1,250 mcg (50,000 unit) capsule Take 50,000 Units by mouth every 7 days.     Family History    None       Tobacco Use    Smoking status: Every Day    Smokeless tobacco: Not on file   Vaping Use    Vaping status: Never Used   Substance and Sexual Activity    Alcohol use: Not Currently    Drug use: Not Currently    Sexual activity: Yes     Partners: Female     Review of Systems   Constitutional:  Positive for fatigue.   Respiratory:  Positive for cough, shortness of breath and wheezing.    Gastrointestinal:  Negative for abdominal pain.   Psychiatric/Behavioral:  Negative for agitation and confusion.      Objective:     Vital Signs (Most Recent):  Temp: 98 °F (36.7 °C) (07/26/25 1954)  Pulse: 65 (07/26/25 1954)  Resp: 16 (07/26/25 1954)  BP: 101/65 (07/26/25 1954)  SpO2: 96 % (07/26/25 1954) Vital Signs (24h Range):  Temp:  [97.9 °F (36.6 °C)-98.1 °F (36.7 °C)] 98 °F (36.7 °C)  Pulse:  [65-76] 65  Resp:  [14-24] 16  SpO2:  [92 %-96 %] 96 %  BP: (101-122)/(65-78) 101/65     Weight: 81.1 kg (178 lb 12.7 oz)  Body mass index is 27.19 kg/m².     Physical Exam  HENT:      Head: Atraumatic.   Eyes:      Extraocular Movements: Extraocular movements intact.   Cardiovascular:      Rate and Rhythm: Normal rate.   Pulmonary:      Effort: Pulmonary effort is normal.      Breath sounds: Wheezing and rhonchi present.   Abdominal:      General: There is no distension.      Palpations: Abdomen is soft.      Tenderness: There is no abdominal tenderness.   Skin:     General: Skin is warm and dry.   Neurological:      General: No focal deficit present.      Mental Status: He  is alert and oriented to person, place, and time.                Significant Labs: All pertinent labs within the past 24 hours have been reviewed.  BMP:   Recent Labs   Lab 07/26/25  1437   GLU 95      K 3.4*      CO2 27   BUN 6   CREATININE 0.5   CALCIUM 8.3*   MG 2.0     CBC:   Recent Labs   Lab 07/26/25  1437   WBC 8.54   HGB 13.6*   HCT 38.9*   *       Significant Imaging: I have reviewed all pertinent imaging results/findings within the past 24 hours.

## 2025-07-27 NOTE — ASSESSMENT & PLAN NOTE
Patient with known Cirrhosis with Child's class B. Co-morbidities are present and inclusive of portal hypertension, esophageal varices, portal venous thrombosis, malnutrition, and anemia/thrombocytopenia.  MELD-Na score calculated; MELD 3.0: 13 at 7/27/2025  6:12 AM  MELD-Na: 11 at 7/27/2025  6:12 AM  Calculated from:  Serum Creatinine: 0.5 mg/dL (Using min of 1 mg/dL) at 7/27/2025  6:12 AM  Serum Sodium: 136 mmol/L at 7/27/2025  6:12 AM  Total Bilirubin: 1.7 mg/dL at 7/27/2025  6:12 AM  Serum Albumin: 2.9 g/dL at 7/27/2025  6:12 AM  INR(ratio): 1.3 at 7/26/2025  2:37 PM  Age at listing (hypothetical): 50 years  Sex: Male at 7/27/2025  6:12 AM    Continue chronic meds. Etiology likely Hepatitis C which was cured. Will avoid any hepatotoxic meds, and monitor CBC/CMP/INR for synthetic function.  Continue home lasix. Follows with hepatology and has had recent EGD

## 2025-07-27 NOTE — HOSPITAL COURSE
50-year-old male with PMH per HPI is admitted due to acute hypoxic respiratory failure attributed to COPD exacerbation and a large right pleural effusion.  Also has a small left pleural effusion.  He required nasal cannula oxygen support.  Also found to have thrombosis in the portal vein, splenic vein, and superior mesenteric vein.  Initially given antibiotics for concern for pneumonia but this was ruled out and antibiotics stopped with CT imaging showing more likely atelectasis or scarring.  Treated with nebulized breathing treatments and corticosteroids.  Started on therapeutic Lovenox.  Consults to pulmonology and vascular surgery.  Vascular surgery recommended anticoagulation for his thromboses without other intervention.  Subsequent chest x-ray showed worsening a right large pleural effusion.  Interventional Radiology consulted for right-sided thoracentesis.  Anticoagulation therapy held.  Patient underwent ultrasound-guided right-sided thoracentesis on July 29, 2025 where 1700 cc blood-tinged pleural fluid removed and sent for analysis.  Patient tolerated procedure well.  Patient to be evaluated for home oxygen.  Patient's diet to be advanced.

## 2025-07-28 ENCOUNTER — CLINICAL SUPPORT (OUTPATIENT)
Dept: CARDIOLOGY | Facility: HOSPITAL | Age: 50
End: 2025-07-28
Attending: INTERNAL MEDICINE
Payer: MEDICAID

## 2025-07-28 VITALS — WEIGHT: 178.81 LBS | HEIGHT: 68 IN | BODY MASS INDEX: 27.1 KG/M2

## 2025-07-28 LAB
ALBUMIN SERPL-MCNC: 3.2 G/DL (ref 3.5–5.2)
ALP SERPL-CCNC: 64 UNIT/L (ref 55–135)
ALT SERPL-CCNC: 7 UNIT/L (ref 10–44)
ANION GAP (SMH): 8 MMOL/L (ref 8–16)
AORTIC ROOT ANNULUS: 2.9 CM
AORTIC VALVE CUSP SEPERATION: 1.9 CM
APICAL FOUR CHAMBER EJECTION FRACTION: 68 %
APICAL TWO CHAMBER EJECTION FRACTION: 60 %
AST SERPL-CCNC: 26 UNIT/L (ref 10–40)
AV INDEX (PROSTH): 0.93
AV MEAN GRADIENT: 6 MMHG
AV PEAK GRADIENT: 13 MMHG
AV VALVE AREA BY VELOCITY RATIO: 3.3 CM²
AV VALVE AREA: 3.2 CM²
AV VELOCITY RATIO: 0.94
BILIRUB SERPL-MCNC: 1.8 MG/DL (ref 0.1–1)
BSA FOR ECHO PROCEDURE: 1.97 M2
BUN SERPL-MCNC: 4 MG/DL (ref 6–20)
CALCIUM SERPL-MCNC: 8.3 MG/DL (ref 8.7–10.5)
CHLORIDE SERPL-SCNC: 100 MMOL/L (ref 95–110)
CO2 SERPL-SCNC: 28 MMOL/L (ref 23–29)
CREAT SERPL-MCNC: 0.5 MG/DL (ref 0.5–1.4)
CV ECHO LV RWT: 0.47 CM
DOP CALC AO PEAK VEL: 1.8 M/S
DOP CALC AO VTI: 33.7 CM
DOP CALC LVOT AREA: 3.5 CM2
DOP CALC LVOT DIAMETER: 2.1 CM
DOP CALC LVOT PEAK VEL: 1.7 M/S
DOP CALC LVOT STROKE VOLUME: 108.4 CM3
DOP CALC MV VTI: 34.7 CM
DOP CALCLVOT PEAK VEL VTI: 31.3 CM
E WAVE DECELERATION TIME: 227 MSEC
E/A RATIO: 1.2
E/E' RATIO: 6 M/S
ECHO LV POSTERIOR WALL: 1.1 CM (ref 0.6–1.1)
ERYTHROCYTE [DISTWIDTH] IN BLOOD BY AUTOMATED COUNT: 13.6 % (ref 11.5–14.5)
FRACTIONAL SHORTENING: 31.9 % (ref 28–44)
GFR SERPLBLD CREATININE-BSD FMLA CKD-EPI: >60 ML/MIN/1.73/M2
GLUCOSE SERPL-MCNC: 106 MG/DL (ref 70–110)
HCT VFR BLD AUTO: 38.5 % (ref 40–54)
HGB BLD-MCNC: 13.2 GM/DL (ref 14–18)
INTERVENTRICULAR SEPTUM: 1 CM (ref 0.6–1.1)
LEFT ATRIUM AREA SYSTOLIC (APICAL 2 CHAMBER): 18 CM2
LEFT ATRIUM AREA SYSTOLIC (APICAL 4 CHAMBER): 13.8 CM2
LEFT ATRIUM VOLUME INDEX MOD: 20 ML/M2
LEFT ATRIUM VOLUME MOD: 39 ML
LEFT INTERNAL DIMENSION IN SYSTOLE: 3.2 CM (ref 2.1–4)
LEFT VENTRICLE DIASTOLIC VOLUME INDEX: 53.33 ML/M2
LEFT VENTRICLE DIASTOLIC VOLUME: 104 ML
LEFT VENTRICLE END DIASTOLIC VOLUME APICAL 2 CHAMBER: 107 ML
LEFT VENTRICLE END DIASTOLIC VOLUME APICAL 4 CHAMBER: 95.8 ML
LEFT VENTRICLE END SYSTOLIC VOLUME APICAL 2 CHAMBER: 52.2 ML
LEFT VENTRICLE END SYSTOLIC VOLUME APICAL 4 CHAMBER: 28.5 ML
LEFT VENTRICLE MASS INDEX: 90.2 G/M2
LEFT VENTRICLE SYSTOLIC VOLUME INDEX: 20.5 ML/M2
LEFT VENTRICLE SYSTOLIC VOLUME: 40 ML
LEFT VENTRICULAR INTERNAL DIMENSION IN DIASTOLE: 4.7 CM (ref 3.5–6)
LEFT VENTRICULAR MASS: 175.8 G
LV LATERAL E/E' RATIO: 5.3 M/S
LV SEPTAL E/E' RATIO: 7.2 M/S
LVED V (TEICH): 104 ML
LVES V (TEICH): 40 ML
LVOT MG: 6 MMHG
LVOT MV: 1.06 CM/S
MAGNESIUM SERPL-MCNC: 1.8 MG/DL (ref 1.6–2.6)
MCH RBC QN AUTO: 30.8 PG (ref 27–31)
MCHC RBC AUTO-ENTMCNC: 34.3 G/DL (ref 32–36)
MCV RBC AUTO: 90 FL (ref 82–98)
MV MEAN GRADIENT: 1 MMHG
MV PEAK A VEL: 0.66 M/S
MV PEAK E VEL: 0.79 M/S
MV PEAK GRADIENT: 3 MMHG
MV STENOSIS PRESSURE HALF TIME: 131 MS
MV VALVE AREA BY CONTINUITY EQUATION: 3.12 CM2
MV VALVE AREA P 1/2 METHOD: 1.68 CM2
OHS CV CPX PATIENT HEIGHT IN: 68
OHS LV EJECTION FRACTION SIMPSONS BIPLANE MOD: 64 %
PHOSPHATE SERPL-MCNC: 2.9 MG/DL (ref 2.7–4.5)
PISA TR MAX VEL: 2.2 M/S
PLATELET # BLD AUTO: 109 K/UL (ref 150–450)
PLATELET BLD QL SMEAR: ABNORMAL
PMV BLD AUTO: 11.1 FL (ref 9.2–12.9)
POTASSIUM SERPL-SCNC: 3 MMOL/L (ref 3.5–5.1)
PROT SERPL-MCNC: 6.4 GM/DL (ref 6–8.4)
PV MV: 0.74 M/S
PV PEAK GRADIENT: 5 MMHG
PV PEAK VELOCITY: 1.11 M/S
RA PRESSURE ESTIMATED: 3 MMHG
RBC # BLD AUTO: 4.28 M/UL (ref 4.6–6.2)
RIGHT ATRIUM END SYSTOLIC VOLUME APICAL 4 CHAMBER INDEX BSA: 21.95 ML/M2
RIGHT ATRIUM VOLUME AREA LENGTH APICAL 4 CHAMBER: 42.8 ML
RV TB RVSP: 5 MMHG
RV TISSUE DOPPLER FREE WALL SYSTOLIC VELOCITY 1 (APICAL 4 CHAMBER VIEW): 16.4 CM/S
SODIUM SERPL-SCNC: 136 MMOL/L (ref 136–145)
TDI LATERAL: 0.15 M/S
TDI SEPTAL: 0.11 M/S
TDI: 0.13 M/S
TR MAX PG: 20 MMHG
TRICUSPID ANNULAR PLANE SYSTOLIC EXCURSION: 2.8 CM
TV REST PULMONARY ARTERY PRESSURE: 22 MMHG
WBC # BLD AUTO: 9.54 K/UL (ref 3.9–12.7)
Z-SCORE OF LEFT VENTRICULAR DIMENSION IN END DIASTOLE: -1.68
Z-SCORE OF LEFT VENTRICULAR DIMENSION IN END SYSTOLE: -0.52

## 2025-07-28 PROCEDURE — 36415 COLL VENOUS BLD VENIPUNCTURE: CPT | Performed by: STUDENT IN AN ORGANIZED HEALTH CARE EDUCATION/TRAINING PROGRAM

## 2025-07-28 PROCEDURE — 94640 AIRWAY INHALATION TREATMENT: CPT

## 2025-07-28 PROCEDURE — 25000242 PHARM REV CODE 250 ALT 637 W/ HCPCS

## 2025-07-28 PROCEDURE — 25000003 PHARM REV CODE 250: Performed by: INTERNAL MEDICINE

## 2025-07-28 PROCEDURE — 84100 ASSAY OF PHOSPHORUS: CPT | Performed by: STUDENT IN AN ORGANIZED HEALTH CARE EDUCATION/TRAINING PROGRAM

## 2025-07-28 PROCEDURE — 27000221 HC OXYGEN, UP TO 24 HOURS

## 2025-07-28 PROCEDURE — 85027 COMPLETE CBC AUTOMATED: CPT | Performed by: STUDENT IN AN ORGANIZED HEALTH CARE EDUCATION/TRAINING PROGRAM

## 2025-07-28 PROCEDURE — 83735 ASSAY OF MAGNESIUM: CPT

## 2025-07-28 PROCEDURE — 25000003 PHARM REV CODE 250

## 2025-07-28 PROCEDURE — 99900031 HC PATIENT EDUCATION (STAT)

## 2025-07-28 PROCEDURE — 99900035 HC TECH TIME PER 15 MIN (STAT)

## 2025-07-28 PROCEDURE — 93306 TTE W/DOPPLER COMPLETE: CPT | Mod: 26,,, | Performed by: GENERAL PRACTICE

## 2025-07-28 PROCEDURE — 94761 N-INVAS EAR/PLS OXIMETRY MLT: CPT

## 2025-07-28 PROCEDURE — 63600175 PHARM REV CODE 636 W HCPCS

## 2025-07-28 PROCEDURE — 93306 TTE W/DOPPLER COMPLETE: CPT

## 2025-07-28 PROCEDURE — 63600175 PHARM REV CODE 636 W HCPCS: Performed by: STUDENT IN AN ORGANIZED HEALTH CARE EDUCATION/TRAINING PROGRAM

## 2025-07-28 PROCEDURE — 99223 1ST HOSP IP/OBS HIGH 75: CPT | Mod: ,,, | Performed by: STUDENT IN AN ORGANIZED HEALTH CARE EDUCATION/TRAINING PROGRAM

## 2025-07-28 PROCEDURE — 99233 SBSQ HOSP IP/OBS HIGH 50: CPT | Mod: ,,, | Performed by: INTERNAL MEDICINE

## 2025-07-28 PROCEDURE — S4991 NICOTINE PATCH NONLEGEND: HCPCS

## 2025-07-28 PROCEDURE — 80053 COMPREHEN METABOLIC PANEL: CPT

## 2025-07-28 PROCEDURE — 96372 THER/PROPH/DIAG INJ SC/IM: CPT | Performed by: STUDENT IN AN ORGANIZED HEALTH CARE EDUCATION/TRAINING PROGRAM

## 2025-07-28 PROCEDURE — 11000001 HC ACUTE MED/SURG PRIVATE ROOM

## 2025-07-28 RX ORDER — POTASSIUM CHLORIDE 20 MEQ/1
40 TABLET, EXTENDED RELEASE ORAL ONCE
Status: COMPLETED | OUTPATIENT
Start: 2025-07-28 | End: 2025-07-28

## 2025-07-28 RX ADMIN — BUPRENORPHINE AND NALOXONE 1 FILM: 8; 2 FILM BUCCAL; SUBLINGUAL at 11:07

## 2025-07-28 RX ADMIN — FUROSEMIDE 40 MG: 10 INJECTION, SOLUTION INTRAMUSCULAR; INTRAVENOUS at 06:07

## 2025-07-28 RX ADMIN — BUPRENORPHINE AND NALOXONE 1 FILM: 8; 2 FILM BUCCAL; SUBLINGUAL at 08:07

## 2025-07-28 RX ADMIN — FUROSEMIDE 40 MG: 10 INJECTION, SOLUTION INTRAMUSCULAR; INTRAVENOUS at 11:07

## 2025-07-28 RX ADMIN — PANTOPRAZOLE SODIUM 40 MG: 40 TABLET, DELAYED RELEASE ORAL at 05:07

## 2025-07-28 RX ADMIN — PREDNISONE 40 MG: 20 TABLET ORAL at 11:07

## 2025-07-28 RX ADMIN — IPRATROPIUM BROMIDE AND ALBUTEROL SULFATE 3 ML: 2.5; .5 SOLUTION RESPIRATORY (INHALATION) at 07:07

## 2025-07-28 RX ADMIN — ENOXAPARIN SODIUM 80 MG: 80 INJECTION SUBCUTANEOUS at 05:07

## 2025-07-28 RX ADMIN — OXCARBAZEPINE 300 MG: 150 TABLET, FILM COATED ORAL at 08:07

## 2025-07-28 RX ADMIN — CLONIDINE HYDROCHLORIDE 0.1 MG: 0.1 TABLET ORAL at 08:07

## 2025-07-28 RX ADMIN — OXCARBAZEPINE 300 MG: 150 TABLET, FILM COATED ORAL at 11:07

## 2025-07-28 RX ADMIN — OLANZAPINE 20 MG: 5 TABLET, FILM COATED ORAL at 08:07

## 2025-07-28 RX ADMIN — NICOTINE 1 PATCH: 21 PATCH, EXTENDED RELEASE TRANSDERMAL at 11:07

## 2025-07-28 RX ADMIN — FLUOXETINE HYDROCHLORIDE 20 MG: 20 CAPSULE ORAL at 11:07

## 2025-07-28 RX ADMIN — IPRATROPIUM BROMIDE AND ALBUTEROL SULFATE 3 ML: 2.5; .5 SOLUTION RESPIRATORY (INHALATION) at 02:07

## 2025-07-28 RX ADMIN — POTASSIUM CHLORIDE 40 MEQ: 1500 TABLET, EXTENDED RELEASE ORAL at 01:07

## 2025-07-28 NOTE — ASSESSMENT & PLAN NOTE
Increasing Large on right.  Small on left.  -follow Pulmonary recommendations.  Patient to undergo right-sided ultrasound-guided thoracentesis.  Hold evening and morning dose of Lovenox.

## 2025-07-28 NOTE — ASSESSMENT & PLAN NOTE
Patient's most recent potassium results are listed below.   Recent Labs     07/26/25  1437 07/27/25  0612 07/28/25  0447   K 3.4* 3.3* 3.0*     Plan  - Replete potassium per protocol  - Monitor potassium Daily  - Patient's hypokalemia is stable    Discussed with Dr. Gilmore, bedside nurse and .  Follow 2D echocardiogram.

## 2025-07-28 NOTE — PROGRESS NOTES
"[facility]  Adult Nutrition   Progress Note (Initial Assessment)     Admit Date: 7/26/2025   Total duration of encounter: 2 days     SUMMARY   Recommendations  1. Continue sodium restricted diet as tolerated. 2.  to obtain daily menu choices.    Nutrition Goals:   Goal #1: PO intake will meet greater than or equal to 75% of estimated needs by RD follow up      Goal #2: Lab values to trend toward normal range by RD follow up  Nutrition Goal Status #2: new    Nutrition Interventions: Sodium modified diet      Nutrition Diagnosis   Other (comment) (Unplanned weight loss) related to Chronic illness as evidence by Intake <75% estimated needs, Weight loss greater than or equal to 7.5% in 3 months, Lab markers  Status: New    Dietitian Rounds Brief  RD screen for MST 2. Patient unsure of weight loss. Pt reported he weighed 200 lbs 2 weeks ago. Patient with 19%, 42 lbs in 1 month per Epic. No visible signs of malnutrition. BMI 27; pt reports good intake. RD to monitor intake, weight, labs, and status change PRN.      Malnutrition Assessment             Weight Loss (Malnutrition): greater than 7.5% in 3 months   Orbital Region: well nourished  Cheek Region: well nourished  Upper Arm Region : well nourished   Judaism Region (Muscle Loss): well nourished  Clavicle Bone Region (Muscle Loss): well nourished  Dorsal Hand (Muscle Loss): well nourished                 Diet order:   Diet Low Sodium (2 gm)     Oral Nutrition Supplement: Patient declined.             Evaluation of Received Nutrient/Fluid Intake  Energy Calories Required: meeting needs  Protein Required: meeting needs  Fluid Required: meeting needs  Tolerance: tolerating     % Intake of Estimated Energy Needs: 75 - 100 %  % Meal Intake: 75 - 100 %      Intake/Output Summary (Last 24 hours) at 7/28/2025 1613  Last data filed at 7/28/2025 0608  Gross per 24 hour   Intake 720 ml   Output 750 ml   Net -30 ml        Anthropometrics  Height: 5' 8" (172.7 " cm)  Height (inches): 68 in  Height Method: Stated  Weight: 81.1 kg (178 lb 12.7 oz)  Weight (lb): 178.79 lb  Weight Method: Bed Scale  Ideal Body Weight (IBW), Male: 154 lb  % Ideal Body Weight, Male (lb): 116.1 %  BMI (Calculated): 27.2  BMI Grade: 25 - 29.9 - overweight       Estimated/Assessed Needs  Weight Used For Calorie Calculations: 81.1 kg (178 lb 12.7 oz)  Energy Calorie Requirements (kcal): 2426-9039 kcal/day (25-30 kcal/kg)  Energy Need Method: Kcal/kg  Protein Requirements:  gm/day (1.2-16 gm/kg).  Weight Used For Protein Calculations: 81.1 kg (178 lb 12.7 oz)  Fluid Requirements (mL): RDA or per MD  Estimated Fluid Requirement Method: RDA Method, other (see comments)  RDA Method (mL): 2027  CHO Requirement: n/a    Reason for Assessment  Reason For Assessment: identified at risk by screening criteria (MST 2.)  Diagnosis: pulmonary disease  General Information Comments: Patient with history of cirrhosis admitted with respiartory failure, pleural effucions.    Final diagnoses:  [J22] Lower respiratory infection (Primary)  [R06.02] Shortness of breath  [R09.02, Z99.81] Hypoxemia requiring supplemental oxygen     Past Medical History:   Diagnosis Date    Bipolar disorder     Cirrhosis of liver     Hepatitis C virus infection cured after antiviral drug therapy     treated / cured (SVR 6/2024)    OD (overdose of drug)     Seizures         Nutrition/Diet History  Nutrition Intake History: No reported poor intake priot to admit.  Food Preferences: Patient reports eating some of  breakfast and that wife brought him [employer]. Declined Boost.  Spiritual, Cultural Beliefs, Confucianist Practices, Values that Affect Care: no  Factors Affecting Nutritional Intake: None identified at this time    Nutrition Risk Screen        MST Score: 2  Have you recently lost weight without trying?: Yes: Unsure how much  Weight loss score: 2  Have you been eating poorly because of a decreased appetite?: No  Appetite score: 0        Weight History:  Wt Readings from Last 10 Encounters:   07/26/25 81.1 kg (178 lb 12.7 oz)   07/28/25 81.1 kg (178 lb 12.7 oz)   06/03/25 99.8 kg (220 lb)   04/29/25 90.7 kg (200 lb)   04/01/25 100 kg (220 lb 7.4 oz)   09/24/24 104.9 kg (231 lb 2.4 oz)   06/18/24 92.2 kg (203 lb 4.2 oz)   03/19/24 91.4 kg (201 lb 8 oz)   11/14/23 90 kg (198 lb 6.6 oz)   09/26/23 83.3 kg (183 lb 10.3 oz)        Lab/Procedures/Meds:   Pertinent Labs Reviewed: reviewed  Pertinent Medications Reviewed: reviewed  Medications:Pertinent Medications Reviewed  Scheduled Meds:   albuterol-ipratropium  3 mL Nebulization Q6H WAKE    buprenorphine-naloxone 8-2 mg  1 Film Sublingual BID    cloNIDine  0.1 mg Oral QHS    enoxparin  1 mg/kg Subcutaneous Q12H (treatment, non-standard time)    FLUoxetine  20 mg Oral Daily    furosemide (LASIX) injection  40 mg Intravenous BID loop    nicotine  1 patch Transdermal Daily    OLANZapine  20 mg Oral QHS    OXcarbazepine  300 mg Oral BID    pantoprazole  40 mg Oral Daily    predniSONE  40 mg Oral Daily     Continuous Infusions:  PRN Meds:.  Current Facility-Administered Medications:     acetaminophen, 650 mg, Oral, Q8H PRN    acetaminophen, 650 mg, Oral, Q4H PRN    aluminum-magnesium hydroxide-simethicone, 30 mL, Oral, QID PRN    clonazePAM, 1 mg, Oral, BID PRN    dextrose 50%, 12.5 g, Intravenous, PRN    dextrose 50%, 25 g, Intravenous, PRN    glucagon (human recombinant), 1 mg, Intramuscular, PRN    glucose, 16 g, Oral, PRN    glucose, 24 g, Oral, PRN    magnesium oxide, 800 mg, Oral, PRN    magnesium oxide, 800 mg, Oral, PRN    melatonin, 6 mg, Oral, Nightly PRN    naloxone, 0.4 mg, Intravenous, PRN    ondansetron, 4 mg, Intravenous, Q6H PRN    potassium bicarbonate, 35 mEq, Oral, PRN    potassium bicarbonate, 50 mEq, Oral, PRN    potassium bicarbonate, 60 mEq, Oral, PRN    potassium, sodium phosphates, 2 packet, Oral, PRN    potassium, sodium phosphates, 2 packet, Oral, PRN    potassium, sodium  "phosphates, 2 packet, Oral, PRN    senna-docusate, 1 tablet, Oral, Daily PRN    sodium chloride 0.9%, 3 mL, Intravenous, PRN    Labs: Pertinent Labs Reviewed  Clinical Chemistry:  Recent Labs   Lab 07/26/25  1437 07/27/25  0612 07/28/25  0447    136 136   K 3.4* 3.3* 3.0*    104 100   CO2 27 27 28   GLU 95 98 106   BUN 6 5* 4*   CREATININE 0.5 0.5 0.5   CALCIUM 8.3* 8.1* 8.3*   PROT 6.3 5.7* 6.4   ALBUMIN 3.2* 2.9* 3.2*   BILITOT 2.5* 1.7* 1.8*   ALKPHOS 64 54* 64   AST 17 15 26   ALT 6* 4* 7*   ANIONGAP 7* 5* 8   MG 2.0 2.0 1.8   PHOS  --   --  2.9   LIPASE 10  --   --      CBC:   Recent Labs   Lab 07/28/25  0447   WBC 9.54   RBC 4.28*   HGB 13.2*   HCT 38.5*   *   MCV 90   MCH 30.8   MCHC 34.3     Lipid Panel:  No results for input(s): "CHOL", "HDL", "LDLCALC", "TRIG", "CHOLHDL" in the last 168 hours.  Cardiac Profile:  Recent Labs   Lab 07/26/25  1437   *   CPK 38     Inflammatory Labs:  No results for input(s): "CRP" in the last 168 hours.  Diabetes:  No results for input(s): "HGBA1C", "POCTGLUCOSE" in the last 168 hours.  Thyroid & Parathyroid:  Recent Labs   Lab 07/26/25  1437   TSH 0.656       Monitor and Evaluation  Monitor and Evaluation: Diet order, Food and beverage intake, Weight, Lipid profile, Nutrition focused physical findings, Other (see comments) (LFTs)     Discharge Planning  Nutrition Discharge Planning: Therapeutic diet (comments)  Therapeutic diet (comments): Low Sodium diet    Nutrition Risk  Level of Risk/Frequency of Follow-up:  (1 x / mg)     Nutrition Follow-Up  RD Follow-up?: Yes            "

## 2025-07-28 NOTE — PROGRESS NOTES
Pulmonary/Critical Care Progress note      Patient name: Christiano Gomez Jr.  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 He notes SOB worse w/ exertion getting worse. He feels nebulized albuterol is helpful. Pt takes lasix and spironolactone at home, sees hepatology. He has cough/tan mucous and wheezing off and on. He is a smoker, about 25 yrs. Has never had paracentesis or thoracentesis. With R pleural effusion on imaging and CT abd/p with Partially occlusive portal vein thrombus is present. Occlusive splenic vein thrombus and partially occlusive thrombus is present in the superior mesenteric vein.     7/28- pt on 3L oxygen, still with bilat effusions on cxr. Still feels dizzy when standing up, aching abd pain today    Review of Systems    Review of Systems   Constitutional: Negative.    HENT: Negative.     Eyes: Negative.    Respiratory:  Positive for cough, sputum production and shortness of breath.    Cardiovascular:  Positive for leg swelling. Negative for chest pain, palpitations, orthopnea and PND.   Gastrointestinal:  Positive for abdominal pain.   Genitourinary: Negative.    Musculoskeletal: Negative.    Skin: Negative.    Neurological: Negative.    Endo/Heme/Allergies: Negative.    Psychiatric/Behavioral: Negative.         Past Medical History    Past Medical History:   Diagnosis Date    Bipolar disorder     Cirrhosis of liver     Hepatitis C virus infection cured after antiviral drug therapy     treated / cured (SVR 6/2024)    OD (overdose of drug)     Seizures        Past Surgical History    Past Surgical History:   Procedure Laterality Date    ESOPHAGOGASTRODUODENOSCOPY N/A 6/3/2025    Procedure: EGD (ESOPHAGOGASTRODUODENOSCOPY);  Surgeon: Tony Tinajero MD;  Location: [facility];  Service:  Endoscopy;  Laterality: N/A;  ppm       Medications (scheduled):      albuterol-ipratropium  3 mL Nebulization Q6H WAKE    buprenorphine-naloxone 8-2 mg  1 Film Sublingual BID    cloNIDine  0.1 mg Oral QHS    enoxparin  1 mg/kg Subcutaneous Q12H (treatment, non-standard time)    FLUoxetine  20 mg Oral Daily    furosemide (LASIX) injection  40 mg Intravenous BID loop    nicotine  1 patch Transdermal Daily    OLANZapine  20 mg Oral QHS    OXcarbazepine  300 mg Oral BID    pantoprazole  40 mg Oral Daily    predniSONE  40 mg Oral Daily       Medications (infusions):         Medications (prn):       Current Facility-Administered Medications:     acetaminophen, 650 mg, Oral, Q8H PRN    acetaminophen, 650 mg, Oral, Q4H PRN    aluminum-magnesium hydroxide-simethicone, 30 mL, Oral, QID PRN    clonazePAM, 1 mg, Oral, BID PRN    dextrose 50%, 12.5 g, Intravenous, PRN    dextrose 50%, 25 g, Intravenous, PRN    glucagon (human recombinant), 1 mg, Intramuscular, PRN    glucose, 16 g, Oral, PRN    glucose, 24 g, Oral, PRN    magnesium oxide, 800 mg, Oral, PRN    magnesium oxide, 800 mg, Oral, PRN    melatonin, 6 mg, Oral, Nightly PRN    naloxone, 0.4 mg, Intravenous, PRN    ondansetron, 4 mg, Intravenous, Q6H PRN    potassium bicarbonate, 35 mEq, Oral, PRN    potassium bicarbonate, 50 mEq, Oral, PRN    potassium bicarbonate, 60 mEq, Oral, PRN    potassium, sodium phosphates, 2 packet, Oral, PRN    potassium, sodium phosphates, 2 packet, Oral, PRN    potassium, sodium phosphates, 2 packet, Oral, PRN    senna-docusate, 1 tablet, Oral, Daily PRN    sodium chloride 0.9%, 3 mL, Intravenous, PRN    Family History: No family history on file.    Social History: Tobacco: Tobacco Use History[1]                             EtOH:   Social History     Substance and Sexual Activity   Alcohol Use Not Currently                                Drugs:   Social History     Substance and Sexual Activity   Drug Use Not Currently       Physical  "Exam    Vital signs:  Temp:  [97.5 °F (36.4 °C)-98 °F (36.7 °C)]   Pulse:  [54-71]   Resp:  [18-19]   BP: ()/(61-88)   SpO2:  [94 %-96 %]     Intake/Output:   Intake/Output Summary (Last 24 hours) at 7/28/2025 1027  Last data filed at 7/28/2025 0608  Gross per 24 hour   Intake 960 ml   Output 750 ml   Net 210 ml        BMI: Estimated body mass index is 27.19 kg/m² as calculated from the following:    Height as of this encounter: 5' 8" (1.727 m).    Weight as of this encounter: 81.1 kg (178 lb 12.7 oz).    Physical Exam  Constitutional:       General: He is not in acute distress.     Appearance: Normal appearance. He is not ill-appearing.   HENT:      Head: Normocephalic and atraumatic.      Right Ear: External ear normal.      Left Ear: External ear normal.      Nose: Nose normal.      Mouth/Throat:      Mouth: Mucous membranes are moist.      Pharynx: Oropharynx is clear.   Eyes:      Extraocular Movements: Extraocular movements intact.      Pupils: Pupils are equal, round, and reactive to light.   Cardiovascular:      Rate and Rhythm: Normal rate and regular rhythm.      Pulses: Normal pulses.      Heart sounds: Normal heart sounds.   Pulmonary:      Breath sounds: Wheezing present.      Comments: Bilat wheezes, RLL absent breath sounds  Abdominal:      General: Abdomen is flat. Bowel sounds are normal. There is no distension.      Palpations: Abdomen is soft.      Tenderness: There is no abdominal tenderness.   Musculoskeletal:         General: Normal range of motion.      Cervical back: No rigidity or tenderness.      Right lower leg: No edema.      Left lower leg: No edema.   Lymphadenopathy:      Cervical: No cervical adenopathy.   Skin:     General: Skin is warm and dry.      Coloration: Skin is not jaundiced.      Findings: No bruising or erythema.      Comments: Chronic venous stasis changes BLE  Clubbing bilat fingernails   Neurological:      General: No focal deficit present.      Mental Status: He " "is alert and oriented to person, place, and time.      Cranial Nerves: No cranial nerve deficit.      Motor: No weakness.   Psychiatric:         Mood and Affect: Mood normal.         Behavior: Behavior normal.         Thought Content: Thought content normal.         Judgment: Judgment normal.         Laboratory    Recent Labs   Lab 07/28/25 0447   WBC 9.54   RBC 4.28*   HGB 13.2*   HCT 38.5*   *   MCV 90   MCH 30.8   MCHC 34.3       Recent Labs   Lab 07/28/25 0447   CALCIUM 8.3*   ALBUMIN 3.2*   PROT 6.4      K 3.0*   CO2 28      BUN 4*   CREATININE 0.5   ALKPHOS 64   ALT 7*   AST 26   BILITOT 1.8*       No results for input(s): "PT", "INR", "APTT" in the last 24 hours.      No results for input(s): "CPK", "CPKMB", "TROPONINI", "MB" in the last 24 hours.      Additional labs:     Microbiology:       Microbiology Results (last 7 days)       Procedure Component Value Units Date/Time    Blood culture x two cultures. Draw prior to antibiotics. [[id number]]  (Normal) Collected: 07/26/25 1453    Order Status: Completed Specimen: Blood from Peripheral, Forearm, Right Updated: 07/28/25 0404     CULTURE, BLOOD ([facility]) No Growth After 36 Hours    Blood culture x two cultures. Draw prior to antibiotics. [[id number]]  (Normal) Collected: 07/26/25 1502    Order Status: Completed Specimen: Blood from Peripheral, Hand, Left Updated: 07/28/25 0404     CULTURE, BLOOD ([facility]) No Growth After 36 Hours    Influenza A & B by Molecular [[id number]]  (Normal) Collected: 07/26/25 1444    Order Status: Completed Specimen: Nasal Swab Updated: 07/26/25 1534     INFLUENZA A MOLECULAR Negative     INFLUENZA B MOLECULAR  Negative    Group A Strep, Molecular [[id number]]  (Normal) Collected: 07/26/25 1444    Order Status: Completed Specimen: Throat Updated: 07/26/25 1516     Group A Strep Molecular Negative            Radiology    X-Ray Chest AP Portable  Narrative: CLINICAL HISTORY:  ([id number] y/o  (1975) M    pleural " effusion;    TECHNIQUE:  (A#[id number], exam time 7/28/2025 6:29)    XR CHEST AP PORTABLE [id number]    COMPARISON:  Radiograph from 07/26/2025.    FINDINGS:  Airspace opacity at the right greater than left lung base compatible with a moderate right and small left pleural effusion and adjacent atelectasis/consolidation, slightly worse from the previous. No pneumothorax is identified. The heart is normal in size. Osseous structures appear within normal limits. The visualized upper abdomen is unremarkable.  Impression: Slight interval worsening right greater than left layering pleural effusions and adjacent atelectasis/consolidation.    Electronically signed by: Markus Pinzon  Date:    07/28/2025  Time:    06:30        Additional Studies        Ventilator Information    Oxygen Concentration (%):  [32] 32             Recent Labs     07/26/25  1751   PH 7.429   PCO2 34.9*   PO2 69*   HCO3 23.1*   POCSATURATED 94   BE -1         Impression    Active Hospital Problems    Diagnosis  POA    *Acute respiratory failure [J96.00]  Yes    Bilateral pleural effusion [J90]  Yes    Portal vein thrombosis [I81]  Yes    Splenic vein thrombosis [I82.890]  Yes    Superior mesenteric vein thrombosis [K55.069]  Yes    COPD exacerbation [J44.1]  Yes    Thrombocytopenia [D69.6]  Yes    Hypokalemia [E87.6]  Yes    Cirrhosis [K74.60]  Yes    Seizures [R56.9]  Yes    Polysubstance abuse [F19.10]  Yes    Bipolar affective disorder [F31.9]  Yes      Resolved Hospital Problems   No resolved problems to display.       Plan    - suspect R pleural effusion related to portal HTN, cirrhosis  -  Lasix 40mg IV repeat today  - echo to eval for shunt/pulmonary HTN given clubbing  - continue anticoagulation given portal vein, splenic vein thrombosis  - continue nebs, prednisone for suspected COPD exacerbation  - needs to quit smoking  - hold PM and tomorrow am doses of lovenox for thoracentesis  - IR thoracentesis, ok to do tomorrow, pleural fluid  studies    Thank you for this consult.  I will follow with you while the patient is hospitalized.      Christy Meléndez MD  Pulmonary & Critical Care Medicine                   [1]   Social History  Tobacco Use   Smoking Status Every Day   Smokeless Tobacco Not on file

## 2025-07-28 NOTE — SUBJECTIVE & OBJECTIVE
Interval History:  Patient is seen and examined during multidisciplinary rounds.  Patient is currently on 3 L/min supplemental oxygen via nasal cannula.  Chest x-ray showing worsening of right large pleural effusion.  Case discussed with Dr. Gilmore who is recommending ultrasound-guided thoracentesis.  2D echocardiogram pending.  Patient is currently afebrile.  Denies any focal subjective complaints.    Objective:     Vital Signs (Most Recent):  Temp: 97.7 °F (36.5 °C) (07/28/25 0535)  Pulse: 62 (07/28/25 0535)  Resp: 19 (07/28/25 0535)  BP: 97/61 (07/28/25 0535)  SpO2: 95 % (07/28/25 0535) Vital Signs (24h Range):  Temp:  [97.5 °F (36.4 °C)-98 °F (36.7 °C)] 97.7 °F (36.5 °C)  Pulse:  [51-71] 62  Resp:  [18-19] 19  SpO2:  [94 %-96 %] 95 %  BP: ()/(60-88) 97/61     Weight: 81.1 kg (178 lb 12.7 oz)  Body mass index is 27.19 kg/m².    Intake/Output Summary (Last 24 hours) at 7/28/2025 0718  Last data filed at 7/28/2025 0608  Gross per 24 hour   Intake 1080 ml   Output 750 ml   Net 330 ml         Physical Exam  Vitals reviewed.   Constitutional:       General: He is not in acute distress.  HENT:      Head: Normocephalic and atraumatic.   Cardiovascular:      Rate and Rhythm: Normal rate and regular rhythm.   Pulmonary:      Effort: Pulmonary effort is normal. No respiratory distress.      Breath sounds: No wheezing, rhonchi or rales.      Comments: Decreased breath sounds right mid to lower lung field and at left base  Skin:     General: Skin is warm and dry.      Comments: Clubbed fingers  Hyperpigmented skin changes distal BLE   Neurological:      General: No focal deficit present.      Mental Status: He is alert and oriented to person, place, and time. Mental status is at baseline.   Psychiatric:         Mood and Affect: Affect normal.         Behavior: Behavior normal.               Significant Labs:   Lab Results   Component Value Date    WBC 6.18 07/27/2025    HGB 12.4 (L) 07/27/2025    HCT 36.7 (L)  07/27/2025    MCV 92 07/27/2025     (L) 07/27/2025       CMP  Sodium   Date Value Ref Range Status   07/28/2025 136 136 - 145 mmol/L Final     Potassium   Date Value Ref Range Status   07/28/2025 3.0 (L) 3.5 - 5.1 mmol/L Final     Chloride   Date Value Ref Range Status   07/28/2025 100 95 - 110 mmol/L Final     CO2   Date Value Ref Range Status   07/28/2025 28 23 - 29 mmol/L Final     Glucose   Date Value Ref Range Status   07/28/2025 106 70 - 110 mg/dL Final     BUN   Date Value Ref Range Status   07/28/2025 4 (L) 6 - 20 mg/dL Final     Creatinine   Date Value Ref Range Status   07/28/2025 0.5 0.5 - 1.4 mg/dL Final     Calcium   Date Value Ref Range Status   07/28/2025 8.3 (L) 8.7 - 10.5 mg/dL Final     Protein Total   Date Value Ref Range Status   07/28/2025 6.4 6.0 - 8.4 gm/dL Final     Albumin   Date Value Ref Range Status   07/28/2025 3.2 (L) 3.5 - 5.2 g/dL Final     Bilirubin Total   Date Value Ref Range Status   07/28/2025 1.8 (H) 0.1 - 1.0 mg/dL Final     Comment:     For infants and newborns, interpretation of results should be based   on gestational age, weight and in agreement with clinical   observations.    Premature Infant recommended reference ranges:   0-24 hours:  <8.0 mg/dL   24-48 hours: <12.0 mg/dL   3-5 days:    <15.0 mg/dL   6-29 days:   <15.0 mg/dL     ALP   Date Value Ref Range Status   07/28/2025 64 55 - 135 unit/L Final     AST   Date Value Ref Range Status   07/28/2025 26 10 - 40 unit/L Final     ALT   Date Value Ref Range Status   07/28/2025 7 (L) 10 - 44 unit/L Final     Anion Gap   Date Value Ref Range Status   07/28/2025 8 8 - 16 mmol/L Final     eGFR   Date Value Ref Range Status   07/28/2025 >60 >60 mL/min/1.73/m2 Final   09/24/2024 >60 >60 mL/min/1.73 m^2 Final     Microbiology Results (last 7 days)       Procedure Component Value Units Date/Time    Blood culture x two cultures. Draw prior to antibiotics. [[id number]]  (Normal) Collected: 07/26/25 1453    Order Status:  Completed Specimen: Blood from Peripheral, Forearm, Right Updated: 07/28/25 0404     CULTURE, BLOOD ([facility]) No Growth After 36 Hours    Blood culture x two cultures. Draw prior to antibiotics. [[id number]]  (Normal) Collected: 07/26/25 1502    Order Status: Completed Specimen: Blood from Peripheral, Hand, Left Updated: 07/28/25 0404     CULTURE, BLOOD ([facility]) No Growth After 36 Hours    Influenza A & B by Molecular [[id number]]  (Normal) Collected: 07/26/25 1444    Order Status: Completed Specimen: Nasal Swab Updated: 07/26/25 1534     INFLUENZA A MOLECULAR Negative     INFLUENZA B MOLECULAR  Negative    Group A Strep, Molecular [[id number]]  (Normal) Collected: 07/26/25 1444    Order Status: Completed Specimen: Throat Updated: 07/26/25 1516     Group A Strep Molecular Negative          Significant Imaging:  CXR:  Small bilateral pleural effusions and adjacent atelectasis/consolidation, new/worse from the previous exam.     CTA Chest:  1. No evidence of pulmonary embolism.  2. Large right and small left pleural effusions with areas of linear atelectasis versus scarring involving the lungs bilaterally.    CT abdomen and pelvis with IV contrast:  1. Cirrhosis and portal hypertension with splenomegaly and upper abdominal and paraesophageal varices.  2. Partially occlusive portal vein thrombus is present.  Occlusive splenic vein thrombus and partially occlusive thrombus is present in the superior mesenteric vein.    US abdomen:  Cirrhosis, splenomegaly, and bilateral pleural effusions.     CXR:  Slight interval worsening right greater than left layering pleural effusions and adjacent atelectasis/consolidation.

## 2025-07-28 NOTE — ASSESSMENT & PLAN NOTE
Patient with known Cirrhosis with Child's class B. Co-morbidities are present and inclusive of portal hypertension, esophageal varices, portal venous thrombosis, malnutrition, and anemia/thrombocytopenia.  MELD-Na score calculated; MELD 3.0: 12 at 7/28/2025  4:47 AM  MELD-Na: 13 at 7/28/2025  4:47 AM  Calculated from:  Serum Creatinine: 0.5 mg/dL (Using min of 1 mg/dL) at 7/28/2025  4:47 AM  Serum Sodium: 136 mmol/L at 7/28/2025  4:47 AM  Total Bilirubin: 1.8 mg/dL at 7/28/2025  4:47 AM  Serum Albumin: 3.2 g/dL at 7/28/2025  4:47 AM  INR(ratio): 1.3 at 7/26/2025  2:37 PM  Age at listing (hypothetical): 50 years  Sex: Male at 7/28/2025  4:47 AM    Continue chronic meds. Etiology likely Hepatitis C which was cured. Will avoid any hepatotoxic meds, and monitor CBC/CMP/INR for synthetic function.  Continue home lasix. Follows with hepatology and has had recent EGD

## 2025-07-28 NOTE — PLAN OF CARE
07/28/25 0740   Patient Assessment/Suction   Level of Consciousness (AVPU) alert   Respiratory Effort Normal;Unlabored   Expansion/Accessory Muscles/Retractions no use of accessory muscles   All Lung Fields Breath Sounds Anterior:;Lateral:;diminished   JARAD Breath Sounds diminished   LLL Breath Sounds diminished   RUL Breath Sounds diminished   RML Breath Sounds diminished   RLL Breath Sounds diminished   Rhythm/Pattern, Respiratory pattern regular   Cough Frequency infrequent   Cough Type nonproductive   Skin Integrity   $ Wound Care Tech Time 15 min   Area Observed Left;Right;Behind ear;Cheek;Upper lip;Nares   Skin Appearance without discoloration   PRE-TX-O2   Device (Oxygen Therapy) nasal cannula   $ Is the patient on Low Flow Oxygen? Yes   Flow (L/min) (Oxygen Therapy) 3   Oxygen Concentration (%) 32   SpO2 95 %   Pulse Oximetry Type Intermittent   $ Pulse Oximetry - Multiple Charge Pulse Oximetry - Multiple   Pulse 60   Resp 18   Aerosol Therapy   $ Aerosol Therapy Charges Aerosol Treatment  (duoneb)   Daily Review of Necessity (SVN) completed   Respiratory Treatment Status (SVN) given   Treatment Route (SVN) mask;oxygen   Patient Position HOB elevated   Post Treatment Assessment (SVN) breath sounds improved   Signs of Intolerance (SVN) none   Breath Sounds Post-Respiratory Treatment   Throughout All Fields Post-Treatment All Fields   Throughout All Fields Post-Treatment aeration increased   Post-treatment Heart Rate (beats/min) 64   Post-treatment Resp Rate (breaths/min) 18   General Safety Checklist   Safety Promotion/Fall Prevention side rails raised   Respiratory Interventions   Cough And Deep Breathing done with encouragement   Ready to Wean/Extubation Screen   FIO2<=50 (chart decimal) 0.32   Education   $ Education Bronchodilator;Oxygen;15 min

## 2025-07-28 NOTE — ASSESSMENT & PLAN NOTE
Seen on CT imaging.  Generally asymptomatic  -vascular surgery consult, recommended anticoagulation  -start therapeutic Lovenox now.  Likely DOAC on discharge, being held for the need for thoracentesis.  -monitor platelets  Seen on CT imaging.  Generally asymptomatic  -vascular surgery consult, recommended anticoagulation  -start therapeutic Lovenox now.  Likely DOAC on discharge  -monitor platelets  Seen on CT imaging.  Generally asymptomatic  -vascular surgery consult, recommended anticoagulation  -start therapeutic Lovenox now.  Likely DOAC on discharge  -monitor platelets

## 2025-07-28 NOTE — CONSULTS
[facility]  Consult Note      PATIENT NAME: Christiano Gomez Jr.  MRN: [id number]  TODAY'S DATE: 07/28/2025  ADMIT DATE: 7/26/2025                          CONSULT REQUESTED BY: Oly Santos MD    SUBJECTIVE   PRINCIPAL PROBLEM: Acute respiratory failure    Reason for Consult/Chief Complaint: Thrombus portal vein, splenic vein, and SMV     History of Present Illness:  Christiano Gomez Jr. is a 50 y.o. male with a history of cirrhosis and mood disorders who presents to the emergency department on 7/26/2025 with complaints of fatigue, wheezing, and dyspnea. Patient found to be markedly wheezy on admission and hypoxic requiring 2 L nasal cannula. CT chest and CXR illustrated bilateral pleural effusions and infiltrates. He was admitted to Hospital Medicine for evaluation and treatment of community-acquired pneumonia and COPD exacerbation with antibiotics, steroids and nebulizers. A CT abdomen and pelvis was also completed which illustrated a partially occlusive portal vein thrombus, occlusive splenic vein thrombus, and partially occlusive thrombus is present in the superior mesenteric vein. He was started on therapeutic lovenox and Vascular Surgery was consulted.  The patient reports that he has baseline abdominal discomfort but denies any new symptoms and is tolerating eating and drinking as normal.     Past Medical History:   Diagnosis Date    Bipolar disorder     Cirrhosis of liver     Hepatitis C virus infection cured after antiviral drug therapy     treated / cured (SVR 6/2024)    OD (overdose of drug)     Seizures      Past Surgical History:   Procedure Laterality Date    ESOPHAGOGASTRODUODENOSCOPY N/A 6/3/2025    Procedure: EGD (ESOPHAGOGASTRODUODENOSCOPY);  Surgeon: Tony Tinajero MD;  Location: [facility];  Service: Endoscopy;  Laterality: N/A;  ppm     Review of patient's allergies indicates:  No Known Allergies  No family history on file.  Social History[1]     Review of  Systems:  Review of Systems   Constitutional:  Negative for chills and fever.   Respiratory:  Positive for shortness of breath and wheezing.    Cardiovascular:  Negative for chest pain.   Gastrointestinal:  Positive for abdominal pain. Negative for nausea and vomiting.       OBJECTIVE   VITAL SIGNS (Most Recent)  Temp: 97.6 °F (36.4 °C) (07/28/25 0756)  Pulse: 61 (07/28/25 0756)  Resp: 18 (07/28/25 0756)  BP: 100/65 (07/28/25 0756)  SpO2: 95 % (07/28/25 0756)    I & O (Last 24H):  Intake/Output Summary (Last 24 hours) at 7/28/2025 0944  Last data filed at 7/28/2025 0608  Gross per 24 hour   Intake 960 ml   Output 750 ml   Net 210 ml       PHYSICAL EXAM  Constitutional: Awake and oriented to person, place, and time. Appears well-developed and well-nourished. In no apparent distress.  HEENT: Normocephalic. Pupils normal and conjunctivae normal. Mucous membranes normal.  Neck: Normal range of motion. Neck supple.  Cardiovascular: Normal rate, regular rhythm and normal heart sounds. S1, S2.   Pulmonary/Chest: Nasal cannula in place. Effort normal and breath sounds diminished to auscultation. No respiratory distress.  Abdominal: Soft. Rounded. Bowel sounds are normal. There is no tenderness to palpation.   Urinary: No sanchez catheter present  Skin: Skin is warm and dry. Yellow tint to skin.   Extremities: No cyanosis or erythema noted at this time. Venous stasis. Clubbed fingers.   Peripheral vascular system: Good palpable distal pulses.     INPATIENT MEDS:      albuterol-ipratropium  3 mL Nebulization Q6H WAKE    buprenorphine-naloxone 8-2 mg  1 Film Sublingual BID    cloNIDine  0.1 mg Oral QHS    enoxparin  1 mg/kg Subcutaneous Q12H (treatment, non-standard time)    FLUoxetine  20 mg Oral Daily    furosemide (LASIX) injection  40 mg Intravenous BID loop    nicotine  1 patch Transdermal Daily    OLANZapine  20 mg Oral QHS    OXcarbazepine  300 mg Oral BID    pantoprazole  40 mg Oral Daily    predniSONE  40 mg Oral Daily        Current Facility-Administered Medications:     acetaminophen, 650 mg, Oral, Q8H PRN    acetaminophen, 650 mg, Oral, Q4H PRN    aluminum-magnesium hydroxide-simethicone, 30 mL, Oral, QID PRN    clonazePAM, 1 mg, Oral, BID PRN    dextrose 50%, 12.5 g, Intravenous, PRN    dextrose 50%, 25 g, Intravenous, PRN    glucagon (human recombinant), 1 mg, Intramuscular, PRN    glucose, 16 g, Oral, PRN    glucose, 24 g, Oral, PRN    magnesium oxide, 800 mg, Oral, PRN    magnesium oxide, 800 mg, Oral, PRN    melatonin, 6 mg, Oral, Nightly PRN    naloxone, 0.4 mg, Intravenous, PRN    ondansetron, 4 mg, Intravenous, Q6H PRN    potassium bicarbonate, 35 mEq, Oral, PRN    potassium bicarbonate, 50 mEq, Oral, PRN    potassium bicarbonate, 60 mEq, Oral, PRN    potassium, sodium phosphates, 2 packet, Oral, PRN    potassium, sodium phosphates, 2 packet, Oral, PRN    potassium, sodium phosphates, 2 packet, Oral, PRN    senna-docusate, 1 tablet, Oral, Daily PRN    sodium chloride 0.9%, 3 mL, Intravenous, PRN       LABS AND DIAGNOSTICS   CURRENT/PREVIOUS VISIT EKG    CARDIAC PROFILE LAST 3 DAYS  Recent Labs   Lab 07/26/25  1437   *   CPK 38       CBC LAST 3 DAYS  Recent Labs   Lab 07/26/25  1437 07/27/25  0612 07/28/25  0447   WBC 8.54 6.18 9.54   HGB 13.6* 12.4* 13.2*   HCT 38.9* 36.7* 38.5*   * 101* 109*       COAGULATION LAST 3 DAYS  Recent Labs   Lab 07/26/25  1437   INR 1.3*   APTT 29.3       CHEMISTRY LAST 3 DAYS  Recent Labs   Lab 07/26/25  1437 07/26/25  1751 07/27/25  0612 07/28/25  0447     --  136 136   K 3.4*  --  3.3* 3.0*   CO2 27  --  27 28   ANIONGAP 7*  --  5* 8   BUN 6  --  5* 4*   CREATININE 0.5  --  0.5 0.5   GLU 95  --  98 106   CALCIUM 8.3*  --  8.1* 8.3*   PH  --  7.429  --   --    MG 2.0  --  2.0 1.8   ALBUMIN 3.2*  --  2.9* 3.2*   PROT 6.3  --  5.7* 6.4   ALKPHOS 64  --  54* 64   ALT 6*  --  4* 7*   AST 17  --  15 26   BILITOT 2.5*  --  1.7* 1.8*       MOST RECENT IMAGING  X-Ray Chest AP  Portable  Narrative: CLINICAL HISTORY:  ([id number])51 y/o  (1975) M    pleural effusion;    TECHNIQUE:  (A#[id number], exam time 7/28/2025 6:29)    XR CHEST AP PORTABLE [id number]    COMPARISON:  Radiograph from 07/26/2025.    FINDINGS:  Airspace opacity at the right greater than left lung base compatible with a moderate right and small left pleural effusion and adjacent atelectasis/consolidation, slightly worse from the previous. No pneumothorax is identified. The heart is normal in size. Osseous structures appear within normal limits. The visualized upper abdomen is unremarkable.  Impression: Slight interval worsening right greater than left layering pleural effusions and adjacent atelectasis/consolidation.    Electronically signed by: Markus Pinzon  Date:    07/28/2025  Time:    06:30    Results for orders placed or performed during the hospital encounter of 07/26/25 (from the past 2160 hours)   CT Abdomen Pelvis With IV Contrast NO Oral Contrast    Narrative    EXAMINATION:  CT ABDOMEN PELVIS WITH IV CONTRAST    CLINICAL HISTORY:  Epigastric pain;    TECHNIQUE:  Low dose axial images, sagittal and coronal reformations were obtained from the lung bases to the pubic symphysis following the IV administration of 100 mL of Omnipaque 350.    COMPARISON:  CT abdomen pelvis from 04/29/2025.    FINDINGS:  Lung bases: For findings regarding the lungs, see dedicated concurrent CTA chest.    Liver: Nodular contour of the liver is present consistent with cirrhosis.    Gallbladder: There is cholelithiasis without evidence of cholecystitis.    Bile Ducts: No evidence of intra or extra hepatic biliary ductal dilation.    Spleen: The spleen is enlarged measuring 16.3 cm.    Kidneys: No renal mass, calcification, or hydronephrosis.    Adrenals: Unremarkable.    Pancreas: No mass or peripancreatic fat stranding.    Bowel: No evidence of bowel obstruction or inflammation. The appendix is unremarkable.    Lymph nodes: No evidence  of lymphadenopathy involving the abdomen or pelvis.    Vascular: The abdominal aorta is normal in diameter.    Pelvic organs: No evidence of pelvic mass.    Urinary Bladder: Unremarkable.    Bones:  No evidence of acute osseous process.    Abdominal wall:  Unremarkable.    Other: Upper abdominal varices and paraesophageal varices are present.      Impression    1. Cirrhosis and portal hypertension with splenomegaly and upper abdominal and paraesophageal varices.  2. Partially occlusive portal vein thrombus is present.  Occlusive splenic vein thrombus and partially occlusive thrombus is present in the superior mesenteric vein.  This report was flagged in Epic as abnormal.      Electronically signed by: Mac Dixon  Date:    07/26/2025  Time:    18:41   CTA Chest Non-Coronary (PE Studies)    Narrative    EXAMINATION:  CTA CHEST NON CORONARY (PE STUDIES)    CLINICAL HISTORY:  Pulmonary embolism (PE) suspected, high prob;    TECHNIQUE:  Low dose axial images, sagittal and coronal reformations were obtained from the thoracic inlet to the lung bases following the IV administration of 100 mL of Omnipaque 350.  Contrast timing was optimized to evaluate the pulmonary arteries.  MIP images were performed.    COMPARISON:  CT chest from 09/18/2023.    FINDINGS:  Base of Neck: No significant abnormality.    Lungs/Pleura: The airways are patent. No worrisome pulmonary nodules or focal consolidations are identified.  Large right and small left pleural effusions are present.  Bilateral areas of linear atelectasis versus scarring are present involving the lungs bilaterally.  Rounded atelectasis is noted involving the left lower lobe.    Pulmonary vasculature: No evidence of pulmonary embolism.    Aorta: The thoracic aorta is normal in diameter.    Heart: The heart is normal in size.  No evidence of atherosclerotic calcification involving the coronary arteries.  No pericardial effusion.    Lymph nodes: No evidence of  lymphadenopathy involving the chest.    Esophagus: Normal.    Upper Abdomen: For findings regarding the abdomen, see dedicated concurrent CT abdomen pelvis.    Bones: A compression deformity involving T9 is redemonstrated.  No evidence of acute osseous process.    Body wall: Unremarkable.    Other: None.      Impression    1. No evidence of pulmonary embolism.  2. Large right and small left pleural effusions with areas of linear atelectasis versus scarring involving the lungs bilaterally.      Electronically signed by: Mac Dixon  Date:    07/26/2025  Time:    18:28        ASSESSMENT/PLAN:     Active Hospital Problems    Diagnosis    *Acute respiratory failure    Bilateral pleural effusion    Portal vein thrombosis    Splenic vein thrombosis    Superior mesenteric vein thrombosis    COPD exacerbation    Thrombocytopenia    Hypokalemia    Cirrhosis    Seizures    Polysubstance abuse    Bipolar affective disorder     # Portal vein thrombosis  # Splenic vein thrombosis  # SMV thrombosis  - CT abdomen pelvis 7/26/2025: Partially occlusive portal vein thrombus is present. Occlusive splenic vein thrombus and partially occlusive thrombus is present in the superior mesenteric vein.   - No indication for intervention or additional imaging at this time  - Currently on therapeutic lovenox. Would recommend continuing with eventual switch to DOAC prior to discharge.     Vascular surgery will sign off. Please don't hesitate to re-consult if any additional needs arise. Thank you.     Case and plan of care discussed with MD. Additional recommendations from Dr. Mandel to follow.     Emma Baig NP  [facility]   Date of Service: 07/28/2025          [1]   Social History  Tobacco Use    Smoking status: Every Day   Vaping Use    Vaping status: Never Used   Substance Use Topics    Alcohol use: Not Currently    Drug use: Not Currently

## 2025-07-28 NOTE — ASSESSMENT & PLAN NOTE
Patient with Hypoxic Respiratory failure which is Acute.  he is not on home oxygen. Supplemental oxygen was provided and noted- Oxygen Concentration (%):  [32] 32    Signs/symptoms of respiratory failure include- wheezing and hypoxia. Contributing diagnoses includes - COPD and Pleural effusion Labs and images were reviewed. Patient Has recent ABG, which has been reviewed. Will treat underlying causes and adjust management of respiratory failure as follows-  steroids, nebulizers.  Follow Pulmonary recommendations.  Patient to undergo right-sided thoracentesis.  Hold evening and morning dose of Lovenox.

## 2025-07-28 NOTE — PLAN OF CARE
07/28/25 1028   Rounds   Attendance Provider;Pharmacist;Nurse ;Assigned nurse   Discharge Plan A Home with family

## 2025-07-28 NOTE — PLAN OF CARE
Problem: COPD (Chronic Obstructive Pulmonary Disease)  Goal: Optimal Level of Functional [place]  Outcome: Progressing  Goal: Absence of Infection Signs and Symptoms  Outcome: Progressing     Problem: Adult Inpatient Plan of Care  Goal: Patient-Specific Goal (Individualized)  Outcome: Progressing  Goal: Absence of Hospital-Acquired Illness or Injury  Outcome: Progressing     Problem: Sepsis/Septic Shock  Goal: Optimal Coping  Outcome: Progressing  Goal: Absence of Infection Signs and Symptoms  Outcome: Progressing     Problem: Pneumonia  Goal: Fluid Balance  Outcome: Progressing  Goal: Effective Oxygenation and Ventilation  Outcome: Progressing

## 2025-07-28 NOTE — PROGRESS NOTES
[facility]  Progress Note    Patient Name: Christiano Gomez Jr.  MRN: [id number]  Patient Class: OP- Observation   Admission Date: 7/26/2025  Length of Stay: 0 days  Attending Physician: Deejay Lund MD  Primary Care Provider: Viki, Provider        Subjective     Principal Problem:Acute respiratory failure        HPI:  This is a pleasant 50-year-old gentleman with comorbid conditions of cirrhosis, mood disorder who presents to the emergency department with complaints of fatigue, wheezing and dyspnea.  Patient found to be markedly wheezy on admission as well as hypoxic requiring 2 L nasal cannula.  Imaging showing bilateral pleural effusions and infiltrate.  He is initiated on therapy for community-acquired pneumonia and COPD exacerbation with antibiotics, steroids and nebulizers.  Admission to Hospital Medicine.    Overview/Hospital Course:  50-year-old male with PMH per HPI is admitted due to acute hypoxic respiratory failure attributed to COPD exacerbation and a large right pleural effusion.  Also has a small left pleural effusion.  He required nasal cannula oxygen support.  Also found to have thrombosis in the portal vein, splenic vein, and superior mesenteric vein.  Initially given antibiotics for concern for pneumonia but this was ruled out and antibiotics stopped with CT imaging showing more likely atelectasis or scarring.  Treated with nebulized breathing treatments and corticosteroids.  Started on therapeutic Lovenox.  Consults to pulmonology and vascular surgery.  Vascular surgery recommended anticoagulation for his thromboses without other intervention.    Interval History:  Seen on a.m. rounds.  Difficulty breathing is similar.  Denies any new abdominal symptoms.      Objective:     Vital Signs (Most Recent):  Temp: 97.7 °F (36.5 °C) (07/28/25 0535)  Pulse: 62 (07/28/25 0535)  Resp: 19 (07/28/25 0535)  BP: 97/61 (07/28/25 0535)  SpO2: 95 % (07/28/25 0535) Vital Signs  (24h Range):  Temp:  [97.5 °F (36.4 °C)-98 °F (36.7 °C)] 97.7 °F (36.5 °C)  Pulse:  [51-71] 62  Resp:  [18-19] 19  SpO2:  [94 %-96 %] 95 %  BP: ()/(60-88) 97/61     Weight: 81.1 kg (178 lb 12.7 oz)  Body mass index is 27.19 kg/m².    Intake/Output Summary (Last 24 hours) at 7/28/2025 0718  Last data filed at 7/28/2025 0608  Gross per 24 hour   Intake 1080 ml   Output 750 ml   Net 330 ml         Physical Exam  Vitals reviewed.   Constitutional:       General: He is not in acute distress.  HENT:      Head: Normocephalic and atraumatic.   Cardiovascular:      Rate and Rhythm: Normal rate and regular rhythm.   Pulmonary:      Effort: Pulmonary effort is normal. No respiratory distress.      Breath sounds: Wheezing present. No rhonchi or rales.      Comments: Decreased breath sounds right mid to lower lung field and at left base  Skin:     General: Skin is warm and dry.      Comments: Clubbed fingers  Hyperpigmented skin changes distal BLE   Neurological:      General: No focal deficit present.      Mental Status: He is alert and oriented to person, place, and time. Mental status is at baseline.   Psychiatric:         Mood and Affect: Affect normal.         Behavior: Behavior normal.               Significant Labs:   Lab Results   Component Value Date    WBC 6.18 07/27/2025    HGB 12.4 (L) 07/27/2025    HCT 36.7 (L) 07/27/2025    MCV 92 07/27/2025     (L) 07/27/2025       CMP  Sodium   Date Value Ref Range Status   07/28/2025 136 136 - 145 mmol/L Final     Potassium   Date Value Ref Range Status   07/28/2025 3.0 (L) 3.5 - 5.1 mmol/L Final     Chloride   Date Value Ref Range Status   07/28/2025 100 95 - 110 mmol/L Final     CO2   Date Value Ref Range Status   07/28/2025 28 23 - 29 mmol/L Final     Glucose   Date Value Ref Range Status   07/28/2025 106 70 - 110 mg/dL Final     BUN   Date Value Ref Range Status   07/28/2025 4 (L) 6 - 20 mg/dL Final     Creatinine   Date Value Ref Range Status   07/28/2025 0.5  0.5 - 1.4 mg/dL Final     Calcium   Date Value Ref Range Status   07/28/2025 8.3 (L) 8.7 - 10.5 mg/dL Final     Protein Total   Date Value Ref Range Status   07/28/2025 6.4 6.0 - 8.4 gm/dL Final     Albumin   Date Value Ref Range Status   07/28/2025 3.2 (L) 3.5 - 5.2 g/dL Final     Bilirubin Total   Date Value Ref Range Status   07/28/2025 1.8 (H) 0.1 - 1.0 mg/dL Final     Comment:     For infants and newborns, interpretation of results should be based   on gestational age, weight and in agreement with clinical   observations.    Premature Infant recommended reference ranges:   0-24 hours:  <8.0 mg/dL   24-48 hours: <12.0 mg/dL   3-5 days:    <15.0 mg/dL   6-29 days:   <15.0 mg/dL     ALP   Date Value Ref Range Status   07/28/2025 64 55 - 135 unit/L Final     AST   Date Value Ref Range Status   07/28/2025 26 10 - 40 unit/L Final     ALT   Date Value Ref Range Status   07/28/2025 7 (L) 10 - 44 unit/L Final     Anion Gap   Date Value Ref Range Status   07/28/2025 8 8 - 16 mmol/L Final     eGFR   Date Value Ref Range Status   07/28/2025 >60 >60 mL/min/1.73/m2 Final   09/24/2024 >60 >60 mL/min/1.73 m^2 Final     Microbiology Results (last 7 days)       Procedure Component Value Units Date/Time    Blood culture x two cultures. Draw prior to antibiotics. [[id number]]  (Normal) Collected: 07/26/25 1453    Order Status: Completed Specimen: Blood from Peripheral, Forearm, Right Updated: 07/28/25 0404     CULTURE, BLOOD ([facility]) No Growth After 36 Hours    Blood culture x two cultures. Draw prior to antibiotics. [[id number]]  (Normal) Collected: 07/26/25 1502    Order Status: Completed Specimen: Blood from Peripheral, Hand, Left Updated: 07/28/25 0404     CULTURE, BLOOD ([facility]) No Growth After 36 Hours    Influenza A & B by Molecular [[id number]]  (Normal) Collected: 07/26/25 1444    Order Status: Completed Specimen: Nasal Swab Updated: 07/26/25 1534     INFLUENZA A MOLECULAR Negative     INFLUENZA B MOLECULAR  Negative    Group A  Strep, Molecular [[id number]]  (Normal) Collected: 07/26/25 1444    Order Status: Completed Specimen: Throat Updated: 07/26/25 1516     Group A Strep Molecular Negative          Significant Imaging:  CXR:  Small bilateral pleural effusions and adjacent atelectasis/consolidation, new/worse from the previous exam.     CTA Chest:  1. No evidence of pulmonary embolism.  2. Large right and small left pleural effusions with areas of linear atelectasis versus scarring involving the lungs bilaterally.    CT abdomen and pelvis with IV contrast:  1. Cirrhosis and portal hypertension with splenomegaly and upper abdominal and paraesophageal varices.  2. Partially occlusive portal vein thrombus is present.  Occlusive splenic vein thrombus and partially occlusive thrombus is present in the superior mesenteric vein.    US abdomen:  Cirrhosis, splenomegaly, and bilateral pleural effusions.     CXR:  Slight interval worsening right greater than left layering pleural effusions and adjacent atelectasis/consolidation.       Assessment & Plan  Acute respiratory failure  Patient with Hypoxic Respiratory failure which is Acute.  he is not on home oxygen. Supplemental oxygen was provided and noted- Oxygen Concentration (%):  [32] 32    Signs/symptoms of respiratory failure include- wheezing and hypoxia. Contributing diagnoses includes - COPD and Pleural effusion Labs and images were reviewed. Patient Has recent ABG, which has been reviewed. Will treat underlying causes and adjust management of respiratory failure as follows-  steroids, nebulizers.  Follow Pulmonary recommendations.  Patient to undergo right-sided thoracentesis.  Hold evening and morning dose of Lovenox.  Bipolar affective disorder  Stable   -continue home olanzapine, clonidine, and fluoxetine  Seizures  Stable  -continue home oxcarbazepine  Polysubstance abuse  Stable   -continue home Suboxone  Cirrhosis  Patient with known Cirrhosis with Child's class B. Co-morbidities  are present and inclusive of portal hypertension, esophageal varices, portal venous thrombosis, malnutrition, and anemia/thrombocytopenia.  MELD-Na score calculated; MELD 3.0: 12 at 7/28/2025  4:47 AM  MELD-Na: 13 at 7/28/2025  4:47 AM  Calculated from:  Serum Creatinine: 0.5 mg/dL (Using min of 1 mg/dL) at 7/28/2025  4:47 AM  Serum Sodium: 136 mmol/L at 7/28/2025  4:47 AM  Total Bilirubin: 1.8 mg/dL at 7/28/2025  4:47 AM  Serum Albumin: 3.2 g/dL at 7/28/2025  4:47 AM  INR(ratio): 1.3 at 7/26/2025  2:37 PM  Age at listing (hypothetical): 50 years  Sex: Male at 7/28/2025  4:47 AM    Continue chronic meds. Etiology likely Hepatitis C which was cured. Will avoid any hepatotoxic meds, and monitor CBC/CMP/INR for synthetic function.  Continue home lasix. Follows with hepatology and has had recent EGD  Bilateral pleural effusion  Increasing Large on right.  Small on left.  -follow Pulmonary recommendations.  Patient to undergo right-sided ultrasound-guided thoracentesis.  Hold evening and morning dose of Lovenox.  Portal vein thrombosis  Splenic vein thrombosis  Superior mesenteric vein thrombosis  Seen on CT imaging.  Generally asymptomatic  -vascular surgery consult, recommended anticoagulation  -start therapeutic Lovenox now.  Likely DOAC on discharge, being held for the need for thoracentesis.  -monitor platelets  Seen on CT imaging.  Generally asymptomatic  -vascular surgery consult, recommended anticoagulation  -start therapeutic Lovenox now.  Likely DOAC on discharge  -monitor platelets  Seen on CT imaging.  Generally asymptomatic  -vascular surgery consult, recommended anticoagulation  -start therapeutic Lovenox now.  Likely DOAC on discharge  -monitor platelets  COPD exacerbation  Patient's COPD is with exacerbation noted by worsening of baseline hypoxia and wheezing currently.  Patient is currently off COPD Pathway. Continue scheduled inhalers, Steroids and Supplemental oxygen and monitor respiratory status  closely.   Thrombocytopenia  The likely etiology of thrombocytopenia is liver disease. The patients 3 most recent labs are listed below.  Recent Labs     07/26/25  1437 07/27/25  0612   * 101*     Plan  - Will transfuse if platelet count is <10k or if less than 50 K with active bleeding or planned procedure.  - CBC daily  - monitor close while on therapeutic Lovenox  Hypokalemia  Patient's most recent potassium results are listed below.   Recent Labs     07/26/25  1437 07/27/25  0612 07/28/25  0447   K 3.4* 3.3* 3.0*     Plan  - Replete potassium per protocol  - Monitor potassium Daily  - Patient's hypokalemia is stable    Discussed with Dr. Gilmore, bedside nurse and .  Follow 2D echocardiogram.  VTE Risk Mitigation (From admission, onward)           Ordered     enoxaparin injection 80 mg  Every 12 hours         07/27/25 1132     IP VTE HIGH RISK PATIENT  Once         07/26/25 1721     Place sequential compression device  Until discontinued         07/26/25 1721                    Discharge Planning   SHAKIRA: 7/29/2025     Code Status: Full Code   Medical Readiness for Discharge Date:   Discharge Plan A: Home with family                        Oly Santos MD  Department of Hospital Medicine   [facility]

## 2025-07-29 PROBLEM — F17.200 TOBACCO DEPENDENCY: Status: ACTIVE | Noted: 2025-07-29

## 2025-07-29 LAB
ALBUMIN SERPL-MCNC: 3.1 G/DL (ref 3.5–5.2)
ALP SERPL-CCNC: 70 UNIT/L (ref 55–135)
ALT SERPL-CCNC: 11 UNIT/L (ref 10–44)
ANION GAP (SMH): 8 MMOL/L (ref 8–16)
APPEARANCE FLD: NORMAL
AST SERPL-CCNC: 36 UNIT/L (ref 10–40)
BASOPHILS NFR FLD MANUAL: 1 %
BILIRUB SERPL-MCNC: 1.4 MG/DL (ref 0.1–1)
BUN SERPL-MCNC: 7 MG/DL (ref 6–20)
CALCIUM SERPL-MCNC: 8.5 MG/DL (ref 8.7–10.5)
CHLORIDE SERPL-SCNC: 101 MMOL/L (ref 95–110)
CO2 SERPL-SCNC: 28 MMOL/L (ref 23–29)
COLOR FLD: NORMAL
CREAT SERPL-MCNC: 0.6 MG/DL (ref 0.5–1.4)
EOSINOPHIL NFR FLD MANUAL: 51 %
ERYTHROCYTE [DISTWIDTH] IN BLOOD BY AUTOMATED COUNT: 13.9 % (ref 11.5–14.5)
GFR SERPLBLD CREATININE-BSD FMLA CKD-EPI: >60 ML/MIN/1.73/M2
GLUCOSE FLD-MCNC: 110 MG/DL
GLUCOSE SERPL-MCNC: 119 MG/DL (ref 70–110)
HCT VFR BLD AUTO: 39.1 % (ref 40–54)
HGB BLD-MCNC: 13.5 GM/DL (ref 14–18)
LDH FLD L TO P-CCNC: 1034 U/L
LYMPHOCYTES NFR FLD MANUAL: 16 %
MAGNESIUM SERPL-MCNC: 1.7 MG/DL (ref 1.6–2.6)
MCH RBC QN AUTO: 31.5 PG (ref 27–31)
MCHC RBC AUTO-ENTMCNC: 34.5 G/DL (ref 32–36)
MCV RBC AUTO: 91 FL (ref 82–98)
MONOS+MACROS NFR FLD MANUAL: 27 %
NEUTROPHILS NFR FLD MANUAL: 3 %
OTHER CELLS FLD MANUAL: 2 %
PHOSPHATE SERPL-MCNC: 3.4 MG/DL (ref 2.7–4.5)
PLATELET # BLD AUTO: 107 K/UL (ref 150–450)
PMV BLD AUTO: 11 FL (ref 9.2–12.9)
POTASSIUM SERPL-SCNC: 3.3 MMOL/L (ref 3.5–5.1)
PROT FLD-MCNC: 4.2 G/DL
PROT SERPL-MCNC: 6.4 GM/DL (ref 6–8.4)
RBC # BLD AUTO: 4.28 M/UL (ref 4.6–6.2)
SODIUM SERPL-SCNC: 137 MMOL/L (ref 136–145)
WBC # BLD AUTO: 7.82 K/UL (ref 3.9–12.7)
WBC # FLD: 3432 /CU MM

## 2025-07-29 PROCEDURE — 63600175 PHARM REV CODE 636 W HCPCS: Performed by: STUDENT IN AN ORGANIZED HEALTH CARE EDUCATION/TRAINING PROGRAM

## 2025-07-29 PROCEDURE — 25000003 PHARM REV CODE 250: Performed by: INTERNAL MEDICINE

## 2025-07-29 PROCEDURE — 63600175 PHARM REV CODE 636 W HCPCS

## 2025-07-29 PROCEDURE — 84100 ASSAY OF PHOSPHORUS: CPT | Performed by: STUDENT IN AN ORGANIZED HEALTH CARE EDUCATION/TRAINING PROGRAM

## 2025-07-29 PROCEDURE — 11000001 HC ACUTE MED/SURG PRIVATE ROOM

## 2025-07-29 PROCEDURE — 87205 SMEAR GRAM STAIN: CPT | Performed by: INTERNAL MEDICINE

## 2025-07-29 PROCEDURE — 94640 AIRWAY INHALATION TREATMENT: CPT

## 2025-07-29 PROCEDURE — 85027 COMPLETE CBC AUTOMATED: CPT | Performed by: STUDENT IN AN ORGANIZED HEALTH CARE EDUCATION/TRAINING PROGRAM

## 2025-07-29 PROCEDURE — 84157 ASSAY OF PROTEIN OTHER: CPT | Performed by: INTERNAL MEDICINE

## 2025-07-29 PROCEDURE — 25000003 PHARM REV CODE 250

## 2025-07-29 PROCEDURE — 25000242 PHARM REV CODE 250 ALT 637 W/ HCPCS

## 2025-07-29 PROCEDURE — 63600175 PHARM REV CODE 636 W HCPCS: Performed by: RADIOLOGY

## 2025-07-29 PROCEDURE — 36415 COLL VENOUS BLD VENIPUNCTURE: CPT

## 2025-07-29 PROCEDURE — 83735 ASSAY OF MAGNESIUM: CPT

## 2025-07-29 PROCEDURE — 99900031 HC PATIENT EDUCATION (STAT)

## 2025-07-29 PROCEDURE — 99900035 HC TECH TIME PER 15 MIN (STAT)

## 2025-07-29 PROCEDURE — 89051 BODY FLUID CELL COUNT: CPT | Performed by: INTERNAL MEDICINE

## 2025-07-29 PROCEDURE — 82945 GLUCOSE OTHER FLUID: CPT | Performed by: INTERNAL MEDICINE

## 2025-07-29 PROCEDURE — 80053 COMPREHEN METABOLIC PANEL: CPT

## 2025-07-29 PROCEDURE — 27000221 HC OXYGEN, UP TO 24 HOURS

## 2025-07-29 PROCEDURE — 83615 LACTATE (LD) (LDH) ENZYME: CPT | Performed by: INTERNAL MEDICINE

## 2025-07-29 PROCEDURE — S4991 NICOTINE PATCH NONLEGEND: HCPCS

## 2025-07-29 PROCEDURE — 0W993ZZ DRAINAGE OF RIGHT PLEURAL CAVITY, PERCUTANEOUS APPROACH: ICD-10-PCS | Performed by: RADIOLOGY

## 2025-07-29 PROCEDURE — 94761 N-INVAS EAR/PLS OXIMETRY MLT: CPT

## 2025-07-29 RX ORDER — LIDOCAINE HYDROCHLORIDE 10 MG/ML
INJECTION, SOLUTION EPIDURAL; INFILTRATION; INTRACAUDAL; PERINEURAL
Status: COMPLETED | OUTPATIENT
Start: 2025-07-29 | End: 2025-07-29

## 2025-07-29 RX ORDER — POTASSIUM CHLORIDE 20 MEQ/1
40 TABLET, EXTENDED RELEASE ORAL ONCE
Status: COMPLETED | OUTPATIENT
Start: 2025-07-29 | End: 2025-07-29

## 2025-07-29 RX ADMIN — OXCARBAZEPINE 300 MG: 150 TABLET, FILM COATED ORAL at 08:07

## 2025-07-29 RX ADMIN — PREDNISONE 40 MG: 20 TABLET ORAL at 08:07

## 2025-07-29 RX ADMIN — NICOTINE 1 PATCH: 21 PATCH, EXTENDED RELEASE TRANSDERMAL at 08:07

## 2025-07-29 RX ADMIN — LIDOCAINE HYDROCHLORIDE 10 ML: 10 INJECTION, SOLUTION EPIDURAL; INFILTRATION; INTRACAUDAL; PERINEURAL at 09:07

## 2025-07-29 RX ADMIN — PANTOPRAZOLE SODIUM 40 MG: 40 TABLET, DELAYED RELEASE ORAL at 05:07

## 2025-07-29 RX ADMIN — OXCARBAZEPINE 300 MG: 150 TABLET, FILM COATED ORAL at 09:07

## 2025-07-29 RX ADMIN — BUPRENORPHINE AND NALOXONE 1 FILM: 8; 2 FILM BUCCAL; SUBLINGUAL at 08:07

## 2025-07-29 RX ADMIN — BUPRENORPHINE AND NALOXONE 1 FILM: 8; 2 FILM BUCCAL; SUBLINGUAL at 09:07

## 2025-07-29 RX ADMIN — IPRATROPIUM BROMIDE AND ALBUTEROL SULFATE 3 ML: 2.5; .5 SOLUTION RESPIRATORY (INHALATION) at 02:07

## 2025-07-29 RX ADMIN — FUROSEMIDE 40 MG: 10 INJECTION, SOLUTION INTRAMUSCULAR; INTRAVENOUS at 05:07

## 2025-07-29 RX ADMIN — CLONIDINE HYDROCHLORIDE 0.1 MG: 0.1 TABLET ORAL at 09:07

## 2025-07-29 RX ADMIN — ALUMINUM HYDROXIDE, MAGNESIUM HYDROXIDE, AND DIMETHICONE 30 ML: 200; 20; 200 SUSPENSION ORAL at 08:07

## 2025-07-29 RX ADMIN — OLANZAPINE 20 MG: 5 TABLET, FILM COATED ORAL at 09:07

## 2025-07-29 RX ADMIN — ENOXAPARIN SODIUM 80 MG: 80 INJECTION SUBCUTANEOUS at 05:07

## 2025-07-29 RX ADMIN — IPRATROPIUM BROMIDE AND ALBUTEROL SULFATE 3 ML: 2.5; .5 SOLUTION RESPIRATORY (INHALATION) at 07:07

## 2025-07-29 RX ADMIN — POTASSIUM CHLORIDE 40 MEQ: 1500 TABLET, EXTENDED RELEASE ORAL at 05:07

## 2025-07-29 RX ADMIN — FUROSEMIDE 40 MG: 10 INJECTION, SOLUTION INTRAMUSCULAR; INTRAVENOUS at 08:07

## 2025-07-29 RX ADMIN — FLUOXETINE HYDROCHLORIDE 20 MG: 20 CAPSULE ORAL at 08:07

## 2025-07-29 NOTE — PROGRESS NOTES
[facility] Medicine  Progress Note    Patient Name: Christiano Gomez Jr.  MRN: [id number]  Patient Class: IP- Inpatient   Admission Date: 7/26/2025  Length of Stay: 1 days  Attending Physician: Oly Santos MD  Primary Care Provider: Viki, Provider        Subjective     Principal Problem:Acute respiratory failure        HPI:  This is a pleasant 50-year-old gentleman with comorbid conditions of cirrhosis, mood disorder who presents to the emergency department with complaints of fatigue, wheezing and dyspnea.  Patient found to be markedly wheezy on admission as well as hypoxic requiring 2 L nasal cannula.  Imaging showing bilateral pleural effusions and infiltrate.  He is initiated on therapy for community-acquired pneumonia and COPD exacerbation with antibiotics, steroids and nebulizers.  Admission to Hospital Medicine.    Overview/Hospital Course:  50-year-old male with PMH per HPI is admitted due to acute hypoxic respiratory failure attributed to COPD exacerbation and a large right pleural effusion.  Also has a small left pleural effusion.  He required nasal cannula oxygen support.  Also found to have thrombosis in the portal vein, splenic vein, and superior mesenteric vein.  Initially given antibiotics for concern for pneumonia but this was ruled out and antibiotics stopped with CT imaging showing more likely atelectasis or scarring.  Treated with nebulized breathing treatments and corticosteroids.  Started on therapeutic Lovenox.  Consults to pulmonology and vascular surgery.  Vascular surgery recommended anticoagulation for his thromboses without other intervention.  Subsequent chest x-ray showed worsening a right large pleural effusion.  Interventional Radiology consulted for right-sided thoracentesis.  Anticoagulation therapy held.    Interval History:  Patient is seen and examined during multidisciplinary rounds.  Patient is currently on 3 L/min supplemental oxygen via nasal cannula.   Patient just returned from radiology department where ultrasound-guided right-sided thoracentesis with 1700 cc blood-tinged pleural fluid removed.  Patient reports improved breathing.  Family member present at bedside.  2D echocardiogram results reviewed with the patient.  Currently afebrile.  No other subjective complaints.    Objective:     Vital Signs (Most Recent):  Temp: 98.1 °F (36.7 °C) (07/29/25 0323)  Pulse: 64 (07/29/25 0718)  Resp: 18 (07/29/25 0718)  BP: 98/64 (07/29/25 0323)  SpO2: (!) 94 % (07/29/25 0718) Vital Signs (24h Range):  Temp:  [97.4 °F (36.3 °C)-98.2 °F (36.8 °C)] 98.1 °F (36.7 °C)  Pulse:  [56-84] 64  Resp:  [16-20] 18  SpO2:  [93 %-98 %] 94 %  BP: ()/(61-76) 98/64     Weight: 81.1 kg (178 lb 12.7 oz)  Body mass index is 27.19 kg/m².    Intake/Output Summary (Last 24 hours) at 7/29/2025 0724  Last data filed at 7/29/2025 0509  Gross per 24 hour   Intake 840 ml   Output 650 ml   Net 190 ml         Physical Exam  Vitals reviewed.   Constitutional:       General: He is not in acute distress.  HENT:      Head: Normocephalic and atraumatic.   Cardiovascular:      Rate and Rhythm: Normal rate and regular rhythm.   Pulmonary:      Effort: Pulmonary effort is normal. No respiratory distress.      Breath sounds: No wheezing, rhonchi or rales.      Comments: Decreased breath sounds right mid to lower lung field and at left base  Skin:     General: Skin is warm and dry.      Comments: Clubbed fingers  Hyperpigmented skin changes distal BLE   Neurological:      General: No focal deficit present.      Mental Status: He is alert and oriented to person, place, and time. Mental status is at baseline.   Psychiatric:         Mood and Affect: Affect normal.         Behavior: Behavior normal.               Significant Labs:   Lab Results   Component Value Date    WBC 7.82 07/29/2025    HGB 13.5 (L) 07/29/2025    HCT 39.1 (L) 07/29/2025    MCV 91 07/29/2025     (L) 07/29/2025       CMP  Sodium    Date Value Ref Range Status   07/29/2025 137 136 - 145 mmol/L Final     Potassium   Date Value Ref Range Status   07/29/2025 3.3 (L) 3.5 - 5.1 mmol/L Final     Chloride   Date Value Ref Range Status   07/29/2025 101 95 - 110 mmol/L Final     CO2   Date Value Ref Range Status   07/29/2025 28 23 - 29 mmol/L Final     Glucose   Date Value Ref Range Status   07/29/2025 119 (H) 70 - 110 mg/dL Final     BUN   Date Value Ref Range Status   07/29/2025 7 6 - 20 mg/dL Final     Creatinine   Date Value Ref Range Status   07/29/2025 0.6 0.5 - 1.4 mg/dL Final     Calcium   Date Value Ref Range Status   07/29/2025 8.5 (L) 8.7 - 10.5 mg/dL Final     Protein Total   Date Value Ref Range Status   07/29/2025 6.4 6.0 - 8.4 gm/dL Final     Albumin   Date Value Ref Range Status   07/29/2025 3.1 (L) 3.5 - 5.2 g/dL Final     Bilirubin Total   Date Value Ref Range Status   07/29/2025 1.4 (H) 0.1 - 1.0 mg/dL Final     Comment:     For infants and newborns, interpretation of results should be based   on gestational age, weight and in agreement with clinical   observations.    Premature Infant recommended reference ranges:   0-24 hours:  <8.0 mg/dL   24-48 hours: <12.0 mg/dL   3-5 days:    <15.0 mg/dL   6-29 days:   <15.0 mg/dL     ALP   Date Value Ref Range Status   07/29/2025 70 55 - 135 unit/L Final     AST   Date Value Ref Range Status   07/29/2025 36 10 - 40 unit/L Final     ALT   Date Value Ref Range Status   07/29/2025 11 10 - 44 unit/L Final     Anion Gap   Date Value Ref Range Status   07/29/2025 8 8 - 16 mmol/L Final     eGFR   Date Value Ref Range Status   07/29/2025 >60 >60 mL/min/1.73/m2 Final   09/24/2024 >60 >60 mL/min/1.73 m^2 Final     Microbiology Results (last 7 days)       Procedure Component Value Units Date/Time    Blood culture x two cultures. Draw prior to antibiotics. [[id number]]  (Normal) Collected: 07/26/25 0249    Order Status: Completed Specimen: Blood from Peripheral, Forearm, Right Updated: 07/28/25 9973      CULTURE, BLOOD ([facility]) No Growth After 48 Hours    Blood culture x two cultures. Draw prior to antibiotics. [[id number]]  (Normal) Collected: 07/26/25 1502    Order Status: Completed Specimen: Blood from Peripheral, Hand, Left Updated: 07/28/25 1601     CULTURE, BLOOD ([facility]) No Growth After 48 Hours    Culture, Body Fluid (Aerobic) w/ GS [[id number]]     Order Status: Sent Specimen: Body Fluid from Pleural Fluid     Influenza A & B by Molecular [[id number]]  (Normal) Collected: 07/26/25 1444    Order Status: Completed Specimen: Nasal Swab Updated: 07/26/25 1534     INFLUENZA A MOLECULAR Negative     INFLUENZA B MOLECULAR  Negative    Group A Strep, Molecular [[id number]]  (Normal) Collected: 07/26/25 1444    Order Status: Completed Specimen: Throat Updated: 07/26/25 1516     Group A Strep Molecular Negative          Significant Imaging:  CXR:  Small bilateral pleural effusions and adjacent atelectasis/consolidation, new/worse from the previous exam.     CTA Chest:  1. No evidence of pulmonary embolism.  2. Large right and small left pleural effusions with areas of linear atelectasis versus scarring involving the lungs bilaterally.    CT abdomen and pelvis with IV contrast:  1. Cirrhosis and portal hypertension with splenomegaly and upper abdominal and paraesophageal varices.  2. Partially occlusive portal vein thrombus is present.  Occlusive splenic vein thrombus and partially occlusive thrombus is present in the superior mesenteric vein.    US abdomen:  Cirrhosis, splenomegaly, and bilateral pleural effusions.     CXR:  Slight interval worsening right greater than left layering pleural effusions and adjacent atelectasis/consolidation.     ECHO:    Left Ventricle: The left ventricle is normal in size. Normal wall thickness. There is normal systolic function with a visually estimated ejection fraction of 55 - 60%. There is normal diastolic function.    Right Ventricle: The right ventricle is normal in size. Systolic  function is normal.    Left Atrium: The left atrium is mildly dilated.    Mitral Valve: There is mild regurgitation.    Tricuspid Valve: There is mild regurgitation. The estimated PA systolic pressure is at least 20 mmHg.  .    IVC/SVC: Normal venous pressure at 3 mmHg.    Pericardium: There is a small effusion adjacent to the right atrium. Pericardial effusion is echolucent. No indication of cardiac tamponade. Left pleural effusion.    US guided right thoracentesis:  After obtaining informed written consent, utilizing ultrasound guidance and local anesthesia (1% lidocaine) routine thoracentesis was performed via a right posterolateral approach utilizing a 6 [place] pigtail type catheter. This yielded 1750 cc of blood-tinged fluid. Following removal of the catheter, a bandage was placed at the entry site. The patient tolerated the procedure well without immediate postprocedure complication and was subsequently transported to the radiography suite for postprocedure imaging.     CXR:  Stable small left pleural effusion with left lower lobe atelectasis       Assessment & Plan  Acute respiratory failure  Patient with Hypoxic Respiratory failure which is Acute.  he is not on home oxygen. Supplemental oxygen was provided and noted- Oxygen Concentration (%):  [32] 32    Signs/symptoms of respiratory failure include- wheezing and hypoxia. Contributing diagnoses includes - COPD and Pleural effusion Labs and images were reviewed. Patient Has recent ABG, which has been reviewed. Will treat underlying causes and adjust management of respiratory failure as follows-  steroids, nebulizers.  Follow Pulmonary recommendations.    -patient underwent right-sided ultrasound-guided thoracentesis with 1700 cc blood-tinged pleural fluid removed on July 29, 2025.  Follow pleural fluid results.  Bipolar affective disorder  Stable   -continue home olanzapine, clonidine, and fluoxetine  Seizures  Stable  -continue home  oxcarbazepine  Polysubstance abuse  Stable   -continue home Suboxone  Cirrhosis  Patient with known Cirrhosis with Child's class B. Co-morbidities are present and inclusive of portal hypertension, esophageal varices, portal venous thrombosis, malnutrition, and anemia/thrombocytopenia.  MELD-Na score calculated; MELD 3.0: 12 at 7/28/2025  4:47 AM  MELD-Na: 13 at 7/28/2025  4:47 AM  Calculated from:  Serum Creatinine: 0.5 mg/dL (Using min of 1 mg/dL) at 7/28/2025  4:47 AM  Serum Sodium: 136 mmol/L at 7/28/2025  4:47 AM  Total Bilirubin: 1.8 mg/dL at 7/28/2025  4:47 AM  Serum Albumin: 3.2 g/dL at 7/28/2025  4:47 AM  INR(ratio): 1.3 at 7/26/2025  2:37 PM  Age at listing (hypothetical): 50 years  Sex: Male at 7/28/2025  4:47 AM    Continue chronic meds. Etiology likely Hepatitis C which was cured. Will avoid any hepatotoxic meds, and monitor CBC/CMP/INR for synthetic function.  Continue home lasix. Follows with hepatology and has had recent EGD  Bilateral pleural effusion  Increasing Large on right.  Small on left.  -follow Pulmonary recommendations.    -patient underwent right-sided ultrasound-guided thoracentesis is 1700 cc pleural fluid removed.  Follow pleural fluid results.  Portal vein thrombosis  Splenic vein thrombosis  Superior mesenteric vein thrombosis  Seen on CT imaging.  Generally asymptomatic  -vascular surgery consult, recommended anticoagulation  -start therapeutic Lovenox now.  Likely DOAC on discharge, being held for the need for thoracentesis.  -monitor platelets  Seen on CT imaging.  Generally asymptomatic  -vascular surgery consult, recommended anticoagulation  -start therapeutic Lovenox now.  Likely DOAC on discharge  -monitor platelets  Seen on CT imaging.  Generally asymptomatic  -vascular surgery consult, recommended anticoagulation  -start therapeutic Lovenox now.  Likely DOAC on discharge  -monitor platelets  COPD exacerbation  Patient's COPD is with exacerbation noted by worsening of baseline  hypoxia and wheezing currently.  Patient is currently off COPD Pathway. Continue scheduled inhalers, Steroids and Supplemental oxygen and monitor respiratory status closely.   Thrombocytopenia  The likely etiology of thrombocytopenia is liver disease. The patients 3 most recent labs are listed below.  Recent Labs     07/27/25  0612 07/28/25 0447 07/29/25 0528   * 109* 107*     Plan  - Will transfuse if platelet count is <10k or if less than 50 K with active bleeding or planned procedure.  - CBC daily  - monitor close while on therapeutic Lovenox  Hypokalemia  Patient's most recent potassium results are listed below.   Recent Labs     07/27/25  0612 07/28/25 0447 07/29/25  0528   K 3.3* 3.0* 3.3*     Plan  - Replete potassium per protocol  - Monitor potassium Daily  - Patient's hypokalemia is stable    Discussed with bedside nurse and .  Anticoagulation therapy to be resumed.    VTE Risk Mitigation (From admission, onward)           Ordered     enoxaparin injection 80 mg  Every 12 hours         07/27/25 1132     IP VTE HIGH RISK PATIENT  Once         07/26/25 1721     Place sequential compression device  Until discontinued         07/26/25 1721                    Discharge Planning   SHAKIRA: 7/30/2025     Code Status: Full Code   Medical Readiness for Discharge Date:   Discharge Plan A: Home with family                        Oly Santos MD  Department of Hospital Medicine   Formerly [facility], later [facility]

## 2025-07-29 NOTE — ASSESSMENT & PLAN NOTE
Patient's most recent potassium results are listed below.   Recent Labs     07/27/25  0612 07/28/25  0447 07/29/25  0528   K 3.3* 3.0* 3.3*     Plan  - Replete potassium per protocol  - Monitor potassium Daily  - Patient's hypokalemia is stable    Discussed with bedside nurse and .  Anticoagulation therapy to be resumed.

## 2025-07-29 NOTE — ASSESSMENT & PLAN NOTE
Increasing Large on right.  Small on left.  -follow Pulmonary recommendations.    -patient underwent right-sided ultrasound-guided thoracentesis is 1700 cc pleural fluid removed.  Follow pleural fluid results.

## 2025-07-29 NOTE — PLAN OF CARE
Problem: COPD (Chronic Obstructive Pulmonary Disease)  Goal: Optimal Chronic Illness Coping  Outcome: Progressing  Goal: Optimal Level of Functional [place]  Outcome: Progressing     Problem: Adult Inpatient Plan of Care  Goal: Plan of Care Review  Outcome: Progressing  Goal: Optimal Comfort and Wellbeing  Outcome: Progressing     Problem: Pneumonia  Goal: Resolution of Infection Signs and Symptoms  Outcome: Progressing  Goal: Effective Oxygenation and Ventilation  Outcome: Progressing

## 2025-07-29 NOTE — PLAN OF CARE
Problem: COPD (Chronic Obstructive Pulmonary Disease)  Goal: Optimal Chronic Illness Coping  Outcome: Progressing  Goal: Optimal Level of Functional [place]  Outcome: Progressing  Goal: Absence of Infection Signs and Symptoms  Outcome: Progressing  Goal: Improved Oral Intake  Outcome: Progressing  Goal: Effective Oxygenation and Ventilation  Outcome: Progressing     Problem: Adult Inpatient Plan of Care  Goal: Plan of Care Review  Outcome: Progressing  Goal: Patient-Specific Goal (Individualized)  Outcome: Progressing  Goal: Absence of Hospital-Acquired Illness or Injury  Outcome: Progressing  Goal: Optimal Comfort and Wellbeing  Outcome: Progressing  Goal: Readiness for Transition of Care  Outcome: Progressing     Problem: Sepsis/Septic Shock  Goal: Optimal Coping  Outcome: Progressing  Goal: Absence of Bleeding  Outcome: Progressing  Goal: Blood Glucose Level Within Targeted Range  Outcome: Progressing  Goal: Absence of Infection Signs and Symptoms  Outcome: Progressing  Goal: Optimal Nutrition Intake  Outcome: Progressing     Problem: Pneumonia  Goal: Fluid Balance  Outcome: Progressing  Goal: Resolution of Infection Signs and Symptoms  Outcome: Progressing  Goal: Effective Oxygenation and Ventilation  Outcome: Progressing

## 2025-07-29 NOTE — ASSESSMENT & PLAN NOTE
Patient with Hypoxic Respiratory failure which is Acute.  he is not on home oxygen. Supplemental oxygen was provided and noted- Oxygen Concentration (%):  [32] 32    Signs/symptoms of respiratory failure include- wheezing and hypoxia. Contributing diagnoses includes - COPD and Pleural effusion Labs and images were reviewed. Patient Has recent ABG, which has been reviewed. Will treat underlying causes and adjust management of respiratory failure as follows-  steroids, nebulizers.  Follow Pulmonary recommendations.    -patient underwent right-sided ultrasound-guided thoracentesis with 1700 cc blood-tinged pleural fluid removed on July 29, 2025.  Follow pleural fluid results.

## 2025-07-29 NOTE — PLAN OF CARE
Report called to yodit juarez on 1200 wing, verbalized understanding pt back to room via wheel chair with transport and o2 at 3lnc in use, post procedure cxr completed vs stable no co pain ,no acute distress noted

## 2025-07-29 NOTE — SUBJECTIVE & OBJECTIVE
Interval History:  Patient is seen and examined during multidisciplinary rounds.  Patient is currently on 3 L/min supplemental oxygen via nasal cannula.  Patient just returned from radiology department where ultrasound-guided right-sided thoracentesis with 1700 cc blood-tinged pleural fluid removed.  Patient reports improved breathing.  Family member present at bedside.  2D echocardiogram results reviewed with the patient.  Currently afebrile.  No other subjective complaints.    Objective:     Vital Signs (Most Recent):  Temp: 98.1 °F (36.7 °C) (07/29/25 0323)  Pulse: 64 (07/29/25 0718)  Resp: 18 (07/29/25 0718)  BP: 98/64 (07/29/25 0323)  SpO2: (!) 94 % (07/29/25 0718) Vital Signs (24h Range):  Temp:  [97.4 °F (36.3 °C)-98.2 °F (36.8 °C)] 98.1 °F (36.7 °C)  Pulse:  [56-84] 64  Resp:  [16-20] 18  SpO2:  [93 %-98 %] 94 %  BP: ()/(61-76) 98/64     Weight: 81.1 kg (178 lb 12.7 oz)  Body mass index is 27.19 kg/m².    Intake/Output Summary (Last 24 hours) at 7/29/2025 0724  Last data filed at 7/29/2025 0509  Gross per 24 hour   Intake 840 ml   Output 650 ml   Net 190 ml         Physical Exam  Vitals reviewed.   Constitutional:       General: He is not in acute distress.  HENT:      Head: Normocephalic and atraumatic.   Cardiovascular:      Rate and Rhythm: Normal rate and regular rhythm.   Pulmonary:      Effort: Pulmonary effort is normal. No respiratory distress.      Breath sounds: No wheezing, rhonchi or rales.      Comments: Decreased breath sounds right mid to lower lung field and at left base  Skin:     General: Skin is warm and dry.      Comments: Clubbed fingers  Hyperpigmented skin changes distal BLE   Neurological:      General: No focal deficit present.      Mental Status: He is alert and oriented to person, place, and time. Mental status is at baseline.   Psychiatric:         Mood and Affect: Affect normal.         Behavior: Behavior normal.               Significant Labs:   Lab Results   Component  Value Date    WBC 7.82 07/29/2025    HGB 13.5 (L) 07/29/2025    HCT 39.1 (L) 07/29/2025    MCV 91 07/29/2025     (L) 07/29/2025       CMP  Sodium   Date Value Ref Range Status   07/29/2025 137 136 - 145 mmol/L Final     Potassium   Date Value Ref Range Status   07/29/2025 3.3 (L) 3.5 - 5.1 mmol/L Final     Chloride   Date Value Ref Range Status   07/29/2025 101 95 - 110 mmol/L Final     CO2   Date Value Ref Range Status   07/29/2025 28 23 - 29 mmol/L Final     Glucose   Date Value Ref Range Status   07/29/2025 119 (H) 70 - 110 mg/dL Final     BUN   Date Value Ref Range Status   07/29/2025 7 6 - 20 mg/dL Final     Creatinine   Date Value Ref Range Status   07/29/2025 0.6 0.5 - 1.4 mg/dL Final     Calcium   Date Value Ref Range Status   07/29/2025 8.5 (L) 8.7 - 10.5 mg/dL Final     Protein Total   Date Value Ref Range Status   07/29/2025 6.4 6.0 - 8.4 gm/dL Final     Albumin   Date Value Ref Range Status   07/29/2025 3.1 (L) 3.5 - 5.2 g/dL Final     Bilirubin Total   Date Value Ref Range Status   07/29/2025 1.4 (H) 0.1 - 1.0 mg/dL Final     Comment:     For infants and newborns, interpretation of results should be based   on gestational age, weight and in agreement with clinical   observations.    Premature Infant recommended reference ranges:   0-24 hours:  <8.0 mg/dL   24-48 hours: <12.0 mg/dL   3-5 days:    <15.0 mg/dL   6-29 days:   <15.0 mg/dL     ALP   Date Value Ref Range Status   07/29/2025 70 55 - 135 unit/L Final     AST   Date Value Ref Range Status   07/29/2025 36 10 - 40 unit/L Final     ALT   Date Value Ref Range Status   07/29/2025 11 10 - 44 unit/L Final     Anion Gap   Date Value Ref Range Status   07/29/2025 8 8 - 16 mmol/L Final     eGFR   Date Value Ref Range Status   07/29/2025 >60 >60 mL/min/1.73/m2 Final   09/24/2024 >60 >60 mL/min/1.73 m^2 Final     Microbiology Results (last 7 days)       Procedure Component Value Units Date/Time    Blood culture x two cultures. Draw prior to  antibiotics. [[id number]]  (Normal) Collected: 07/26/25 1453    Order Status: Completed Specimen: Blood from Peripheral, Forearm, Right Updated: 07/28/25 1601     CULTURE, BLOOD ([facility]) No Growth After 48 Hours    Blood culture x two cultures. Draw prior to antibiotics. [[id number]]  (Normal) Collected: 07/26/25 1502    Order Status: Completed Specimen: Blood from Peripheral, Hand, Left Updated: 07/28/25 1601     CULTURE, BLOOD ([facility]) No Growth After 48 Hours    Culture, Body Fluid (Aerobic) w/ GS [[id number]]     Order Status: Sent Specimen: Body Fluid from Pleural Fluid     Influenza A & B by Molecular [[id number]]  (Normal) Collected: 07/26/25 1444    Order Status: Completed Specimen: Nasal Swab Updated: 07/26/25 1534     INFLUENZA A MOLECULAR Negative     INFLUENZA B MOLECULAR  Negative    Group A Strep, Molecular [[id number]]  (Normal) Collected: 07/26/25 1444    Order Status: Completed Specimen: Throat Updated: 07/26/25 1516     Group A Strep Molecular Negative          Significant Imaging:  CXR:  Small bilateral pleural effusions and adjacent atelectasis/consolidation, new/worse from the previous exam.     CTA Chest:  1. No evidence of pulmonary embolism.  2. Large right and small left pleural effusions with areas of linear atelectasis versus scarring involving the lungs bilaterally.    CT abdomen and pelvis with IV contrast:  1. Cirrhosis and portal hypertension with splenomegaly and upper abdominal and paraesophageal varices.  2. Partially occlusive portal vein thrombus is present.  Occlusive splenic vein thrombus and partially occlusive thrombus is present in the superior mesenteric vein.    US abdomen:  Cirrhosis, splenomegaly, and bilateral pleural effusions.     CXR:  Slight interval worsening right greater than left layering pleural effusions and adjacent atelectasis/consolidation.     ECHO:    Left Ventricle: The left ventricle is normal in size. Normal wall thickness. There is normal systolic function  with a visually estimated ejection fraction of 55 - 60%. There is normal diastolic function.    Right Ventricle: The right ventricle is normal in size. Systolic function is normal.    Left Atrium: The left atrium is mildly dilated.    Mitral Valve: There is mild regurgitation.    Tricuspid Valve: There is mild regurgitation. The estimated PA systolic pressure is at least 20 mmHg.  .    IVC/SVC: Normal venous pressure at 3 mmHg.    Pericardium: There is a small effusion adjacent to the right atrium. Pericardial effusion is echolucent. No indication of cardiac tamponade. Left pleural effusion.    US guided right thoracentesis:  After obtaining informed written consent, utilizing ultrasound guidance and local anesthesia (1% lidocaine) routine thoracentesis was performed via a right posterolateral approach utilizing a 6 [place] pigtail type catheter. This yielded 1750 cc of blood-tinged fluid. Following removal of the catheter, a bandage was placed at the entry site. The patient tolerated the procedure well without immediate postprocedure complication and was subsequently transported to the radiography suite for postprocedure imaging.     CXR:  Stable small left pleural effusion with left lower lobe atelectasis

## 2025-07-29 NOTE — ASSESSMENT & PLAN NOTE
The likely etiology of thrombocytopenia is liver disease. The patients 3 most recent labs are listed below.  Recent Labs     07/27/25  0612 07/28/25  0447 07/29/25  0528   * 109* 107*     Plan  - Will transfuse if platelet count is <10k or if less than 50 K with active bleeding or planned procedure.  - CBC daily  - monitor close while on therapeutic Lovenox

## 2025-07-30 LAB
ALBUMIN SERPL-MCNC: 3.2 G/DL (ref 3.5–5.2)
ALP SERPL-CCNC: 70 UNIT/L (ref 55–135)
ALT SERPL-CCNC: 12 UNIT/L (ref 10–44)
ANION GAP (SMH): 7 MMOL/L (ref 8–16)
AST SERPL-CCNC: 30 UNIT/L (ref 10–40)
BILIRUB SERPL-MCNC: 1.6 MG/DL (ref 0.1–1)
BUN SERPL-MCNC: 9 MG/DL (ref 6–20)
CALCIUM SERPL-MCNC: 8.4 MG/DL (ref 8.7–10.5)
CHLORIDE SERPL-SCNC: 99 MMOL/L (ref 95–110)
CO2 SERPL-SCNC: 31 MMOL/L (ref 23–29)
CREAT SERPL-MCNC: 0.6 MG/DL (ref 0.5–1.4)
ERYTHROCYTE [DISTWIDTH] IN BLOOD BY AUTOMATED COUNT: 14.1 % (ref 11.5–14.5)
GFR SERPLBLD CREATININE-BSD FMLA CKD-EPI: >60 ML/MIN/1.73/M2
GLUCOSE SERPL-MCNC: 102 MG/DL (ref 70–110)
HCT VFR BLD AUTO: 40.3 % (ref 40–54)
HCV RNA SERPL NAA+PROBE-ACNC: NORMAL IU/ML
HGB BLD-MCNC: 13.7 GM/DL (ref 14–18)
MAGNESIUM SERPL-MCNC: 1.7 MG/DL (ref 1.6–2.6)
MCH RBC QN AUTO: 31.3 PG (ref 27–31)
MCHC RBC AUTO-ENTMCNC: 34 G/DL (ref 32–36)
MCV RBC AUTO: 92 FL (ref 82–98)
PHOSPHATE SERPL-MCNC: 3.3 MG/DL (ref 2.7–4.5)
PLATELET # BLD AUTO: 121 K/UL (ref 150–450)
PMV BLD AUTO: 10.9 FL (ref 9.2–12.9)
POTASSIUM SERPL-SCNC: 3.1 MMOL/L (ref 3.5–5.1)
PROT SERPL-MCNC: 6.3 GM/DL (ref 6–8.4)
RBC # BLD AUTO: 4.38 M/UL (ref 4.6–6.2)
SODIUM SERPL-SCNC: 137 MMOL/L (ref 136–145)
WBC # BLD AUTO: 8.33 K/UL (ref 3.9–12.7)

## 2025-07-30 PROCEDURE — 99900035 HC TECH TIME PER 15 MIN (STAT)

## 2025-07-30 PROCEDURE — 84075 ASSAY ALKALINE PHOSPHATASE: CPT

## 2025-07-30 PROCEDURE — 83735 ASSAY OF MAGNESIUM: CPT

## 2025-07-30 PROCEDURE — 63600175 PHARM REV CODE 636 W HCPCS: Performed by: STUDENT IN AN ORGANIZED HEALTH CARE EDUCATION/TRAINING PROGRAM

## 2025-07-30 PROCEDURE — 94640 AIRWAY INHALATION TREATMENT: CPT

## 2025-07-30 PROCEDURE — 94761 N-INVAS EAR/PLS OXIMETRY MLT: CPT

## 2025-07-30 PROCEDURE — 36415 COLL VENOUS BLD VENIPUNCTURE: CPT | Performed by: STUDENT IN AN ORGANIZED HEALTH CARE EDUCATION/TRAINING PROGRAM

## 2025-07-30 PROCEDURE — 63600175 PHARM REV CODE 636 W HCPCS

## 2025-07-30 PROCEDURE — 99232 SBSQ HOSP IP/OBS MODERATE 35: CPT | Mod: ,,, | Performed by: INTERNAL MEDICINE

## 2025-07-30 PROCEDURE — 25000003 PHARM REV CODE 250: Performed by: INTERNAL MEDICINE

## 2025-07-30 PROCEDURE — 97165 OT EVAL LOW COMPLEX 30 MIN: CPT

## 2025-07-30 PROCEDURE — 27000221 HC OXYGEN, UP TO 24 HOURS

## 2025-07-30 PROCEDURE — S4991 NICOTINE PATCH NONLEGEND: HCPCS

## 2025-07-30 PROCEDURE — 25000242 PHARM REV CODE 250 ALT 637 W/ HCPCS

## 2025-07-30 PROCEDURE — 25000003 PHARM REV CODE 250

## 2025-07-30 PROCEDURE — 11000001 HC ACUTE MED/SURG PRIVATE ROOM

## 2025-07-30 PROCEDURE — 84100 ASSAY OF PHOSPHORUS: CPT | Performed by: STUDENT IN AN ORGANIZED HEALTH CARE EDUCATION/TRAINING PROGRAM

## 2025-07-30 PROCEDURE — 85027 COMPLETE CBC AUTOMATED: CPT | Performed by: STUDENT IN AN ORGANIZED HEALTH CARE EDUCATION/TRAINING PROGRAM

## 2025-07-30 PROCEDURE — 99900031 HC PATIENT EDUCATION (STAT)

## 2025-07-30 PROCEDURE — 97161 PT EVAL LOW COMPLEX 20 MIN: CPT

## 2025-07-30 RX ORDER — POTASSIUM CHLORIDE 20 MEQ/1
40 TABLET, EXTENDED RELEASE ORAL ONCE
Status: COMPLETED | OUTPATIENT
Start: 2025-07-30 | End: 2025-07-30

## 2025-07-30 RX ADMIN — POTASSIUM CHLORIDE 40 MEQ: 1500 TABLET, EXTENDED RELEASE ORAL at 11:07

## 2025-07-30 RX ADMIN — FUROSEMIDE 40 MG: 10 INJECTION, SOLUTION INTRAMUSCULAR; INTRAVENOUS at 08:07

## 2025-07-30 RX ADMIN — NICOTINE 1 PATCH: 21 PATCH, EXTENDED RELEASE TRANSDERMAL at 08:07

## 2025-07-30 RX ADMIN — IPRATROPIUM BROMIDE AND ALBUTEROL SULFATE 3 ML: 2.5; .5 SOLUTION RESPIRATORY (INHALATION) at 07:07

## 2025-07-30 RX ADMIN — OLANZAPINE 20 MG: 5 TABLET, FILM COATED ORAL at 07:07

## 2025-07-30 RX ADMIN — FUROSEMIDE 40 MG: 10 INJECTION, SOLUTION INTRAMUSCULAR; INTRAVENOUS at 05:07

## 2025-07-30 RX ADMIN — PREDNISONE 40 MG: 20 TABLET ORAL at 08:07

## 2025-07-30 RX ADMIN — PANTOPRAZOLE SODIUM 40 MG: 40 TABLET, DELAYED RELEASE ORAL at 05:07

## 2025-07-30 RX ADMIN — SENNOSIDES, DOCUSATE SODIUM 1 TABLET: 50; 8.6 TABLET, FILM COATED ORAL at 07:07

## 2025-07-30 RX ADMIN — OXCARBAZEPINE 300 MG: 150 TABLET, FILM COATED ORAL at 07:07

## 2025-07-30 RX ADMIN — FLUOXETINE HYDROCHLORIDE 20 MG: 20 CAPSULE ORAL at 08:07

## 2025-07-30 RX ADMIN — CLONIDINE HYDROCHLORIDE 0.1 MG: 0.1 TABLET ORAL at 07:07

## 2025-07-30 RX ADMIN — ENOXAPARIN SODIUM 80 MG: 80 INJECTION SUBCUTANEOUS at 05:07

## 2025-07-30 RX ADMIN — LACTULOSE 20 G: 10 SOLUTION ORAL at 02:07

## 2025-07-30 RX ADMIN — SENNOSIDES, DOCUSATE SODIUM 1 TABLET: 50; 8.6 TABLET, FILM COATED ORAL at 08:07

## 2025-07-30 RX ADMIN — BUPRENORPHINE AND NALOXONE 1 FILM: 8; 2 FILM BUCCAL; SUBLINGUAL at 08:07

## 2025-07-30 RX ADMIN — IPRATROPIUM BROMIDE AND ALBUTEROL SULFATE 3 ML: 2.5; .5 SOLUTION RESPIRATORY (INHALATION) at 01:07

## 2025-07-30 RX ADMIN — ALUMINUM HYDROXIDE, MAGNESIUM HYDROXIDE, AND DIMETHICONE 30 ML: 200; 20; 200 SUSPENSION ORAL at 09:07

## 2025-07-30 RX ADMIN — OXCARBAZEPINE 300 MG: 150 TABLET, FILM COATED ORAL at 08:07

## 2025-07-30 RX ADMIN — ALUMINUM HYDROXIDE, MAGNESIUM HYDROXIDE, AND DIMETHICONE 30 ML: 200; 20; 200 SUSPENSION ORAL at 03:07

## 2025-07-30 RX ADMIN — BUPRENORPHINE AND NALOXONE 1 FILM: 8; 2 FILM BUCCAL; SUBLINGUAL at 07:07

## 2025-07-30 NOTE — PROGRESS NOTES
[facility] Medicine  Progress Note    Patient Name: Christiano Gomez Jr.  MRN: [id number]  Patient Class: IP- Inpatient   Admission Date: 7/26/2025  Length of Stay: 2 days  Attending Physician: Oly Santos MD  Primary Care Provider: Viki Provider        Subjective     Principal Problem:Acute respiratory failure        HPI:  This is a pleasant 50-year-old gentleman with comorbid conditions of cirrhosis, mood disorder who presents to the emergency department with complaints of fatigue, wheezing and dyspnea.  Patient found to be markedly wheezy on admission as well as hypoxic requiring 2 L nasal cannula.  Imaging showing bilateral pleural effusions and infiltrate.  He is initiated on therapy for community-acquired pneumonia and COPD exacerbation with antibiotics, steroids and nebulizers.  Admission to Hospital Medicine.    Overview/Hospital Course:  50-year-old male with PMH per HPI is admitted due to acute hypoxic respiratory failure attributed to COPD exacerbation and a large right pleural effusion.  Also has a small left pleural effusion.  He required nasal cannula oxygen support.  Also found to have thrombosis in the portal vein, splenic vein, and superior mesenteric vein.  Initially given antibiotics for concern for pneumonia but this was ruled out and antibiotics stopped with CT imaging showing more likely atelectasis or scarring.  Treated with nebulized breathing treatments and corticosteroids.  Started on therapeutic Lovenox.  Consults to pulmonology and vascular surgery.  Vascular surgery recommended anticoagulation for his thromboses without other intervention.  Subsequent chest x-ray showed worsening a right large pleural effusion.  Interventional Radiology consulted for right-sided thoracentesis.  Anticoagulation therapy held.  Patient underwent ultrasound-guided right-sided thoracentesis on July 29, 2025 where 1700 cc blood-tinged pleural fluid removed and sent for analysis.   Patient tolerated procedure well.    Interval History:  Patient is seen and examined during multidisciplinary rounds.  Patient is currently on 3 L/min supplemental oxygen via nasal cannula.  Complaining of stomach cramp and possible constipation.  Patient just received laxative.  Patient reports improved breathing.  Awaiting pleural fluid analysis results.  Pulmonary Medicine following.    Objective:     Vital Signs (Most Recent):  Temp: 98.2 °F (36.8 °C) (07/30/25 0345)  Pulse: 70 (07/30/25 0711)  Resp: 18 (07/30/25 0711)  BP: 107/70 (07/30/25 0345)  SpO2: 97 % (07/30/25 0711) Vital Signs (24h Range):  Temp:  [97 °F (36.1 °C)-98.2 °F (36.8 °C)] 98.2 °F (36.8 °C)  Pulse:  [64-83] 70  Resp:  [16-22] 18  SpO2:  [93 %-98 %] 97 %  BP: ()/(48-72) 107/70     Weight: 81.1 kg (178 lb 12.7 oz)  Body mass index is 27.19 kg/m².    Intake/Output Summary (Last 24 hours) at 7/30/2025 0715  Last data filed at 7/30/2025 0412  Gross per 24 hour   Intake 1440 ml   Output 3625 ml   Net -2185 ml         Physical Exam  Vitals reviewed.   Constitutional:       General: He is not in acute distress.  HENT:      Head: Normocephalic and atraumatic.   Cardiovascular:      Rate and Rhythm: Normal rate and regular rhythm.   Pulmonary:      Effort: Pulmonary effort is normal. No respiratory distress.      Breath sounds: No wheezing, rhonchi or rales.      Comments: Decreased breath sounds right mid to lower lung field and at left base  Skin:     General: Skin is warm and dry.      Comments: Clubbed fingers  Hyperpigmented skin changes distal BLE   Neurological:      General: No focal deficit present.      Mental Status: He is alert and oriented to person, place, and time. Mental status is at baseline.   Psychiatric:         Mood and Affect: Affect normal.         Behavior: Behavior normal.               Significant Labs:   Lab Results   Component Value Date    WBC 8.33 07/30/2025    HGB 13.7 (L) 07/30/2025    HCT 40.3 07/30/2025    MCV 92  07/30/2025     (L) 07/30/2025       CMP  Sodium   Date Value Ref Range Status   07/29/2025 137 136 - 145 mmol/L Final     Potassium   Date Value Ref Range Status   07/29/2025 3.3 (L) 3.5 - 5.1 mmol/L Final     Chloride   Date Value Ref Range Status   07/29/2025 101 95 - 110 mmol/L Final     CO2   Date Value Ref Range Status   07/29/2025 28 23 - 29 mmol/L Final     Glucose   Date Value Ref Range Status   07/29/2025 119 (H) 70 - 110 mg/dL Final     BUN   Date Value Ref Range Status   07/29/2025 7 6 - 20 mg/dL Final     Creatinine   Date Value Ref Range Status   07/29/2025 0.6 0.5 - 1.4 mg/dL Final     Calcium   Date Value Ref Range Status   07/29/2025 8.5 (L) 8.7 - 10.5 mg/dL Final     Protein Total   Date Value Ref Range Status   07/29/2025 6.4 6.0 - 8.4 gm/dL Final     Albumin   Date Value Ref Range Status   07/29/2025 3.1 (L) 3.5 - 5.2 g/dL Final     Bilirubin Total   Date Value Ref Range Status   07/29/2025 1.4 (H) 0.1 - 1.0 mg/dL Final     Comment:     For infants and newborns, interpretation of results should be based   on gestational age, weight and in agreement with clinical   observations.    Premature Infant recommended reference ranges:   0-24 hours:  <8.0 mg/dL   24-48 hours: <12.0 mg/dL   3-5 days:    <15.0 mg/dL   6-29 days:   <15.0 mg/dL     ALP   Date Value Ref Range Status   07/29/2025 70 55 - 135 unit/L Final     AST   Date Value Ref Range Status   07/29/2025 36 10 - 40 unit/L Final     ALT   Date Value Ref Range Status   07/29/2025 11 10 - 44 unit/L Final     Anion Gap   Date Value Ref Range Status   07/29/2025 8 8 - 16 mmol/L Final     eGFR   Date Value Ref Range Status   07/29/2025 >60 >60 mL/min/1.73/m2 Final   09/24/2024 >60 >60 mL/min/1.73 m^2 Final     Microbiology Results (last 7 days)       Procedure Component Value Units Date/Time    Blood culture x two cultures. Draw prior to antibiotics. [[id number]]  (Normal) Collected: 07/26/25 1453    Order Status: Completed Specimen: Blood from  Peripheral, Forearm, Right Updated: 07/29/25 1601     CULTURE, BLOOD ([facility]) No Growth After 72 Hours    Blood culture x two cultures. Draw prior to antibiotics. [[id number]]  (Normal) Collected: 07/26/25 1502    Order Status: Completed Specimen: Blood from Peripheral, Hand, Left Updated: 07/29/25 1601     CULTURE, BLOOD ([facility]) No Growth After 72 Hours    Culture, Body Fluid (Aerobic) w/ GS [[id number]] Collected: 07/29/25 0915    Order Status: Completed Specimen: Body Fluid from Pleural Fluid Updated: 07/29/25 1419     GRAM STAIN Rare WBC seen      No organisms seen    Influenza A & B by Molecular [[id number]]  (Normal) Collected: 07/26/25 1444    Order Status: Completed Specimen: Nasal Swab Updated: 07/26/25 1534     INFLUENZA A MOLECULAR Negative     INFLUENZA B MOLECULAR  Negative    Group A Strep, Molecular [[id number]]  (Normal) Collected: 07/26/25 1444    Order Status: Completed Specimen: Throat Updated: 07/26/25 1516     Group A Strep Molecular Negative          Significant Imaging:  CXR:  Small bilateral pleural effusions and adjacent atelectasis/consolidation, new/worse from the previous exam.     CTA Chest:  1. No evidence of pulmonary embolism.  2. Large right and small left pleural effusions with areas of linear atelectasis versus scarring involving the lungs bilaterally.    CT abdomen and pelvis with IV contrast:  1. Cirrhosis and portal hypertension with splenomegaly and upper abdominal and paraesophageal varices.  2. Partially occlusive portal vein thrombus is present.  Occlusive splenic vein thrombus and partially occlusive thrombus is present in the superior mesenteric vein.    US abdomen:  Cirrhosis, splenomegaly, and bilateral pleural effusions.     CXR:  Slight interval worsening right greater than left layering pleural effusions and adjacent atelectasis/consolidation.     ECHO:    Left Ventricle: The left ventricle is normal in size. Normal wall thickness. There is normal systolic function with a  visually estimated ejection fraction of 55 - 60%. There is normal diastolic function.    Right Ventricle: The right ventricle is normal in size. Systolic function is normal.    Left Atrium: The left atrium is mildly dilated.    Mitral Valve: There is mild regurgitation.    Tricuspid Valve: There is mild regurgitation. The estimated PA systolic pressure is at least 20 mmHg.  .    IVC/SVC: Normal venous pressure at 3 mmHg.    Pericardium: There is a small effusion adjacent to the right atrium. Pericardial effusion is echolucent. No indication of cardiac tamponade. Left pleural effusion.    US guided right thoracentesis:  After obtaining informed written consent, utilizing ultrasound guidance and local anesthesia (1% lidocaine) routine thoracentesis was performed via a right posterolateral approach utilizing a 6 [place] pigtail type catheter. This yielded 1750 cc of blood-tinged fluid. Following removal of the catheter, a bandage was placed at the entry site. The patient tolerated the procedure well without immediate postprocedure complication and was subsequently transported to the radiography suite for postprocedure imaging.     CXR:  Stable small left pleural effusion with left lower lobe atelectasis   Review of Systems      Assessment & Plan  Acute respiratory failure  Patient with Hypoxic Respiratory failure which is Acute.  he is not on home oxygen. Supplemental oxygen was provided and noted-      Signs/symptoms of respiratory failure include- wheezing and hypoxia. Contributing diagnoses includes - COPD and Pleural effusion Labs and images were reviewed. Patient Has recent ABG, which has been reviewed. Will treat underlying causes and adjust management of respiratory failure as follows-  steroids, nebulizers.  Follow Pulmonary recommendations.    -patient underwent right-sided ultrasound-guided thoracentesis with 1700 cc blood-tinged pleural fluid removed on July 29, 2025.  Follow pleural fluid results.  Bipolar  affective disorder  Stable   -continue home olanzapine, clonidine, and fluoxetine  Seizures  Stable  -continue home oxcarbazepine  Polysubstance abuse  Stable   -continue home Suboxone  Cirrhosis  Patient with known Cirrhosis with Child's class B. Co-morbidities are present and inclusive of portal hypertension, esophageal varices, portal venous thrombosis, malnutrition, and anemia/thrombocytopenia.  MELD-Na score calculated; MELD 3.0: 12 at 7/28/2025  4:47 AM  MELD-Na: 13 at 7/28/2025  4:47 AM  Calculated from:  Serum Creatinine: 0.5 mg/dL (Using min of 1 mg/dL) at 7/28/2025  4:47 AM  Serum Sodium: 136 mmol/L at 7/28/2025  4:47 AM  Total Bilirubin: 1.8 mg/dL at 7/28/2025  4:47 AM  Serum Albumin: 3.2 g/dL at 7/28/2025  4:47 AM  INR(ratio): 1.3 at 7/26/2025  2:37 PM  Age at listing (hypothetical): 50 years  Sex: Male at 7/28/2025  4:47 AM    Continue chronic meds. Etiology likely Hepatitis C which was cured. Will avoid any hepatotoxic meds, and monitor CBC/CMP/INR for synthetic function.  Continue home lasix. Follows with hepatology and has had recent EGD  Bilateral pleural effusion  Increasing Large on right.  Small on left.  -follow Pulmonary recommendations.    -patient underwent right-sided ultrasound-guided thoracentesis is 1700 cc pleural fluid removed.  Follow pleural fluid results.  Portal vein thrombosis  Splenic vein thrombosis  Superior mesenteric vein thrombosis  Seen on CT imaging.  Generally asymptomatic  -vascular surgery consult, recommended anticoagulation  -start therapeutic Lovenox now.  Likely DOAC on discharge, being held for the need for thoracentesis.  -monitor platelets  Seen on CT imaging.  Generally asymptomatic  -vascular surgery consult, recommended anticoagulation  -start therapeutic Lovenox now.  Likely DOAC on discharge  -monitor platelets  Seen on CT imaging.  Generally asymptomatic  -vascular surgery consult, recommended anticoagulation  -start therapeutic Lovenox now.  Likely DOAC on  discharge  -monitor platelets  COPD exacerbation  Patient's COPD is with exacerbation noted by worsening of baseline hypoxia and wheezing currently.  Patient is currently off COPD Pathway. Continue scheduled inhalers, Steroids and Supplemental oxygen and monitor respiratory status closely.   Thrombocytopenia  The likely etiology of thrombocytopenia is liver disease. The patients 3 most recent labs are listed below.  Recent Labs     07/28/25 0447 07/29/25 0528 07/30/25  0540   * 107* 121*     Plan  - Will transfuse if platelet count is <10k or if less than 50 K with active bleeding or planned procedure.  - CBC daily  - monitor close while on therapeutic Lovenox  Hypokalemia  Patient's most recent potassium results are listed below.   Recent Labs     07/28/25 0447 07/29/25  0528 07/30/25  0540   K 3.0* 3.3* 3.1*     Plan  - Replete potassium per protocol  - Monitor potassium Daily  - Patient's hypokalemia is stable    Discussed with bedside nurse and .  Anticoagulation therapy to be resumed.  Tobacco dependency  Smoking cessation counseling performed. Dangers of cigarette smoking were reviewed with patient in detail and patient was encouraged to quit. Nicotine replacement options were discussed for > 3 minutes.    Discussed with bedside nurse and .  Patient encouraged to get out of bed and increase ambulation.    VTE Risk Mitigation (From admission, onward)           Ordered     enoxaparin injection 80 mg  Every 12 hours         07/27/25 1132     IP VTE HIGH RISK PATIENT  Once         07/26/25 1721     Place sequential compression device  Until discontinued         07/26/25 1721                    Discharge Planning   SHAKIRA: 7/31/2025     Code Status: Full Code   Medical Readiness for Discharge Date:   Discharge Plan A: Home with family                        Oly Santos MD  Department of Hospital Medicine   [facility]

## 2025-07-30 NOTE — SUBJECTIVE & OBJECTIVE
Interval History:  Patient is seen and examined during multidisciplinary rounds.  Patient is currently on 3 L/min supplemental oxygen via nasal cannula.  Complaining of stomach cramp and possible constipation.  Patient just received laxative.  Patient reports improved breathing.  Awaiting pleural fluid analysis results.  Pulmonary Medicine following.    Objective:     Vital Signs (Most Recent):  Temp: 98.2 °F (36.8 °C) (07/30/25 0345)  Pulse: 70 (07/30/25 0711)  Resp: 18 (07/30/25 0711)  BP: 107/70 (07/30/25 0345)  SpO2: 97 % (07/30/25 0711) Vital Signs (24h Range):  Temp:  [97 °F (36.1 °C)-98.2 °F (36.8 °C)] 98.2 °F (36.8 °C)  Pulse:  [64-83] 70  Resp:  [16-22] 18  SpO2:  [93 %-98 %] 97 %  BP: ()/(48-72) 107/70     Weight: 81.1 kg (178 lb 12.7 oz)  Body mass index is 27.19 kg/m².    Intake/Output Summary (Last 24 hours) at 7/30/2025 0715  Last data filed at 7/30/2025 0412  Gross per 24 hour   Intake 1440 ml   Output 3625 ml   Net -2185 ml         Physical Exam  Vitals reviewed.   Constitutional:       General: He is not in acute distress.  HENT:      Head: Normocephalic and atraumatic.   Cardiovascular:      Rate and Rhythm: Normal rate and regular rhythm.   Pulmonary:      Effort: Pulmonary effort is normal. No respiratory distress.      Breath sounds: No wheezing, rhonchi or rales.      Comments: Decreased breath sounds right mid to lower lung field and at left base  Skin:     General: Skin is warm and dry.      Comments: Clubbed fingers  Hyperpigmented skin changes distal BLE   Neurological:      General: No focal deficit present.      Mental Status: He is alert and oriented to person, place, and time. Mental status is at baseline.   Psychiatric:         Mood and Affect: Affect normal.         Behavior: Behavior normal.               Significant Labs:   Lab Results   Component Value Date    WBC 8.33 07/30/2025    HGB 13.7 (L) 07/30/2025    HCT 40.3 07/30/2025    MCV 92 07/30/2025     (L) 07/30/2025        CMP  Sodium   Date Value Ref Range Status   07/29/2025 137 136 - 145 mmol/L Final     Potassium   Date Value Ref Range Status   07/29/2025 3.3 (L) 3.5 - 5.1 mmol/L Final     Chloride   Date Value Ref Range Status   07/29/2025 101 95 - 110 mmol/L Final     CO2   Date Value Ref Range Status   07/29/2025 28 23 - 29 mmol/L Final     Glucose   Date Value Ref Range Status   07/29/2025 119 (H) 70 - 110 mg/dL Final     BUN   Date Value Ref Range Status   07/29/2025 7 6 - 20 mg/dL Final     Creatinine   Date Value Ref Range Status   07/29/2025 0.6 0.5 - 1.4 mg/dL Final     Calcium   Date Value Ref Range Status   07/29/2025 8.5 (L) 8.7 - 10.5 mg/dL Final     Protein Total   Date Value Ref Range Status   07/29/2025 6.4 6.0 - 8.4 gm/dL Final     Albumin   Date Value Ref Range Status   07/29/2025 3.1 (L) 3.5 - 5.2 g/dL Final     Bilirubin Total   Date Value Ref Range Status   07/29/2025 1.4 (H) 0.1 - 1.0 mg/dL Final     Comment:     For infants and newborns, interpretation of results should be based   on gestational age, weight and in agreement with clinical   observations.    Premature Infant recommended reference ranges:   0-24 hours:  <8.0 mg/dL   24-48 hours: <12.0 mg/dL   3-5 days:    <15.0 mg/dL   6-29 days:   <15.0 mg/dL     ALP   Date Value Ref Range Status   07/29/2025 70 55 - 135 unit/L Final     AST   Date Value Ref Range Status   07/29/2025 36 10 - 40 unit/L Final     ALT   Date Value Ref Range Status   07/29/2025 11 10 - 44 unit/L Final     Anion Gap   Date Value Ref Range Status   07/29/2025 8 8 - 16 mmol/L Final     eGFR   Date Value Ref Range Status   07/29/2025 >60 >60 mL/min/1.73/m2 Final   09/24/2024 >60 >60 mL/min/1.73 m^2 Final     Microbiology Results (last 7 days)       Procedure Component Value Units Date/Time    Blood culture x two cultures. Draw prior to antibiotics. [[id number]]  (Normal) Collected: 07/26/25 1453    Order Status: Completed Specimen: Blood from Peripheral, Forearm, Right Updated:  07/29/25 1601     CULTURE, BLOOD ([facility]) No Growth After 72 Hours    Blood culture x two cultures. Draw prior to antibiotics. [[id number]]  (Normal) Collected: 07/26/25 1502    Order Status: Completed Specimen: Blood from Peripheral, Hand, Left Updated: 07/29/25 1601     CULTURE, BLOOD ([facility]) No Growth After 72 Hours    Culture, Body Fluid (Aerobic) w/ GS [[id number]] Collected: 07/29/25 0915    Order Status: Completed Specimen: Body Fluid from Pleural Fluid Updated: 07/29/25 1419     GRAM STAIN Rare WBC seen      No organisms seen    Influenza A & B by Molecular [[id number]]  (Normal) Collected: 07/26/25 1444    Order Status: Completed Specimen: Nasal Swab Updated: 07/26/25 1534     INFLUENZA A MOLECULAR Negative     INFLUENZA B MOLECULAR  Negative    Group A Strep, Molecular [[id number]]  (Normal) Collected: 07/26/25 1444    Order Status: Completed Specimen: Throat Updated: 07/26/25 1516     Group A Strep Molecular Negative          Significant Imaging:  CXR:  Small bilateral pleural effusions and adjacent atelectasis/consolidation, new/worse from the previous exam.     CTA Chest:  1. No evidence of pulmonary embolism.  2. Large right and small left pleural effusions with areas of linear atelectasis versus scarring involving the lungs bilaterally.    CT abdomen and pelvis with IV contrast:  1. Cirrhosis and portal hypertension with splenomegaly and upper abdominal and paraesophageal varices.  2. Partially occlusive portal vein thrombus is present.  Occlusive splenic vein thrombus and partially occlusive thrombus is present in the superior mesenteric vein.    US abdomen:  Cirrhosis, splenomegaly, and bilateral pleural effusions.     CXR:  Slight interval worsening right greater than left layering pleural effusions and adjacent atelectasis/consolidation.     ECHO:    Left Ventricle: The left ventricle is normal in size. Normal wall thickness. There is normal systolic function with a visually estimated ejection fraction  of 55 - 60%. There is normal diastolic function.    Right Ventricle: The right ventricle is normal in size. Systolic function is normal.    Left Atrium: The left atrium is mildly dilated.    Mitral Valve: There is mild regurgitation.    Tricuspid Valve: There is mild regurgitation. The estimated PA systolic pressure is at least 20 mmHg.  .    IVC/SVC: Normal venous pressure at 3 mmHg.    Pericardium: There is a small effusion adjacent to the right atrium. Pericardial effusion is echolucent. No indication of cardiac tamponade. Left pleural effusion.    US guided right thoracentesis:  After obtaining informed written consent, utilizing ultrasound guidance and local anesthesia (1% lidocaine) routine thoracentesis was performed via a right posterolateral approach utilizing a 6 [place] pigtail type catheter. This yielded 1750 cc of blood-tinged fluid. Following removal of the catheter, a bandage was placed at the entry site. The patient tolerated the procedure well without immediate postprocedure complication and was subsequently transported to the radiography suite for postprocedure imaging.     CXR:  Stable small left pleural effusion with left lower lobe atelectasis   Review of Systems

## 2025-07-30 NOTE — ASSESSMENT & PLAN NOTE
Patient's most recent potassium results are listed below.   Recent Labs     07/28/25  0447 07/29/25  0528 07/30/25  0540   K 3.0* 3.3* 3.1*     Plan  - Replete potassium per protocol  - Monitor potassium Daily  - Patient's hypokalemia is stable    Discussed with bedside nurse and .  Anticoagulation therapy to be resumed.

## 2025-07-30 NOTE — ASSESSMENT & PLAN NOTE
The likely etiology of thrombocytopenia is liver disease. The patients 3 most recent labs are listed below.  Recent Labs     07/28/25  0447 07/29/25  0528 07/30/25  0540   * 107* 121*     Plan  - Will transfuse if platelet count is <10k or if less than 50 K with active bleeding or planned procedure.  - CBC daily  - monitor close while on therapeutic Lovenox

## 2025-07-30 NOTE — RESPIRATORY THERAPY
07/29/25 1926   Patient Assessment/Suction   Level of Consciousness (AVPU) alert   Respiratory Effort Normal;Unlabored   Expansion/Accessory Muscles/Retractions no use of accessory muscles;no retractions;expansion symmetric   All Lung Fields Breath Sounds Anterior:;diminished   Rhythm/Pattern, Respiratory unlabored;pattern regular;depth regular;no shortness of breath reported   Skin Integrity   $ Wound Care Tech Time 15 min   Area Observed Left;Right;Cheek;Behind ear;Upper lip;Nares   Skin Appearance without discoloration   PRE-TX-O2   Device (Oxygen Therapy) nasal cannula   Flow (L/min) (Oxygen Therapy) 3   SpO2 97 %   Pulse Oximetry Type Intermittent   $ Pulse Oximetry - Multiple Charge Pulse Oximetry - Multiple   Pulse 68   Resp 16   Aerosol Therapy   $ Aerosol Therapy Charges Aerosol Treatment   Daily Review of Necessity (SVN) completed   Respiratory Treatment Status (SVN) given   Treatment Route (SVN) mask;oxygen   Patient Position HOB elevated   Post Treatment Assessment (SVN) breath sounds unchanged   Signs of Intolerance (SVN) none   Breath Sounds Post-Respiratory Treatment   Throughout All Fields Post-Treatment no change   Post-treatment Heart Rate (beats/min) 68   Post-treatment Resp Rate (breaths/min) 16   Education   $ Education 15 min;Bronchodilator   Respiratory Evaluation   $ Care Plan Tech Time 15 min

## 2025-07-30 NOTE — PT/OT/SLP EVAL
Physical Therapy Evaluation and Discharge Note    Patient Name:  Christiano Gomez Jr.   MRN:  [id number]    Recommendations:     Discharge Recommendations: No Therapy Indicated  Discharge Equipment Recommendations: none   Barriers to discharge: None    Assessment:     Christiano Gomez Jr. is a 50 y.o. male admitted with a medical diagnosis of Acute respiratory failure. Patient is agreeable to participation with PT evaluation. He lives with family and does not use an AD for ambulation at baseline. He performs supine <> sit <> stand with supervision. He ambulated 100' with no AD, SBA, and 2L denying any shortness of breath. He returned to bed with bed alarm on, RN notified, and all needs met. At this time, patient is functioning at their prior level of function and does not require further acute PT services.     Recent Surgery: * No surgery found *      Plan:     During this hospitalization, patient does not require further acute PT services.  Please re-consult if situation changes.      Subjective     Chief Complaint: abdominal cramps   Patient/Family Comments/goals: agrees to walk   Pain/Comfort:  Pain Rating 1: 0/10    Patients cultural, spiritual, Taoism conflicts given the current situation:      Living Environment:  Patient lives with mother and 2 sons in a 2SH with bedroom on 1st floor and 4 LISANDRA with B rails  Prior to admission, patients level of function was no AD for ambulation at baseline. He denies any recent falls or PT. (+) . He does not work.  Equipment used at home: cane, straight, shower chair.  DME owned (not currently used): none.  Upon discharge, patient will have assistance from family.    Objective:     Communicated with CAMERON Sheridan prior to session.  Patient found HOB elevated with bed alarm, oxygen, telemetry upon PT entry to room.    General Precautions: Standard, fall    Orthopedic Precautions:N/A   Braces: N/A  Respiratory Status: Nasal cannula, flow 2 L/min    Exams:  RLE Strength: WFL  LLE  Strength: WFL    Functional Mobility:  Bed Mobility:     Supine to Sit: supervision  Transfers:     Sit to Stand:  supervision with no AD  Gait: 100' with no AD, SBA, and 2L denying any shortness of breath.    AM-PAC 6 CLICK MOBILITY  Total Score:24       Treatment and Education:  Patient was educated on the importance of OOB activity and functional mobility to negate negative effects of prolonged bed rest during hospitalization, safe transfers and ambulation, and D/C planning     AM-PAC 6 CLICK MOBILITY  Total Score:24     Patient left HOB elevated with all lines intact, call button in reach, bed alarm on, and RN notified.    GOALS:   Multidisciplinary Problems       Physical Therapy Goals       Not on file                    DME Justifications:  No DME recommended requiring DME justifications    History:     Past Medical History:   Diagnosis Date    Bipolar disorder     Cirrhosis of liver     Hepatitis C virus infection cured after antiviral drug therapy     treated / cured (SVR 6/2024)    OD (overdose of drug)     Seizures        Past Surgical History:   Procedure Laterality Date    ESOPHAGOGASTRODUODENOSCOPY N/A 6/3/2025    Procedure: EGD (ESOPHAGOGASTRODUODENOSCOPY);  Surgeon: Tony Tinajero MD;  Location: [facility] [place];  Service: Endoscopy;  Laterality: N/A;  ppm       Time Tracking:     PT Received On: 07/30/25  PT Start Time: 1336     PT Stop Time: 1345  PT Total Time (min): 9 min     Billable Minutes: Evaluation 9      07/30/2025

## 2025-07-30 NOTE — ASSESSMENT & PLAN NOTE
Smoking cessation counseling performed. Dangers of cigarette smoking were reviewed with patient in detail and patient was encouraged to quit. Nicotine replacement options were discussed for > 3 minutes.    Discussed with bedside nurse and .  Patient encouraged to get out of bed and increase ambulation.

## 2025-07-30 NOTE — PT/OT/SLP EVAL
Occupational Therapy   Evaluation and Discharge Note    Name: Christiano Gomez Jr.  MRN: [id number]  Admitting Diagnosis: Acute respiratory failure  Recent Surgery: * No surgery found *      Recommendations:     Discharge Recommendations: No Therapy Indicated  Discharge Equipment Recommendations: none  Barriers to discharge:  None    Assessment:     Christiano Gomez Jr. is a 50 y.o. male with a medical diagnosis of Acute respiratory failure. At this time, patient is functioning at their prior level of function and does not require further acute OT services.     Plan:     During this hospitalization, patient does not require further acute OT services.  Please re-consult if situation changes.    Plan of Care Reviewed with: patient    Subjective     Chief Complaint: none stated  Patient/Family Comments/goals: none stated    Occupational Profile:  Living Environment: Pt lives with his parents and sons in a 2 story home with 1st floor setup. Pt has both a WIS and a tub/shower combo  Previous level of function: Independent with ADLs and mobility; Pt and family split household chores  Roles and Routines: father; son  Equipment Used at home: none (Pt states he has a shower chair but doesn't use it)  Assistance upon Discharge: yes, from family    Pain/Comfort:  Pain Rating 1: 0/10    Patients cultural, spiritual, Advent conflicts given the current situation:      Objective:     Communicated with: nursing prior to session.  Patient found HOB elevated with oxygen, bed alarm, telemetry upon OT entry to room.    General Precautions: Standard, fall  Orthopedic Precautions: N/A  Braces: N/A  Respiratory Status: Nasal cannula, flow 2 L/min     Occupational Performance:    Bed Mobility:    Patient completed Supine to Sit with independence  Patient completed Sit to Supine with independence    Functional Mobility/Transfers:  Patient completed Sit <> Stand Transfer with supervision  with  no assistive device   Functional Mobility: pt amb in  room with supervision with no AD, no LOB, no SOB    Activities of Daily Living:  Feeding:  independence per pt  Grooming: supervision standing at sink simulated (as pt did not wish to complete at this time)  Lower Body Dressing: independence to don/doff socks EOB  Toileting: independence per pt    Cognitive/Visual Perceptual:  Cognitive/Psychosocial Skills:     -       Oriented to: Person, Place, Time, and Situation   -       Follows Commands/attention:Follows two-step commands  -       Communication: clear/fluent  -       Memory: No Deficits noted  -       Safety awareness/insight to disability: intact   -       Mood/Affect/Coping skills/emotional control: Appropriate to situation, Cooperative, and Flat affect    Physical Exam:  Balance:    -       Good seated/standing balance  Upper Extremity Range of Motion:     -       Right Upper Extremity: WFL  -       Left Upper Extremity: WFL  Upper Extremity Strength:    -       Right Upper Extremity: WFL  -       Left Upper Extremity: WFL   Strength:    -       Right Upper Extremity: WFL  -       Left Upper Extremity: WFL  Fine Motor Coordination:    -       Intact  Gross motor coordination:   WFL    AMPAC 6 Click ADL:  AMPAC Total Score: 24    Treatment & Education:  Pt educated on role of OT/POC, importance of OOB/EOB activity, use of call bell, and safety during ADLs, transfers, and functional mobility.    Patient left HOB elevated with all lines intact, call button in reach, and bed alarm on    GOALS:   Multidisciplinary Problems       Occupational Therapy Goals       Not on file                    History:     Past Medical History:   Diagnosis Date    Bipolar disorder     Cirrhosis of liver     Hepatitis C virus infection cured after antiviral drug therapy     treated / cured (SVR 6/2024)    OD (overdose of drug)     Seizures          Past Surgical History:   Procedure Laterality Date    ESOPHAGOGASTRODUODENOSCOPY N/A 6/3/2025    Procedure: EGD  (ESOPHAGOGASTRODUODENOSCOPY);  Surgeon: Tony Tinajreo MD;  Location: [facility] [place];  Service: Endoscopy;  Laterality: N/A;  ppm       Time Tracking:     OT Date of Treatment: 07/30/25  OT Start Time: 1139  OT Stop Time: 1147  OT Total Time (min): 8 min    Billable Minutes:Evaluation 8    7/30/2025

## 2025-07-30 NOTE — PLAN OF CARE
Problem: COPD (Chronic Obstructive Pulmonary Disease)  Goal: Optimal Chronic Illness Coping  Outcome: Progressing  Goal: Optimal Level of Functional [place]  Outcome: Progressing  Goal: Absence of Infection Signs and Symptoms  Outcome: Progressing  Goal: Improved Oral Intake  Outcome: Progressing     Problem: Adult Inpatient Plan of Care  Goal: Plan of Care Review  Outcome: Progressing  Goal: Absence of Hospital-Acquired Illness or Injury  Outcome: Progressing     Problem: Sepsis/Septic Shock  Goal: Absence of Infection Signs and Symptoms  Outcome: Progressing     Problem: Pneumonia  Goal: Resolution of Infection Signs and Symptoms  Outcome: Progressing

## 2025-07-30 NOTE — RESPIRATORY THERAPY
07/30/25 0711   Patient Assessment/Suction   Level of Consciousness (AVPU) alert   Respiratory Effort Unlabored   All Lung Fields Breath Sounds diminished   Skin Integrity   $ Wound Care Tech Time 15 min   Area Observed Left;Right;Behind ear;Cheek   Skin Appearance without discoloration   PRE-TX-O2   Device (Oxygen Therapy) nasal cannula   $ Is the patient on Low Flow Oxygen? Yes   SpO2 97 %   Pulse Oximetry Type Intermittent   $ Pulse Oximetry - Multiple Charge Pulse Oximetry - Multiple   Pulse 70   Resp 18   Aerosol Therapy   $ Aerosol Therapy Charges Aerosol Treatment   Respiratory Treatment Status (SVN) given   Treatment Route (SVN) mask   Patient Position HOB elevated   Post Treatment Assessment (SVN) increased aeration   Signs of Intolerance (SVN) none   Breath Sounds Post-Respiratory Treatment   Post-treatment Heart Rate (beats/min) 74   Post-treatment Resp Rate (breaths/min) 16   Education   $ Education Bronchodilator;15 min

## 2025-07-30 NOTE — ASSESSMENT & PLAN NOTE
Patient with Hypoxic Respiratory failure which is Acute.  he is not on home oxygen. Supplemental oxygen was provided and noted-      Signs/symptoms of respiratory failure include- wheezing and hypoxia. Contributing diagnoses includes - COPD and Pleural effusion Labs and images were reviewed. Patient Has recent ABG, which has been reviewed. Will treat underlying causes and adjust management of respiratory failure as follows-  steroids, nebulizers.  Follow Pulmonary recommendations.    -patient underwent right-sided ultrasound-guided thoracentesis with 1700 cc blood-tinged pleural fluid removed on July 29, 2025.  Follow pleural fluid results.

## 2025-07-30 NOTE — PLAN OF CARE
Problem: COPD (Chronic Obstructive Pulmonary Disease)  Goal: Optimal Chronic Illness Coping  Outcome: Progressing  Goal: Optimal Level of Functional [place]  Outcome: Progressing  Goal: Absence of Infection Signs and Symptoms  Outcome: Progressing  Goal: Improved Oral Intake  Outcome: Progressing  Goal: Effective Oxygenation and Ventilation  Outcome: Progressing     Problem: Adult Inpatient Plan of Care  Goal: Plan of Care Review  Outcome: Progressing  Goal: Patient-Specific Goal (Individualized)  Outcome: Progressing  Goal: Absence of Hospital-Acquired Illness or Injury  Outcome: Progressing  Goal: Optimal Comfort and Wellbeing  Outcome: Progressing  Goal: Readiness for Transition of Care  Outcome: Progressing     Problem: Sepsis/Septic Shock  Goal: Optimal Coping  Outcome: Progressing  Goal: Absence of Bleeding  Outcome: Progressing  Goal: Blood Glucose Level Within Targeted Range  Outcome: Progressing  Goal: Absence of Infection Signs and Symptoms  Outcome: Progressing  Goal: Optimal Nutrition Intake  Outcome: Progressing     Problem: Pneumonia  Goal: Fluid Balance  Outcome: Progressing  Goal: Resolution of Infection Signs and Symptoms  Outcome: Progressing  Goal: Effective Oxygenation and Ventilation  Outcome: Progressing

## 2025-07-30 NOTE — PROGRESS NOTES
Pulmonary/Critical Care Progress note      Patient name: Christiano Gomez Jr.  MRN: [id number]  Date: 07/30/2025    Admit Date: 7/26/2025  Consult Requested By: Oly Santos MD    Reason for Consult: pleural effusion    HPI:    Pt is a 49 yo male with cirrhosis due to HCV who presented to the ED due to chest heaviness about 5 d duration. He notes SOB worse w/ exertion getting worse. He feels nebulized albuterol is helpful. Pt takes lasix and spironolactone at home, sees hepatology. He has cough/tan mucous and wheezing off and on. He is a smoker, about 25 yrs. Has never had paracentesis or thoracentesis. With R pleural effusion on imaging and CT abd/p with Partially occlusive portal vein thrombus is present. Occlusive splenic vein thrombus and partially occlusive thrombus is present in the superior mesenteric vein.     7/28- pt on 3L oxygen, still with bilat effusions on cxr. Still feels dizzy when standing up, aching abd pain today  7/30- pt feels stomach cramps today, has taken some laxatives. Had thoracentesis yesterday with drainage 1700 exudative bloody effusion    Review of Systems    Review of Systems   Constitutional: Negative.    HENT: Negative.     Eyes: Negative.    Respiratory:  Positive for cough, sputum production and shortness of breath.    Cardiovascular:  Positive for leg swelling. Negative for chest pain, palpitations, orthopnea and PND.   Gastrointestinal:  Positive for abdominal pain.   Genitourinary: Negative.    Musculoskeletal: Negative.    Skin: Negative.    Neurological: Negative.    Endo/Heme/Allergies: Negative.    Psychiatric/Behavioral: Negative.         Past Medical History    Past Medical History:   Diagnosis Date    Bipolar disorder     Cirrhosis of liver     Hepatitis C virus infection cured after antiviral drug therapy     treated / cured (SVR 6/2024)    OD (overdose of drug)     Seizures        Past Surgical History    Past Surgical History:   Procedure Laterality Date     ESOPHAGOGASTRODUODENOSCOPY N/A 6/3/2025    Procedure: EGD (ESOPHAGOGASTRODUODENOSCOPY);  Surgeon: Tony Tinajero MD;  Location: [facility];  Service: Endoscopy;  Laterality: N/A;  ppm       Medications (scheduled):      albuterol-ipratropium  3 mL Nebulization Q6H WAKE    buprenorphine-naloxone 8-2 mg  1 Film Sublingual BID    cloNIDine  0.1 mg Oral QHS    enoxparin  1 mg/kg Subcutaneous Q12H (treatment, non-standard time)    FLUoxetine  20 mg Oral Daily    furosemide (LASIX) injection  40 mg Intravenous BID loop    nicotine  1 patch Transdermal Daily    OLANZapine  20 mg Oral QHS    OXcarbazepine  300 mg Oral BID    pantoprazole  40 mg Oral Daily    predniSONE  40 mg Oral Daily       Medications (infusions):         Medications (prn):       Current Facility-Administered Medications:     acetaminophen, 650 mg, Oral, Q8H PRN    acetaminophen, 650 mg, Oral, Q4H PRN    aluminum-magnesium hydroxide-simethicone, 30 mL, Oral, QID PRN    clonazePAM, 1 mg, Oral, BID PRN    dextrose 50%, 12.5 g, Intravenous, PRN    dextrose 50%, 25 g, Intravenous, PRN    glucagon (human recombinant), 1 mg, Intramuscular, PRN    glucose, 16 g, Oral, PRN    glucose, 24 g, Oral, PRN    magnesium oxide, 800 mg, Oral, PRN    magnesium oxide, 800 mg, Oral, PRN    melatonin, 6 mg, Oral, Nightly PRN    naloxone, 0.4 mg, Intravenous, PRN    ondansetron, 4 mg, Intravenous, Q6H PRN    potassium bicarbonate, 35 mEq, Oral, PRN    potassium bicarbonate, 50 mEq, Oral, PRN    potassium bicarbonate, 60 mEq, Oral, PRN    potassium, sodium phosphates, 2 packet, Oral, PRN    potassium, sodium phosphates, 2 packet, Oral, PRN    potassium, sodium phosphates, 2 packet, Oral, PRN    senna-docusate, 1 tablet, Oral, Daily PRN    sodium chloride 0.9%, 3 mL, Intravenous, PRN    Family History: No family history on file.    Social History: Tobacco: Tobacco Use History[1]                             EtOH:   Social History     Substance and Sexual Activity   Alcohol  "Use Not Currently                                Drugs:   Social History     Substance and Sexual Activity   Drug Use Not Currently       Physical Exam    Vital signs:  Temp:  [97.7 °F (36.5 °C)-98.2 °F (36.8 °C)]   Pulse:  [68-79]   Resp:  [14-18]   BP: ()/(48-73)   SpO2:  [93 %-98 %]     Intake/Output:   Intake/Output Summary (Last 24 hours) at 7/30/2025 0905  Last data filed at 7/30/2025 0412  Gross per 24 hour   Intake 1440 ml   Output 3625 ml   Net -2185 ml        BMI: Estimated body mass index is 27.19 kg/m² as calculated from the following:    Height as of this encounter: 5' 8" (1.727 m).    Weight as of this encounter: 81.1 kg (178 lb 12.7 oz).    Physical Exam  Constitutional:       General: He is not in acute distress.     Appearance: Normal appearance. He is not ill-appearing.   HENT:      Head: Normocephalic and atraumatic.      Right Ear: External ear normal.      Left Ear: External ear normal.      Nose: Nose normal.      Mouth/Throat:      Mouth: Mucous membranes are moist.      Pharynx: Oropharynx is clear.   Eyes:      Extraocular Movements: Extraocular movements intact.      Pupils: Pupils are equal, round, and reactive to light.   Cardiovascular:      Rate and Rhythm: Normal rate and regular rhythm.      Pulses: Normal pulses.      Heart sounds: Normal heart sounds.   Pulmonary:      Effort: No respiratory distress.      Breath sounds: Normal breath sounds. No wheezing or rales.      Comments: improved  Abdominal:      General: Abdomen is flat. Bowel sounds are normal. There is no distension.      Palpations: Abdomen is soft.      Tenderness: There is no abdominal tenderness.   Musculoskeletal:         General: Normal range of motion.      Cervical back: No rigidity or tenderness.      Right lower leg: No edema.      Left lower leg: No edema.   Lymphadenopathy:      Cervical: No cervical adenopathy.   Skin:     General: Skin is warm and dry.      Coloration: Skin is not jaundiced.      " "Findings: No bruising or erythema.      Comments: Chronic venous stasis changes BLE  Clubbing bilat fingernails   Neurological:      General: No focal deficit present.      Mental Status: He is alert and oriented to person, place, and time.      Cranial Nerves: No cranial nerve deficit.      Motor: No weakness.   Psychiatric:         Mood and Affect: Mood normal.         Behavior: Behavior normal.         Thought Content: Thought content normal.         Judgment: Judgment normal.         Laboratory    Recent Labs   Lab 07/30/25  0540   WBC 8.33   RBC 4.38*   HGB 13.7*   HCT 40.3   *   MCV 92   MCH 31.3*   MCHC 34.0       Recent Labs   Lab 07/30/25  0540   CALCIUM 8.4*   ALBUMIN 3.2*   PROT 6.3      K 3.1*   CO2 31*   CL 99   BUN 9   CREATININE 0.6   ALKPHOS 70   ALT 12   AST 30   BILITOT 1.6*       No results for input(s): "PT", "INR", "APTT" in the last 24 hours.      No results for input(s): "CPK", "CPKMB", "TROPONINI", "MB" in the last 24 hours.      Additional labs:     Microbiology:       Microbiology Results (last 7 days)       Procedure Component Value Units Date/Time    Culture, Body Fluid (Aerobic) w/ GS [[id number]] Collected: 07/29/25 0915    Order Status: Completed Specimen: Body Fluid from Pleural Fluid Updated: 07/30/25 0723     CULTURE, FLUID No Growth     GRAM STAIN Rare WBC seen      No organisms seen    Blood culture x two cultures. Draw prior to antibiotics. [[id number]]  (Normal) Collected: 07/26/25 1453    Order Status: Completed Specimen: Blood from Peripheral, Forearm, Right Updated: 07/29/25 1601     CULTURE, BLOOD ([facility]) No Growth After 72 Hours    Blood culture x two cultures. Draw prior to antibiotics. [[id number]]  (Normal) Collected: 07/26/25 1502    Order Status: Completed Specimen: Blood from Peripheral, Hand, Left Updated: 07/29/25 1601     CULTURE, BLOOD ([facility]) No Growth After 72 Hours    Influenza A & B by Molecular [[id number]]  (Normal) Collected: 07/26/25 1444    Order " Status: Completed Specimen: Nasal Swab Updated: 07/26/25 1534     INFLUENZA A MOLECULAR Negative     INFLUENZA B MOLECULAR  Negative    Group A Strep, Molecular [[id number]]  (Normal) Collected: 07/26/25 1444    Order Status: Completed Specimen: Throat Updated: 07/26/25 1516     Group A Strep Molecular Negative            Radiology    US Thoracentesis with Imaging, Aspiration Only  Narrative: EXAMINATION:  US THORACENTESIS WITH IMAGING, ASPIRATION ONLY    CLINICAL HISTORY:  right pleural effusion;    TECHNIQUE:  After obtaining informed written consent, utilizing ultrasound guidance and local anesthesia (1% lidocaine) routine thoracentesis was performed via a right posterolateral approach utilizing a 6 Chinese pigtail type catheter.  This yielded 1750 cc of blood-tinged fluid.  Following removal of the catheter, a bandage was placed at the entry site.  The patient tolerated the procedure well without immediate postprocedure complication and was subsequently transported to the radiography suite for postprocedure imaging.    COMPARISON:  None    FINDINGS:  Imaging prior to the procedure demonstrates a moderate sized right-sided pleural effusion.  Impression: Technically successful right sided thoracentesis as above without immediate postprocedure complication.    Electronically signed by: Jerome Hyatt  Date:    07/29/2025  Time:    09:44  X-Ray Chest AP Portable  Narrative: EXAMINATION:  XR CHEST AP PORTABLE    CLINICAL HISTORY:  sp right thoracentesis;    FINDINGS:  Portable chest at 09:21 is compared to a prior study dated 07/28/2025 shows no pneumothorax status post right thoracentesis.    Resolution of the right pleural effusion with atelectasis in the right midlung.  There is improved aeration of the right lower lobe.    There is a small residual left pleural effusion with left lower lobe atelectasis.  The upper lobes are clear.  There are no acute osseous abnormalities  Impression: No pneumothorax status post  "right thoracentesis with resolution of the right pleural effusion    Atelectasis in the right mid lung    Stable small left pleural effusion with left lower lobe atelectasis    Electronically signed by: Rachael Gandhi  Date:    07/29/2025  Time:    09:31        Additional Studies     Latest Reference Range & Units 07/29/25 09:15 07/29/25 09:16   Fluid Color   Red   Fluid Appearance   Cloudy   WBC, Body Fluid /cu mm  3,432   Segs, Fluid %  3   Monocytes/Macrophages, Fluid %  27   Glucose Body Fluid Not established mg/dL 110    LD, Fluid Not established U/L 1,034    Body Fluid, Protein Not established g/dL 4.2        Ventilator Information                  No results for input(s): "PH", "PCO2", "PO2", "HCO3", "POCSATURATED", "BE" in the last 72 hours.        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       Plan      R pleural effusion uncomplicated exudate, bloody- unclear etiology. Effusions due to portal HTN are usually transudative    - watch for pleural fluid reaccumulation  - continue diuretics  - f/u pleural fluid cytology, culture  - continue anticoagulation given portal vein, splenic vein thrombosis  - continue nebs, prednisone for suspected COPD exacerbation  - needs to quit smoking  - ok for discharge from pulmonary standpoint  - pulmonary will sign off on patient; please call with further questions or concerns  - after discharge please f/u in clinic with " me in 2-3 wks with CXR prior to visit      Christy Meléndez MD  Pulmonary & Critical Care Medicine                     [1]   Social History  Tobacco Use   Smoking Status Every Day   Smokeless Tobacco Not on file

## 2025-07-31 LAB
ALBUMIN SERPL-MCNC: 3.2 G/DL (ref 3.5–5.2)
ALP SERPL-CCNC: 75 UNIT/L (ref 55–135)
ALT SERPL-CCNC: 12 UNIT/L (ref 10–44)
ANION GAP (SMH): 6 MMOL/L (ref 8–16)
AST SERPL-CCNC: 27 UNIT/L (ref 10–40)
BACTERIA BLD CULT: NORMAL
BACTERIA BLD CULT: NORMAL
BILIRUB SERPL-MCNC: 1.7 MG/DL (ref 0.1–1)
BUN SERPL-MCNC: 14 MG/DL (ref 6–20)
CALCIUM SERPL-MCNC: 8.9 MG/DL (ref 8.7–10.5)
CHLORIDE SERPL-SCNC: 99 MMOL/L (ref 95–110)
CO2 SERPL-SCNC: 32 MMOL/L (ref 23–29)
CREAT SERPL-MCNC: 0.7 MG/DL (ref 0.5–1.4)
ERYTHROCYTE [DISTWIDTH] IN BLOOD BY AUTOMATED COUNT: 14.2 % (ref 11.5–14.5)
GFR SERPLBLD CREATININE-BSD FMLA CKD-EPI: >60 ML/MIN/1.73/M2
GLUCOSE SERPL-MCNC: 102 MG/DL (ref 70–110)
HCT VFR BLD AUTO: 41.5 % (ref 40–54)
HGB BLD-MCNC: 14.1 GM/DL (ref 14–18)
MAGNESIUM SERPL-MCNC: 2 MG/DL (ref 1.6–2.6)
MCH RBC QN AUTO: 31.4 PG (ref 27–31)
MCHC RBC AUTO-ENTMCNC: 34 G/DL (ref 32–36)
MCV RBC AUTO: 92 FL (ref 82–98)
PHOSPHATE SERPL-MCNC: 3.1 MG/DL (ref 2.7–4.5)
PLATELET # BLD AUTO: 130 K/UL (ref 150–450)
PMV BLD AUTO: 10.4 FL (ref 9.2–12.9)
POTASSIUM SERPL-SCNC: 3.2 MMOL/L (ref 3.5–5.1)
PROT SERPL-MCNC: 6.6 GM/DL (ref 6–8.4)
RBC # BLD AUTO: 4.49 M/UL (ref 4.6–6.2)
SODIUM SERPL-SCNC: 137 MMOL/L (ref 136–145)
WBC # BLD AUTO: 9.38 K/UL (ref 3.9–12.7)

## 2025-07-31 PROCEDURE — 94640 AIRWAY INHALATION TREATMENT: CPT

## 2025-07-31 PROCEDURE — 25000003 PHARM REV CODE 250: Performed by: HOSPITALIST

## 2025-07-31 PROCEDURE — 63600175 PHARM REV CODE 636 W HCPCS

## 2025-07-31 PROCEDURE — 99900035 HC TECH TIME PER 15 MIN (STAT)

## 2025-07-31 PROCEDURE — 80053 COMPREHEN METABOLIC PANEL: CPT

## 2025-07-31 PROCEDURE — 94618 PULMONARY STRESS TESTING: CPT

## 2025-07-31 PROCEDURE — 25000003 PHARM REV CODE 250

## 2025-07-31 PROCEDURE — 83735 ASSAY OF MAGNESIUM: CPT

## 2025-07-31 PROCEDURE — 85027 COMPLETE CBC AUTOMATED: CPT | Performed by: STUDENT IN AN ORGANIZED HEALTH CARE EDUCATION/TRAINING PROGRAM

## 2025-07-31 PROCEDURE — S4991 NICOTINE PATCH NONLEGEND: HCPCS

## 2025-07-31 PROCEDURE — 99900031 HC PATIENT EDUCATION (STAT)

## 2025-07-31 PROCEDURE — 11000001 HC ACUTE MED/SURG PRIVATE ROOM

## 2025-07-31 PROCEDURE — 94761 N-INVAS EAR/PLS OXIMETRY MLT: CPT

## 2025-07-31 PROCEDURE — 63600175 PHARM REV CODE 636 W HCPCS: Performed by: STUDENT IN AN ORGANIZED HEALTH CARE EDUCATION/TRAINING PROGRAM

## 2025-07-31 PROCEDURE — 36415 COLL VENOUS BLD VENIPUNCTURE: CPT | Performed by: STUDENT IN AN ORGANIZED HEALTH CARE EDUCATION/TRAINING PROGRAM

## 2025-07-31 PROCEDURE — 84100 ASSAY OF PHOSPHORUS: CPT | Performed by: STUDENT IN AN ORGANIZED HEALTH CARE EDUCATION/TRAINING PROGRAM

## 2025-07-31 PROCEDURE — 27000221 HC OXYGEN, UP TO 24 HOURS

## 2025-07-31 PROCEDURE — 25000242 PHARM REV CODE 250 ALT 637 W/ HCPCS

## 2025-07-31 RX ORDER — POTASSIUM CHLORIDE 20 MEQ/1
40 TABLET, EXTENDED RELEASE ORAL ONCE
Status: COMPLETED | OUTPATIENT
Start: 2025-07-31 | End: 2025-07-31

## 2025-07-31 RX ADMIN — ACETAMINOPHEN 650 MG: 325 TABLET ORAL at 03:07

## 2025-07-31 RX ADMIN — OXCARBAZEPINE 300 MG: 150 TABLET, FILM COATED ORAL at 09:07

## 2025-07-31 RX ADMIN — ENOXAPARIN SODIUM 80 MG: 80 INJECTION SUBCUTANEOUS at 05:07

## 2025-07-31 RX ADMIN — NICOTINE 1 PATCH: 21 PATCH, EXTENDED RELEASE TRANSDERMAL at 08:07

## 2025-07-31 RX ADMIN — IPRATROPIUM BROMIDE AND ALBUTEROL SULFATE 3 ML: 2.5; .5 SOLUTION RESPIRATORY (INHALATION) at 06:07

## 2025-07-31 RX ADMIN — CLONIDINE HYDROCHLORIDE 0.1 MG: 0.1 TABLET ORAL at 09:07

## 2025-07-31 RX ADMIN — PREDNISONE 40 MG: 20 TABLET ORAL at 08:07

## 2025-07-31 RX ADMIN — BUPRENORPHINE AND NALOXONE 1 FILM: 8; 2 FILM BUCCAL; SUBLINGUAL at 08:07

## 2025-07-31 RX ADMIN — IPRATROPIUM BROMIDE AND ALBUTEROL SULFATE 3 ML: 2.5; .5 SOLUTION RESPIRATORY (INHALATION) at 01:07

## 2025-07-31 RX ADMIN — OXCARBAZEPINE 300 MG: 150 TABLET, FILM COATED ORAL at 08:07

## 2025-07-31 RX ADMIN — BUPRENORPHINE AND NALOXONE 1 FILM: 8; 2 FILM BUCCAL; SUBLINGUAL at 09:07

## 2025-07-31 RX ADMIN — PANTOPRAZOLE SODIUM 40 MG: 40 TABLET, DELAYED RELEASE ORAL at 06:07

## 2025-07-31 RX ADMIN — FUROSEMIDE 40 MG: 10 INJECTION, SOLUTION INTRAMUSCULAR; INTRAVENOUS at 08:07

## 2025-07-31 RX ADMIN — ALUMINUM HYDROXIDE, MAGNESIUM HYDROXIDE, AND DIMETHICONE 30 ML: 200; 20; 200 SUSPENSION ORAL at 09:07

## 2025-07-31 RX ADMIN — POTASSIUM CHLORIDE 40 MEQ: 1500 TABLET, EXTENDED RELEASE ORAL at 09:07

## 2025-07-31 RX ADMIN — ALUMINUM HYDROXIDE, MAGNESIUM HYDROXIDE, AND DIMETHICONE 30 ML: 200; 20; 200 SUSPENSION ORAL at 03:07

## 2025-07-31 RX ADMIN — FUROSEMIDE 40 MG: 10 INJECTION, SOLUTION INTRAMUSCULAR; INTRAVENOUS at 05:07

## 2025-07-31 RX ADMIN — OLANZAPINE 20 MG: 5 TABLET, FILM COATED ORAL at 09:07

## 2025-07-31 RX ADMIN — ENOXAPARIN SODIUM 80 MG: 80 INJECTION SUBCUTANEOUS at 06:07

## 2025-07-31 RX ADMIN — ACETAMINOPHEN 650 MG: 325 TABLET ORAL at 02:07

## 2025-07-31 RX ADMIN — FLUOXETINE HYDROCHLORIDE 20 MG: 20 CAPSULE ORAL at 08:07

## 2025-07-31 NOTE — ASSESSMENT & PLAN NOTE
Patient's most recent potassium results are listed below.   Recent Labs     07/29/25  0528 07/30/25  0540 07/31/25  0616   K 3.3* 3.1* 3.2*     Plan  - Replete potassium per protocol  - Monitor potassium Daily  - Patient's hypokalemia is stable    Discussed with bedside nurse and .  Anticoagulation therapy to be resumed.

## 2025-07-31 NOTE — RESPIRATORY THERAPY
07/31/25 1404   Home Oxygen Qualification   $ Home O2 Qualification Pulmonary Stress Test/6 min walk;Tech time 15 minutes   Room Air SpO2 At Rest 95 %   Room Air SpO2 During Ambulation 95 %   Home O2 Eval Comments Pt does not qualify for home O2 at this time.

## 2025-07-31 NOTE — PROGRESS NOTES
[facility]  Progress Note    Patient Name: Christiano Gomez Jr.  MRN: [id number]  Patient Class: IP- Inpatient   Admission Date: 7/26/2025  Length of Stay: 3 days  Attending Physician: Maricel Smith MD  Primary Care Provider: Vkii Provider        Subjective     Principal Problem:Acute respiratory failure        HPI:  This is a pleasant 50-year-old gentleman with comorbid conditions of cirrhosis, mood disorder who presents to the emergency department with complaints of fatigue, wheezing and dyspnea.  Patient found to be markedly wheezy on admission as well as hypoxic requiring 2 L nasal cannula.  Imaging showing bilateral pleural effusions and infiltrate.  He is initiated on therapy for community-acquired pneumonia and COPD exacerbation with antibiotics, steroids and nebulizers.  Admission to Hospital Medicine.    Overview/Hospital Course:  50-year-old male with PMH per HPI is admitted due to acute hypoxic respiratory failure attributed to COPD exacerbation and a large right pleural effusion.  Also has a small left pleural effusion.  He required nasal cannula oxygen support.  Also found to have thrombosis in the portal vein, splenic vein, and superior mesenteric vein.  Initially given antibiotics for concern for pneumonia but this was ruled out and antibiotics stopped with CT imaging showing more likely atelectasis or scarring.  Treated with nebulized breathing treatments and corticosteroids.  Started on therapeutic Lovenox.  Consults to pulmonology and vascular surgery.  Vascular surgery recommended anticoagulation for his thromboses without other intervention.  Subsequent chest x-ray showed worsening a right large pleural effusion.  Interventional Radiology consulted for right-sided thoracentesis.  Anticoagulation therapy held.  Patient underwent ultrasound-guided right-sided thoracentesis on July 29, 2025 where 1700 cc blood-tinged pleural fluid removed and sent for analysis.   Patient tolerated procedure well.  Patient to be evaluated for home oxygen.  Patient's diet to be advanced.    Interval History:  Patient presented to the emergency department complaining of shortness of breath.  Patient was found to have a large right-sided pleural effusion and underwent thoracentesis with removal of 1700 mL of serosanguineous fluid.  Pulmonology following.    Review of Systems   Constitutional: Negative.    HENT: Negative.     Eyes: Negative.    Respiratory:  Positive for shortness of breath.    Cardiovascular: Negative.    Gastrointestinal: Negative.    Endocrine: Negative.    Genitourinary: Negative.    Musculoskeletal: Negative.    Skin: Negative.    Allergic/Immunologic: Negative.    Neurological: Negative.    Hematological: Negative.    Psychiatric/Behavioral: Negative.       Objective:     Vital Signs (Most Recent):  Temp: 97.5 °F (36.4 °C) (07/31/25 1154)  Pulse: 67 (07/31/25 1154)  Resp: 20 (07/31/25 1154)  BP: 104/66 (07/31/25 1154)  SpO2: 96 % (07/31/25 1154) Vital Signs (24h Range):  Temp:  [97.5 °F (36.4 °C)-98.3 °F (36.8 °C)] 97.5 °F (36.4 °C)  Pulse:  [64-76] 67  Resp:  [14-20] 20  SpO2:  [94 %-97 %] 96 %  BP: (100-121)/(63-78) 104/66     Weight: 81.1 kg (178 lb 12.7 oz)  Body mass index is 27.19 kg/m².    Intake/Output Summary (Last 24 hours) at 7/31/2025 1311  Last data filed at 7/31/2025 1027  Gross per 24 hour   Intake --   Output 1000 ml   Net -1000 ml         Physical Exam  Constitutional:       Appearance: Normal appearance. He is normal weight.   HENT:      Head: Normocephalic and atraumatic.   Eyes:      Extraocular Movements: Extraocular movements intact.      Conjunctiva/sclera: Conjunctivae normal.      Pupils: Pupils are equal, round, and reactive to light.   Cardiovascular:      Rate and Rhythm: Normal rate and regular rhythm.   Pulmonary:      Effort: Pulmonary effort is normal.      Breath sounds: Rales present.   Abdominal:      General: Abdomen is flat. Bowel sounds  are normal.      Palpations: Abdomen is soft.   Musculoskeletal:         General: Normal range of motion.      Cervical back: Normal range of motion and neck supple.   Skin:     General: Skin is warm and dry.      Capillary Refill: Capillary refill takes less than 2 seconds.   Neurological:      General: No focal deficit present.      Mental Status: He is alert and oriented to person, place, and time. Mental status is at baseline.   Psychiatric:         Mood and Affect: Mood normal.         Behavior: Behavior normal.         Thought Content: Thought content normal.         Judgment: Judgment normal.               Significant Labs: All pertinent labs within the past 24 hours have been reviewed.    Significant Imaging: I have reviewed all pertinent imaging results/findings within the past 24 hours.      Assessment & Plan  Acute respiratory failure  Patient with Hypoxic Respiratory failure which is Acute.  he is not on home oxygen. Supplemental oxygen was provided and noted-      Signs/symptoms of respiratory failure include- wheezing and hypoxia. Contributing diagnoses includes - COPD and Pleural effusion Labs and images were reviewed. Patient Has recent ABG, which has been reviewed. Will treat underlying causes and adjust management of respiratory failure as follows-  steroids, nebulizers.  Follow Pulmonary recommendations.    -patient underwent right-sided ultrasound-guided thoracentesis with 1700 cc blood-tinged pleural fluid removed on July 29, 2025.  Follow pleural fluid results.  Patient to be evaluated for home oxygen requirements.  Advance diet as tolerated.  Bipolar affective disorder  Stable   -continue home olanzapine, clonidine, and fluoxetine  Seizures  Stable  -continue home oxcarbazepine  Polysubstance abuse  Stable   -continue home Suboxone  Cirrhosis  Patient with known Cirrhosis with Child's class B. Co-morbidities are present and inclusive of portal hypertension, esophageal varices, portal venous  thrombosis, malnutrition, and anemia/thrombocytopenia.  MELD-Na score calculated; MELD 3.0: 12 at 7/28/2025  4:47 AM  MELD-Na: 13 at 7/28/2025  4:47 AM  Calculated from:  Serum Creatinine: 0.5 mg/dL (Using min of 1 mg/dL) at 7/28/2025  4:47 AM  Serum Sodium: 136 mmol/L at 7/28/2025  4:47 AM  Total Bilirubin: 1.8 mg/dL at 7/28/2025  4:47 AM  Serum Albumin: 3.2 g/dL at 7/28/2025  4:47 AM  INR(ratio): 1.3 at 7/26/2025  2:37 PM  Age at listing (hypothetical): 50 years  Sex: Male at 7/28/2025  4:47 AM    Continue chronic meds. Etiology likely Hepatitis C which was cured. Will avoid any hepatotoxic meds, and monitor CBC/CMP/INR for synthetic function.  Continue home lasix. Follows with hepatology and has had recent EGD  Bilateral pleural effusion  Increasing Large on right.  Small on left.  -follow Pulmonary recommendations.    -patient underwent right-sided ultrasound-guided thoracentesis is 1700 cc pleural fluid removed.  Follow pleural fluid results.  Portal vein thrombosis  Splenic vein thrombosis  Superior mesenteric vein thrombosis  Seen on CT imaging.  Generally asymptomatic  -vascular surgery consult, recommended anticoagulation  -start therapeutic Lovenox now.  Likely DOAC on discharge, being held for the need for thoracentesis.  -monitor platelets  Seen on CT imaging.  Generally asymptomatic  -vascular surgery consult, recommended anticoagulation  -start therapeutic Lovenox now.  Likely DOAC on discharge  -monitor platelets  Seen on CT imaging.  Generally asymptomatic  -vascular surgery consult, recommended anticoagulation  -start therapeutic Lovenox now.  Likely DOAC on discharge  -monitor platelets  COPD exacerbation  Patient's COPD is with exacerbation noted by worsening of baseline hypoxia and wheezing currently.  Patient is currently off COPD Pathway. Continue scheduled inhalers, Steroids and Supplemental oxygen and monitor respiratory status closely.   Thrombocytopenia  The likely etiology of  thrombocytopenia is liver disease. The patients 3 most recent labs are listed below.  Recent Labs     07/29/25  0528 07/30/25  0540 07/31/25  0616   * 121* 130*     Plan  - Will transfuse if platelet count is <10k or if less than 50 K with active bleeding or planned procedure.  - CBC daily  - monitor close while on therapeutic Lovenox  Hypokalemia  Patient's most recent potassium results are listed below.   Recent Labs     07/29/25  0528 07/30/25  0540 07/31/25  0616   K 3.3* 3.1* 3.2*     Plan  - Replete potassium per protocol  - Monitor potassium Daily  - Patient's hypokalemia is stable    Discussed with bedside nurse and .  Anticoagulation therapy to be resumed.  Tobacco dependency  Smoking cessation counseling performed. Dangers of cigarette smoking were reviewed with patient in detail and patient was encouraged to quit. Nicotine replacement options were discussed for > 3 minutes.    Discussed with bedside nurse and .  Patient encouraged to get out of bed and increase ambulation.  VTE Risk Mitigation (From admission, onward)           Ordered     enoxaparin injection 80 mg  Every 12 hours         07/27/25 1132     IP VTE HIGH RISK PATIENT  Once         07/26/25 1721     Place sequential compression device  Until discontinued         07/26/25 1721                    Discharge Planning   SHAKIRA: 7/31/2025     Code Status: Full Code   Medical Readiness for Discharge Date:   Discharge Plan A: Home with family                        Maricel Smith MD  Department of Hospital Medicine   [facility]

## 2025-07-31 NOTE — SUBJECTIVE & OBJECTIVE
Interval History:  Patient presented to the emergency department complaining of shortness of breath.  Patient was found to have a large right-sided pleural effusion and underwent thoracentesis with removal of 1700 mL of serosanguineous fluid.  Pulmonology following.    Review of Systems   Constitutional: Negative.    HENT: Negative.     Eyes: Negative.    Respiratory:  Positive for shortness of breath.    Cardiovascular: Negative.    Gastrointestinal: Negative.    Endocrine: Negative.    Genitourinary: Negative.    Musculoskeletal: Negative.    Skin: Negative.    Allergic/Immunologic: Negative.    Neurological: Negative.    Hematological: Negative.    Psychiatric/Behavioral: Negative.       Objective:     Vital Signs (Most Recent):  Temp: 97.5 °F (36.4 °C) (07/31/25 1154)  Pulse: 67 (07/31/25 1154)  Resp: 20 (07/31/25 1154)  BP: 104/66 (07/31/25 1154)  SpO2: 96 % (07/31/25 1154) Vital Signs (24h Range):  Temp:  [97.5 °F (36.4 °C)-98.3 °F (36.8 °C)] 97.5 °F (36.4 °C)  Pulse:  [64-76] 67  Resp:  [14-20] 20  SpO2:  [94 %-97 %] 96 %  BP: (100-121)/(63-78) 104/66     Weight: 81.1 kg (178 lb 12.7 oz)  Body mass index is 27.19 kg/m².    Intake/Output Summary (Last 24 hours) at 7/31/2025 1311  Last data filed at 7/31/2025 1027  Gross per 24 hour   Intake --   Output 1000 ml   Net -1000 ml         Physical Exam  Constitutional:       Appearance: Normal appearance. He is normal weight.   HENT:      Head: Normocephalic and atraumatic.   Eyes:      Extraocular Movements: Extraocular movements intact.      Conjunctiva/sclera: Conjunctivae normal.      Pupils: Pupils are equal, round, and reactive to light.   Cardiovascular:      Rate and Rhythm: Normal rate and regular rhythm.   Pulmonary:      Effort: Pulmonary effort is normal.      Breath sounds: Rales present.   Abdominal:      General: Abdomen is flat. Bowel sounds are normal.      Palpations: Abdomen is soft.   Musculoskeletal:         General: Normal range of motion.       Cervical back: Normal range of motion and neck supple.   Skin:     General: Skin is warm and dry.      Capillary Refill: Capillary refill takes less than 2 seconds.   Neurological:      General: No focal deficit present.      Mental Status: He is alert and oriented to person, place, and time. Mental status is at baseline.   Psychiatric:         Mood and Affect: Mood normal.         Behavior: Behavior normal.         Thought Content: Thought content normal.         Judgment: Judgment normal.               Significant Labs: All pertinent labs within the past 24 hours have been reviewed.    Significant Imaging: I have reviewed all pertinent imaging results/findings within the past 24 hours.

## 2025-07-31 NOTE — PLAN OF CARE
Problem: COPD (Chronic Obstructive Pulmonary Disease)  Goal: Optimal Chronic Illness Coping  Outcome: Progressing  Goal: Optimal Level of Functional [place]  Outcome: Progressing  Goal: Absence of Infection Signs and Symptoms  Outcome: Progressing     Problem: Adult Inpatient Plan of Care  Goal: Plan of Care Review  Outcome: Progressing  Goal: Patient-Specific Goal (Individualized)  Outcome: Progressing  Goal: Absence of Hospital-Acquired Illness or Injury  Outcome: Progressing     Problem: Sepsis/Septic Shock  Goal: Optimal Coping  Outcome: Progressing  Goal: Absence of Bleeding  Outcome: Progressing  Goal: Blood Glucose Level Within Targeted Range  Outcome: Progressing     Problem: Pneumonia  Goal: Fluid Balance  Outcome: Progressing  Goal: Resolution of Infection Signs and Symptoms  Outcome: Progressing

## 2025-07-31 NOTE — CARE UPDATE
07/30/25 1958   Patient Assessment/Suction   Level of Consciousness (AVPU) alert   Respiratory Effort Normal   Expansion/Accessory Muscles/Retractions no retractions;no use of accessory muscles;expansion symmetric   All Lung Fields Breath Sounds clear;diminished   JARAD Breath Sounds diminished;clear   LLL Breath Sounds diminished;clear   RUL Breath Sounds diminished;clear   RML Breath Sounds diminished;clear   RLL Breath Sounds diminished;clear   Rhythm/Pattern, Respiratory pattern regular;depth regular;unlabored   Cough Frequency infrequent   Skin Integrity   $ Wound Care Tech Time 15 min   Area Observed Left;Right;Behind ear;Nares   Skin Appearance without discoloration   PRE-TX-O2   Device (Oxygen Therapy) nasal cannula   Flow (L/min) (Oxygen Therapy) 2   SpO2 97 %   Pulse Oximetry Type Intermittent   Pulse 73   Resp 14   Aerosol Therapy   $ Aerosol Therapy Charges Aerosol Treatment   Daily Review of Necessity (SVN) completed   Respiratory Treatment Status (SVN) given   Treatment Route (SVN) mask;oxygen   Patient Position HOB elevated   Post Treatment Assessment (SVN) increased aeration   Signs of Intolerance (SVN) none   Breath Sounds Post-Respiratory Treatment   Throughout All Fields Post-Treatment All Fields   Throughout All Fields Post-Treatment no change   Post-treatment Heart Rate (beats/min) 78   Post-treatment Resp Rate (breaths/min) 12

## 2025-07-31 NOTE — ASSESSMENT & PLAN NOTE
The likely etiology of thrombocytopenia is liver disease. The patients 3 most recent labs are listed below.  Recent Labs     07/29/25  0528 07/30/25  0540 07/31/25  0616   * 121* 130*     Plan  - Will transfuse if platelet count is <10k or if less than 50 K with active bleeding or planned procedure.  - CBC daily  - monitor close while on therapeutic Lovenox

## 2025-07-31 NOTE — CARE UPDATE
07/31/25 1852   Patient Assessment/Suction   Level of Consciousness (AVPU) alert   Respiratory Effort Unlabored   Expansion/Accessory Muscles/Retractions no retractions;no use of accessory muscles   All Lung Fields Breath Sounds clear;diminished   Rhythm/Pattern, Respiratory unlabored   PRE-TX-O2   Device (Oxygen Therapy) room air   SpO2 96 %   Pulse Oximetry Type Intermittent   $ Pulse Oximetry - Multiple Charge Pulse Oximetry - Multiple   Pulse 62   Resp 17   Aerosol Therapy   $ Aerosol Therapy Charges Aerosol Treatment   Respiratory Treatment Status (SVN) given   Treatment Route (SVN) mask   Patient Position semi-Ebarden's   Post Treatment Assessment (SVN) increased aeration   Signs of Intolerance (SVN) none   Breath Sounds Post-Respiratory Treatment   Post-treatment Heart Rate (beats/min) 65   Post-treatment Resp Rate (breaths/min) 17

## 2025-07-31 NOTE — PLAN OF CARE
Problem: COPD (Chronic Obstructive Pulmonary Disease)  Goal: Optimal Chronic Illness Coping  Outcome: Progressing  Goal: Optimal Level of Functional [place]  Outcome: Progressing  Goal: Absence of Infection Signs and Symptoms  Outcome: Progressing  Goal: Improved Oral Intake  Outcome: Progressing  Goal: Effective Oxygenation and Ventilation  Outcome: Progressing

## 2025-07-31 NOTE — ASSESSMENT & PLAN NOTE
Patient with Hypoxic Respiratory failure which is Acute.  he is not on home oxygen. Supplemental oxygen was provided and noted-      Signs/symptoms of respiratory failure include- wheezing and hypoxia. Contributing diagnoses includes - COPD and Pleural effusion Labs and images were reviewed. Patient Has recent ABG, which has been reviewed. Will treat underlying causes and adjust management of respiratory failure as follows-  steroids, nebulizers.  Follow Pulmonary recommendations.    -patient underwent right-sided ultrasound-guided thoracentesis with 1700 cc blood-tinged pleural fluid removed on July 29, 2025.  Follow pleural fluid results.  Patient to be evaluated for home oxygen requirements.  Advance diet as tolerated.

## 2025-07-31 NOTE — CARE UPDATE
07/31/25 0103   Patient Assessment/Suction   Level of Consciousness (AVPU) alert   PRE-TX-O2   Device (Oxygen Therapy) nasal cannula   $ Is the patient on Low Flow Oxygen? Yes   SpO2 97 %   Pulse Oximetry Type Intermittent   $ Pulse Oximetry - Multiple Charge Pulse Oximetry - Multiple

## 2025-07-31 NOTE — PLAN OF CARE
Problem: COPD (Chronic Obstructive Pulmonary Disease)  Goal: Optimal Chronic Illness Coping  7/31/2025 0131 by Julien Samaniego RN  Outcome: Progressing  7/30/2025 2322 by Julien Samaniego RN  Outcome: Progressing  Goal: Optimal Level of Functional [place]  7/31/2025 0131 by Julien Samaniego RN  Outcome: Progressing  7/30/2025 2322 by Julien Samaniego RN  Outcome: Progressing  Goal: Absence of Infection Signs and Symptoms  7/31/2025 0131 by Julien Samaniego RN  Outcome: Progressing  7/30/2025 2322 by Julien Samaniego RN  Outcome: Progressing  Goal: Improved Oral Intake  Outcome: Progressing     Problem: Adult Inpatient Plan of Care  Goal: Plan of Care Review  7/31/2025 0131 by Julien Samaniego RN  Outcome: Progressing  7/30/2025 2322 by Julien Samaniego RN  Outcome: Progressing  Goal: Patient-Specific Goal (Individualized)  7/31/2025 0131 by Julien Samaniego RN  Outcome: Progressing  7/30/2025 2322 by Julien Samaniego RN  Outcome: Progressing  Goal: Absence of Hospital-Acquired Illness or Injury  Outcome: Progressing  Goal: Optimal Comfort and Wellbeing  Outcome: Progressing  Goal: Readiness for Transition of Care  Outcome: Progressing     Problem: Sepsis/Septic Shock  Goal: Optimal Coping  Outcome: Progressing  Goal: Absence of Bleeding  Outcome: Progressing  Goal: Blood Glucose Level Within Targeted Range  Outcome: Progressing     Problem: Constipation  Goal: Effective Bowel Elimination  Outcome: Progressing

## 2025-07-31 NOTE — RESPIRATORY THERAPY
07/31/25 0657   Patient Assessment/Suction   Level of Consciousness (AVPU) alert   Respiratory Effort Unlabored   All Lung Fields Breath Sounds diminished;clear   Skin Integrity   $ Wound Care Tech Time 15 min   Area Observed Left;Right;Behind ear;Cheek   Skin Appearance without discoloration   PRE-TX-O2   Device (Oxygen Therapy) nasal cannula   $ Is the patient on Low Flow Oxygen? Yes   Flow (L/min) (Oxygen Therapy) 2   SpO2 97 %   Pulse Oximetry Type Intermittent   $ Pulse Oximetry - Multiple Charge Pulse Oximetry - Multiple   Pulse 71   Resp 18   Aerosol Therapy   $ Aerosol Therapy Charges Aerosol Treatment   Respiratory Treatment Status (SVN) given   Treatment Route (SVN) mask   Patient Position semi-Bearden's   Post Treatment Assessment (SVN) increased aeration   Signs of Intolerance (SVN) none   Breath Sounds Post-Respiratory Treatment   Post-treatment Heart Rate (beats/min) 65   Post-treatment Resp Rate (breaths/min) 16   Education   $ Education Bronchodilator

## 2025-08-01 VITALS
HEIGHT: 68 IN | OXYGEN SATURATION: 93 % | WEIGHT: 178.81 LBS | TEMPERATURE: 98 F | SYSTOLIC BLOOD PRESSURE: 118 MMHG | HEART RATE: 72 BPM | BODY MASS INDEX: 27.1 KG/M2 | RESPIRATION RATE: 18 BRPM | DIASTOLIC BLOOD PRESSURE: 77 MMHG

## 2025-08-01 LAB
ALBUMIN SERPL-MCNC: 3.2 G/DL (ref 3.5–5.2)
ALP SERPL-CCNC: 72 UNIT/L (ref 55–135)
ALT SERPL-CCNC: 10 UNIT/L (ref 10–44)
ANION GAP (SMH): 6 MMOL/L (ref 8–16)
AST SERPL-CCNC: 21 UNIT/L (ref 10–40)
BILIRUB SERPL-MCNC: 1.3 MG/DL (ref 0.1–1)
BUN SERPL-MCNC: 18 MG/DL (ref 6–20)
CALCIUM SERPL-MCNC: 8.7 MG/DL (ref 8.7–10.5)
CHLORIDE SERPL-SCNC: 99 MMOL/L (ref 95–110)
CO2 SERPL-SCNC: 32 MMOL/L (ref 23–29)
CREAT SERPL-MCNC: 0.7 MG/DL (ref 0.5–1.4)
CULTURE, FLUID (SMH): NO GROWTH
ERYTHROCYTE [DISTWIDTH] IN BLOOD BY AUTOMATED COUNT: 14.3 % (ref 11.5–14.5)
GFR SERPLBLD CREATININE-BSD FMLA CKD-EPI: >60 ML/MIN/1.73/M2
GLUCOSE SERPL-MCNC: 106 MG/DL (ref 70–110)
GRAM STN SPEC: NORMAL
GRAM STN SPEC: NORMAL
HCT VFR BLD AUTO: 40.3 % (ref 40–54)
HGB BLD-MCNC: 13.7 GM/DL (ref 14–18)
MAGNESIUM SERPL-MCNC: 2.3 MG/DL (ref 1.6–2.6)
MCH RBC QN AUTO: 30.7 PG (ref 27–31)
MCHC RBC AUTO-ENTMCNC: 34 G/DL (ref 32–36)
MCV RBC AUTO: 90 FL (ref 82–98)
PHOSPHATE SERPL-MCNC: 3.7 MG/DL (ref 2.7–4.5)
PLATELET # BLD AUTO: 123 K/UL (ref 150–450)
PMV BLD AUTO: 10.3 FL (ref 9.2–12.9)
POTASSIUM SERPL-SCNC: 3.4 MMOL/L (ref 3.5–5.1)
PROT SERPL-MCNC: 6.3 GM/DL (ref 6–8.4)
RBC # BLD AUTO: 4.46 M/UL (ref 4.6–6.2)
SODIUM SERPL-SCNC: 137 MMOL/L (ref 136–145)
WBC # BLD AUTO: 8.19 K/UL (ref 3.9–12.7)

## 2025-08-01 PROCEDURE — 94761 N-INVAS EAR/PLS OXIMETRY MLT: CPT

## 2025-08-01 PROCEDURE — 63600175 PHARM REV CODE 636 W HCPCS: Performed by: STUDENT IN AN ORGANIZED HEALTH CARE EDUCATION/TRAINING PROGRAM

## 2025-08-01 PROCEDURE — 25000003 PHARM REV CODE 250

## 2025-08-01 PROCEDURE — 63600175 PHARM REV CODE 636 W HCPCS

## 2025-08-01 PROCEDURE — 36415 COLL VENOUS BLD VENIPUNCTURE: CPT | Performed by: STUDENT IN AN ORGANIZED HEALTH CARE EDUCATION/TRAINING PROGRAM

## 2025-08-01 PROCEDURE — 25000242 PHARM REV CODE 250 ALT 637 W/ HCPCS

## 2025-08-01 PROCEDURE — 84100 ASSAY OF PHOSPHORUS: CPT | Performed by: STUDENT IN AN ORGANIZED HEALTH CARE EDUCATION/TRAINING PROGRAM

## 2025-08-01 PROCEDURE — 80053 COMPREHEN METABOLIC PANEL: CPT

## 2025-08-01 PROCEDURE — 94640 AIRWAY INHALATION TREATMENT: CPT

## 2025-08-01 PROCEDURE — 83735 ASSAY OF MAGNESIUM: CPT

## 2025-08-01 PROCEDURE — S4991 NICOTINE PATCH NONLEGEND: HCPCS

## 2025-08-01 PROCEDURE — 85027 COMPLETE CBC AUTOMATED: CPT | Performed by: STUDENT IN AN ORGANIZED HEALTH CARE EDUCATION/TRAINING PROGRAM

## 2025-08-01 RX ORDER — IBUPROFEN 200 MG
1 TABLET ORAL DAILY
Qty: 30 PATCH | Refills: 1 | Status: SHIPPED | OUTPATIENT
Start: 2025-08-01

## 2025-08-01 RX ORDER — PREDNISONE 10 MG/1
10 TABLET ORAL DAILY
Qty: 14 TABLET | Refills: 0 | Status: SHIPPED | OUTPATIENT
Start: 2025-08-01 | End: 2025-08-15

## 2025-08-01 RX ORDER — FUROSEMIDE 20 MG/1
60 TABLET ORAL DAILY
Qty: 90 TABLET | Refills: 0 | Status: SHIPPED | OUTPATIENT
Start: 2025-08-01 | End: 2026-08-01

## 2025-08-01 RX ADMIN — OXCARBAZEPINE 300 MG: 150 TABLET, FILM COATED ORAL at 08:08

## 2025-08-01 RX ADMIN — FLUOXETINE HYDROCHLORIDE 20 MG: 20 CAPSULE ORAL at 08:08

## 2025-08-01 RX ADMIN — FUROSEMIDE 40 MG: 10 INJECTION, SOLUTION INTRAMUSCULAR; INTRAVENOUS at 08:08

## 2025-08-01 RX ADMIN — NICOTINE 1 PATCH: 21 PATCH, EXTENDED RELEASE TRANSDERMAL at 08:08

## 2025-08-01 RX ADMIN — BUPRENORPHINE AND NALOXONE 1 FILM: 8; 2 FILM BUCCAL; SUBLINGUAL at 08:08

## 2025-08-01 RX ADMIN — PANTOPRAZOLE SODIUM 40 MG: 40 TABLET, DELAYED RELEASE ORAL at 05:08

## 2025-08-01 RX ADMIN — ENOXAPARIN SODIUM 80 MG: 80 INJECTION SUBCUTANEOUS at 05:08

## 2025-08-01 RX ADMIN — IPRATROPIUM BROMIDE AND ALBUTEROL SULFATE 3 ML: 2.5; .5 SOLUTION RESPIRATORY (INHALATION) at 07:08

## 2025-08-01 NOTE — ASSESSMENT & PLAN NOTE
The likely etiology of thrombocytopenia is liver disease. The patients 3 most recent labs are listed below.  Recent Labs     07/30/25  0540 07/31/25  0616 08/01/25  0520   * 130* 123*     Plan  - Will transfuse if platelet count is <10k or if less than 50 K with active bleeding or planned procedure.  - CBC daily  - monitor close while on therapeutic Lovenox

## 2025-08-01 NOTE — CARE UPDATE
08/01/25 0745   Patient Assessment/Suction   Level of Consciousness (AVPU) alert   Respiratory Effort Normal;Unlabored   Expansion/Accessory Muscles/Retractions no use of accessory muscles;no retractions   All Lung Fields Breath Sounds diminished;clear   JARAD Breath Sounds diminished;clear   LLL Breath Sounds diminished   RUL Breath Sounds diminished;clear   RML Breath Sounds diminished   RLL Breath Sounds diminished   Rhythm/Pattern, Respiratory unlabored;pattern regular   Cough Frequency no cough   PRE-TX-O2   Device (Oxygen Therapy) room air   SpO2 96 %   Pulse Oximetry Type Intermittent   $ Pulse Oximetry - Multiple Charge Pulse Oximetry - Multiple   Pulse 66   Resp 18   Aerosol Therapy   $ Aerosol Therapy Charges Aerosol Treatment   Respiratory Treatment Status (SVN) given   Treatment Route (SVN) mask   Patient Position HOB elevated   Post Treatment Assessment (SVN) breath sounds unchanged   Signs of Intolerance (SVN) none   Breath Sounds Post-Respiratory Treatment   Throughout All Fields Post-Treatment All Fields   Throughout All Fields Post-Treatment no change   Post-treatment Heart Rate (beats/min) 66   Post-treatment Resp Rate (breaths/min) 18

## 2025-08-01 NOTE — ASSESSMENT & PLAN NOTE
Seen on CT imaging.  Generally asymptomatic  -vascular surgery consult, recommended anticoagulation  -start therapeutic Lovenox now.  Likely DOAC on discharge, being held for the need for thoracentesis.  -monitor platelets  Seen on CT imaging.  Generally asymptomatic  -vascular surgery consult, recommended anticoagulation  -start therapeutic Lovenox now.  Likely DOAC on discharge  -monitor platelets  Seen on CT imaging.  Generally asymptomatic  -vascular surgery consult, recommended anticoagulation  -start therapeutic Lovenox now.  Likely DOAC on discharge  -monitor platelets  Seen on CT imaging.  Generally asymptomatic  -vascular surgery consult, recommended anticoagulation  -start therapeutic Lovenox now.  Likely DOAC on discharge, being held for the need for thoracentesis.  -monitor platelets  Seen on CT imaging.  Generally asymptomatic  -vascular surgery consult, recommended anticoagulation  -start therapeutic Lovenox now.  Likely DOAC on discharge  -monitor platelets  Seen on CT imaging.  Generally asymptomatic  -vascular surgery consult, recommended anticoagulation  -start therapeutic Lovenox now.  Likely DOAC on discharge  -monitor platelets  Seen on CT imaging.  Generally asymptomatic  -vascular surgery consult, recommended anticoagulation  -start therapeutic Lovenox now.  Likely DOAC on discharge, being held for the need for thoracentesis.  -monitor platelets  Seen on CT imaging.  Generally asymptomatic  -vascular surgery consult, recommended anticoagulation  -start therapeutic Lovenox now.  Likely DOAC on discharge  -monitor platelets  Seen on CT imaging.  Generally asymptomatic  -vascular surgery consult, recommended anticoagulation  -start therapeutic Lovenox now.  Likely DOAC on discharge  -monitor platelets

## 2025-08-01 NOTE — NURSING
Pt adequate for discharge. IV removed. Pt brought out via wheelchair. All pt belongings brought with mother. Meds from pharmacy delivered. Pt in stable condition when leaving.

## 2025-08-01 NOTE — DISCHARGE SUMMARY
[facility] Medicine  Discharge Summary      Patient Name: Christiano Gomez Jr.  MRN: [id number]  [facility]: [id number]  Patient Class: IP- Inpatient  Admission Date: 7/26/2025  Hospital Length of Stay: 4 days  Discharge Date and Time: 08/01/2025 10:22 AM  Attending Physician: Oly Santos MD   Discharging Provider: Oly Santos MD  Primary Care Provider: Erik Drew    Primary Care Team: Networked reference to record PCT     HPI:   This is a pleasant 50-year-old gentleman with comorbid conditions of cirrhosis, mood disorder who presents to the emergency department with complaints of fatigue, wheezing and dyspnea.  Patient found to be markedly wheezy on admission as well as hypoxic requiring 2 L nasal cannula.  Imaging showing bilateral pleural effusions and infiltrate.  He is initiated on therapy for community-acquired pneumonia and COPD exacerbation with antibiotics, steroids and nebulizers.  Admission to Hospital Medicine.    * No surgery found *      Hospital Course:   50-year-old male with PMH per HPI is admitted due to acute hypoxic respiratory failure attributed to COPD exacerbation and a large right pleural effusion.  Also has a small left pleural effusion.  He required nasal cannula oxygen support.  Also found to have thrombosis in the portal vein, splenic vein, and superior mesenteric vein.  Initially given antibiotics for concern for pneumonia but this was ruled out and antibiotics stopped with CT imaging showing more likely atelectasis or scarring.  Treated with nebulized breathing treatments and corticosteroids.  Started on therapeutic Lovenox.  Patient was evaluated by Pulmonary Medicine and vascular surgery. Vascular surgery recommended anticoagulation for his thromboses without other intervention.  Subsequent chest x-ray showed worsening a right large pleural effusion.  Interventional Radiology consulted for right-sided thoracentesis.  Anticoagulation therapy held.  Patient  underwent ultrasound-guided right-sided thoracentesis on July 29, 2025 where 1700 cc blood-tinged pleural fluid removed and sent for analysis.  Patient tolerated procedure well.  Patient to be evaluated for home oxygen.  Patient's diet to be advanced.  Preliminary pleural fluid cytology results are negative for malignancy.  Patient is not requiring any supplemental oxygen.  Patient has been cleared for discharge by Pulmonary Medicine and will have a follow-up chest x-ray performed in 2 weeks and will see Dr. Gilmore.  During hospital stay patient was extensively counseled regarding dangers of tobacco cigarette smoking and smoking cessation counseling performed.  Patient to improve oral protein intake.  Fall precautions and bleeding risk related to long-term anticoagulation therapy Eliquis discussed with the patient.  Patient will have a surveillance CBC and BMP checked next week.  Patient feels ready to go home. Patient was deemed appropriate for discharge.  At the time of discharge, the plan of care was discussed with patient, who were agreeable and amenable.  All medications were verbally reviewed and discussed with the patient.  They endorsed understanding and compliance.  Informed them that these changes will be available on their discharge paperwork, as well.  Outpatient follow-ups were scheduled, or if unable to be scheduled ambulatory referrals were placed, and ER return precautions were given.  All questions were answered to the patient's satisfaction.  she was subsequently discharged in stable condition. Follow-up with [facility], Provider next week. For worsening symptoms, chest pain, shortness of breath, increased abdominal pain, high grade fever, stroke or stroke like symptoms, immediately go to the nearest Emergency Room or call 911 as soon as possible.       Goals of Care Treatment Preferences:  Code Status: Full Code         Consults:   Consults (From admission, onward)          Status Ordering  Provider     Inpatient consult to Pulmonology  Once        Provider:  Christy Meléndez MD    Completed LUIS GANNON     Inpatient consult to Vascular Surgery  Once        Provider:  Segun Mandel MD    Completed LUIS GANNON          Microbiology Results (last 7 days)       Procedure Component Value Units Date/Time    Culture, Body Fluid (Aerobic) w/ GS [[id number]] Collected: 07/29/25 0915    Order Status: Completed Specimen: Body Fluid from Pleural Fluid Updated: 08/01/25 0854     CULTURE, FLUID No Growth     GRAM STAIN Rare WBC seen      No organisms seen    Blood culture x two cultures. Draw prior to antibiotics. [[id number]]  (Normal) Collected: 07/26/25 1453    Order Status: Completed Specimen: Blood from Peripheral, Forearm, Right Updated: 07/31/25 1601     CULTURE, BLOOD ([facility]) No Growth After 5 Days    Blood culture x two cultures. Draw prior to antibiotics. [[id number]]  (Normal) Collected: 07/26/25 1502    Order Status: Completed Specimen: Blood from Peripheral, Hand, Left Updated: 07/31/25 1601     CULTURE, BLOOD ([facility]) No Growth After 5 Days    Influenza A & B by Molecular [[id number]]  (Normal) Collected: 07/26/25 1444    Order Status: Completed Specimen: Nasal Swab Updated: 07/26/25 1534     INFLUENZA A MOLECULAR Negative     INFLUENZA B MOLECULAR  Negative    Group A Strep, Molecular [[id number]]  (Normal) Collected: 07/26/25 1444    Order Status: Completed Specimen: Throat Updated: 07/26/25 1516     Group A Strep Molecular Negative          CXR:  Small bilateral pleural effusions and adjacent atelectasis/consolidation, new/worse from the previous exam.      CTA Chest:  1. No evidence of pulmonary embolism.  2. Large right and small left pleural effusions with areas of linear atelectasis versus scarring involving the lungs bilaterally.     CT abdomen and pelvis with IV contrast:  1. Cirrhosis and portal hypertension with splenomegaly and upper abdominal and paraesophageal varices.  2.  Partially occlusive portal vein thrombus is present.  Occlusive splenic vein thrombus and partially occlusive thrombus is present in the superior mesenteric vein.     US abdomen:  Cirrhosis, splenomegaly, and bilateral pleural effusions.      CXR:  Slight interval worsening right greater than left layering pleural effusions and adjacent atelectasis/consolidation.      ECHO:    Left Ventricle: The left ventricle is normal in size. Normal wall thickness. There is normal systolic function with a visually estimated ejection fraction of 55 - 60%. There is normal diastolic function.    Right Ventricle: The right ventricle is normal in size. Systolic function is normal.    Left Atrium: The left atrium is mildly dilated.    Mitral Valve: There is mild regurgitation.    Tricuspid Valve: There is mild regurgitation. The estimated PA systolic pressure is at least 20 mmHg.  .    IVC/SVC: Normal venous pressure at 3 mmHg.    Pericardium: There is a small effusion adjacent to the right atrium. Pericardial effusion is echolucent. No indication of cardiac tamponade. Left pleural effusion.     US guided right thoracentesis:  After obtaining informed written consent, utilizing ultrasound guidance and local anesthesia (1% lidocaine) routine thoracentesis was performed via a right posterolateral approach utilizing a 6 Bengali pigtail type catheter. This yielded 1750 cc of blood-tinged fluid. Following removal of the catheter, a bandage was placed at the entry site. The patient tolerated the procedure well without immediate postprocedure complication and was subsequently transported to the radiography suite for postprocedure imaging.      CXR:  Stable small left pleural effusion with left lower lobe atelectasis   Assessment & Plan  Acute respiratory failure  Patient with Hypoxic Respiratory failure which is Acute.  he is not on home oxygen. Supplemental oxygen was provided and noted-      Signs/symptoms of respiratory failure include-  wheezing and hypoxia. Contributing diagnoses includes - COPD and Pleural effusion Labs and images were reviewed. Patient Has recent ABG, which has been reviewed. Will treat underlying causes and adjust management of respiratory failure as follows-  steroids, nebulizers.  Follow Pulmonary recommendations.    -patient underwent right-sided ultrasound-guided thoracentesis with 1700 cc blood-tinged pleural fluid removed on July 29, 2025.  Follow pleural fluid results.  Patient to be evaluated for home oxygen requirements.  Advance diet as tolerated.  Bipolar affective disorder  Stable   -continue home olanzapine, clonidine, and fluoxetine  Seizures  Stable  -continue home oxcarbazepine  Polysubstance abuse  Stable   -continue home Suboxone  Cirrhosis  Patient with known Cirrhosis with Child's class B. Co-morbidities are present and inclusive of portal hypertension, esophageal varices, portal venous thrombosis, malnutrition, and anemia/thrombocytopenia.  MELD-Na score calculated; MELD 3.0: 12 at 7/28/2025  4:47 AM  MELD-Na: 13 at 7/28/2025  4:47 AM  Calculated from:  Serum Creatinine: 0.5 mg/dL (Using min of 1 mg/dL) at 7/28/2025  4:47 AM  Serum Sodium: 136 mmol/L at 7/28/2025  4:47 AM  Total Bilirubin: 1.8 mg/dL at 7/28/2025  4:47 AM  Serum Albumin: 3.2 g/dL at 7/28/2025  4:47 AM  INR(ratio): 1.3 at 7/26/2025  2:37 PM  Age at listing (hypothetical): 50 years  Sex: Male at 7/28/2025  4:47 AM    Continue chronic meds. Etiology likely Hepatitis C which was cured. Will avoid any hepatotoxic meds, and monitor CBC/CMP/INR for synthetic function.  Continue home lasix. Follows with hepatology and has had recent EGD  Bilateral pleural effusion  Increasing Large on right.  Small on left.  -follow Pulmonary recommendations.    -patient underwent right-sided ultrasound-guided thoracentesis is 1700 cc pleural fluid removed.  Follow pleural fluid results.  Portal vein thrombosis  Splenic vein thrombosis  Superior mesenteric vein  thrombosis  Seen on CT imaging.  Generally asymptomatic  -vascular surgery consult, recommended anticoagulation  -start therapeutic Lovenox now.  Likely DOAC on discharge, being held for the need for thoracentesis.  -monitor platelets  Seen on CT imaging.  Generally asymptomatic  -vascular surgery consult, recommended anticoagulation  -start therapeutic Lovenox now.  Likely DOAC on discharge  -monitor platelets  Seen on CT imaging.  Generally asymptomatic  -vascular surgery consult, recommended anticoagulation  -start therapeutic Lovenox now.  Likely DOAC on discharge  -monitor platelets  COPD exacerbation  Patient's COPD is with exacerbation noted by worsening of baseline hypoxia and wheezing currently.  Patient is currently off COPD Pathway. Continue scheduled inhalers, Steroids and Supplemental oxygen and monitor respiratory status closely.   Thrombocytopenia  The likely etiology of thrombocytopenia is liver disease. The patients 3 most recent labs are listed below.  Recent Labs     07/30/25  0540 07/31/25  0616 08/01/25  0520   * 130* 123*     Plan  - Will transfuse if platelet count is <10k or if less than 50 K with active bleeding or planned procedure.  - CBC daily  - monitor close while on therapeutic Lovenox  Hypokalemia  Patient's most recent potassium results are listed below.   Recent Labs     07/30/25  0540 07/31/25  0616 08/01/25  0520   K 3.1* 3.2* 3.4*     Plan  - Replete potassium per protocol  - Monitor potassium Daily  - Patient's hypokalemia is stable    Discussed with bedside nurse and .  Anticoagulation therapy to be resumed.  Tobacco dependency  Smoking cessation counseling performed. Dangers of cigarette smoking were reviewed with patient in detail and patient was encouraged to quit. Nicotine replacement options were discussed for > 3 minutes.    Discussed with bedside nurse and .  Patient encouraged to get out of bed and increase ambulation.  Final Active  Diagnoses:    Diagnosis Date Noted POA    PRINCIPAL PROBLEM:  Acute respiratory failure [J96.00] 03/28/2018 Yes    Tobacco dependency [F17.200] 07/29/2025 Yes    Bilateral pleural effusion [J90] 07/27/2025 Yes    Portal vein thrombosis [I81] 07/27/2025 Yes    Splenic vein thrombosis [I82.890] 07/27/2025 Yes    Superior mesenteric vein thrombosis [K55.069] 07/27/2025 Yes    COPD exacerbation [J44.1] 07/27/2025 Yes    Thrombocytopenia [D69.6] 07/27/2025 Yes    Hypokalemia [E87.6] 07/27/2025 Yes    Cirrhosis [K74.60] 07/26/2025 Yes    Seizures [R56.9] 03/27/2018 Yes    Polysubstance abuse [F19.10] 03/27/2018 Yes    Bipolar affective disorder [F31.9] 03/27/2018 Yes      Problems Resolved During this Admission:       Discharged Condition: good    Disposition: Home or Self Care    Follow Up:   Follow-up Information       Christy Meléndez MD Follow up.    Specialties: Pulmonary Disease, Critical Care Medicine  Why: follow up requested. office should contact you with appt.  Contact information:  [address] [facility]  [address]  [place] [address]  [phone]               [facility] Follow up.    Why: follow up appt requested. office should contact you with appt.  Contact information:  [address]  [place] [address]  [phone]                           Patient Instructions:      X-Ray Chest PA And Lateral   Standing Status: Future Standing Exp. Date: 08/01/26     Order Specific Question Answer Comments   Reason for Exam: Right pleural effusion followup    May the Radiologist modify the order per protocol to meet the clinical needs of the patient? Yes    Release to patient Immediate      Basic Metabolic Panel   Standing Status: Future Standing Exp. Date: 11/01/26     Ambulatory referral/consult to Pulmonology   Standing Status: Future   Referral Priority: Routine Referral Type: Consultation   Referral Reason: Specialty Services Required   Requested Specialty: Pulmonary Disease    Number of Visits Requested: 1     Diet Cardiac   Order Comments: Patient to monitor daily weight, follow low salt diet and in case if gain more than 3 pounds in 24 hours, please call your doctor for further advice.    Boost Plus can with meals.     Notify your health care provider if you experience any of the following:  temperature >100.4     Notify your health care provider if you experience any of the following:  persistent nausea and vomiting or diarrhea     Notify your health care provider if you experience any of the following:  severe uncontrolled pain     Notify your health care provider if you experience any of the following:  redness, tenderness, or signs of infection (pain, swelling, redness, odor or green/yellow discharge around incision site)     Notify your health care provider if you experience any of the following:  difficulty breathing or increased cough     Notify your health care provider if you experience any of the following:  severe persistent headache     Notify your health care provider if you experience any of the following:  worsening rash     Notify your health care provider if you experience any of the following:  persistent dizziness, light-headedness, or visual disturbances     Notify your health care provider if you experience any of the following:  increased confusion or weakness     Activity as tolerated   Order Comments: Fall precautions       Significant Diagnostic Studies: Labs: CMP   Recent Labs   Lab 07/31/25  0616 08/01/25  0520    137   K 3.2* 3.4*   CL 99 99   CO2 32* 32*    106   BUN 14 18   CREATININE 0.7 0.7   CALCIUM 8.9 8.7   PROT 6.6 6.3   ALBUMIN 3.2* 3.2*   BILITOT 1.7* 1.3*   ALKPHOS 75 72   AST 27 21   ALT 12 10   ANIONGAP 6* 6*   , CBC   Recent Labs   Lab 07/31/25  0616 08/01/25  0520   WBC 9.38 8.19   HGB 14.1 13.7*   HCT 41.5 40.3   * 123*   , INR   Lab Results   Component Value Date    INR 1.3 (H) 07/26/2025    INR 1.3 (H) 04/29/2025    INR  1.2 04/01/2025    PROTIME 14.2 (H) 07/26/2025    PROTIME 14.0 (H) 04/29/2025    PROTIME 13.0 (H) 04/01/2025   , and Lipid Panel   Lab Results   Component Value Date    CHOL 109 (L) 03/28/2018    HDL 20 (L) 03/28/2018    LDLCALC 73.8 03/28/2018    TRIG 76 03/28/2018    CHOLHDL 18.3 (L) 03/28/2018       Pending Diagnostic Studies:       None           Medications:  Reconciled Home Medications:      Medication List        START taking these medications      apixaban 5 mg Tab  Commonly known as: ELIQUIS  Take 1 tablet (5 mg total) by mouth 2 (two) times daily. Take 2 tablets twice daily for 7 days and then  Take 1 tablet twice daily afterwards.     nicotine 14 mg/24 hr  Commonly known as: NICODERM CQ  Place 1 patch onto the skin once daily.     predniSONE 10 MG tablet  Commonly known as: DELTASONE  Take 1 tablet (10 mg total) by mouth once daily. for 14 days            CHANGE how you take these medications      furosemide 20 MG tablet  Commonly known as: LASIX  Take 3 tablets (60 mg total) by mouth once daily.  What changed: how much to take            CONTINUE taking these medications      albuterol-ipratropium 2.5 mg-0.5 mg/3 mL nebulizer solution  Commonly known as: DUO-NEB  Take 3 mLs by nebulization every 6 (six) hours as needed for Wheezing. Rescue     buprenorphine-naloxone 8-2 8-2 mg Subl  Commonly known as: SUBOXONE  Place 1 tablet under the tongue 2 (two) times daily.     clonazePAM 1 MG tablet  Commonly known as: KlonoPIN  Take 1 mg by mouth 2 (two) times daily as needed for Anxiety.     cloNIDine 0.1 MG tablet  Commonly known as: CATAPRES  Take 0.1 mg by mouth every evening.     FLUoxetine 20 MG capsule  Take 20 mg by mouth once daily.     NARCAN 4 mg/actuation Spry  Generic drug: naloxone  1 spray in 1 nostril as needed for opioid overdose; may repeat 1 spray in alternating nostrils every 2-3 minutes as needed until patient is responsive or EMS arrives     OLANZapine 20 MG tablet  Commonly known as:  ZyPREXA  Take 1 tablet by mouth every evening.     OXcarbazepine 300 MG Tab  Commonly known as: TRILEPTAL  Take 300 mg by mouth 2 (two) times daily.     pantoprazole 40 MG tablet  Commonly known as: PROTONIX  Take 1 tablet (40 mg total) by mouth once daily.              Indwelling Lines/Drains at time of discharge:   Lines/Drains/Airways       None                       Time spent on the discharge of patient: 34 minutes         Oly Santos MD  Department of Hospital Medicine  [facility]

## 2025-08-01 NOTE — PLAN OF CARE
Problem: Adult Inpatient Plan of Care  Goal: Plan of Care Review  Outcome: Progressing  Goal: Patient-Specific Goal (Individualized)  Outcome: Progressing  Goal: Absence of Hospital-Acquired Illness or Injury  Outcome: Progressing  Goal: Optimal Comfort and Wellbeing  Outcome: Progressing     Problem: Constipation  Goal: Effective Bowel Elimination  Outcome: Progressing     Problem: Fall Injury Risk  Goal: Absence of Fall and Fall-Related Injury  Outcome: Progressing

## 2025-08-01 NOTE — SUBJECTIVE & OBJECTIVE
Interval History:  Patient is seen and examined during multidisciplinary rounds.  Patient is currently on 3 L/min supplemental oxygen via nasal cannula.  Complaining of stomach cramp and possible constipation.  Patient just received laxative.  Patient reports improved breathing.  Awaiting pleural fluid analysis results.  Pulmonary Medicine following.    Objective:     Vital Signs (Most Recent):  Temp: 97.9 °F (36.6 °C) (08/01/25 0318)  Pulse: (!) 57 (08/01/25 0318)  Resp: 19 (08/01/25 0318)  BP: 97/62 (08/01/25 0318)  SpO2: (!) 92 % (08/01/25 0318) Vital Signs (24h Range):  Temp:  [97.5 °F (36.4 °C)-98.5 °F (36.9 °C)] 97.9 °F (36.6 °C)  Pulse:  [57-79] 57  Resp:  [17-20] 19  SpO2:  [92 %-97 %] 92 %  BP: ()/(61-74) 97/62     Weight: 81.1 kg (178 lb 12.7 oz)  Body mass index is 27.19 kg/m².    Intake/Output Summary (Last 24 hours) at 8/1/2025 0721  Last data filed at 7/31/2025 1900  Gross per 24 hour   Intake 598 ml   Output 500 ml   Net 98 ml         Physical Exam  Vitals reviewed.   Constitutional:       General: He is not in acute distress.  HENT:      Head: Normocephalic and atraumatic.   Cardiovascular:      Rate and Rhythm: Normal rate and regular rhythm.   Pulmonary:      Effort: Pulmonary effort is normal. No respiratory distress.      Breath sounds: No wheezing, rhonchi or rales.      Comments: Decreased breath sounds right mid to lower lung field and at left base  Skin:     General: Skin is warm and dry.      Comments: Clubbed fingers  Hyperpigmented skin changes distal BLE   Neurological:      General: No focal deficit present.      Mental Status: He is alert and oriented to person, place, and time. Mental status is at baseline.   Psychiatric:         Mood and Affect: Affect normal.         Behavior: Behavior normal.               Significant Labs:   Lab Results   Component Value Date    WBC 8.19 08/01/2025    HGB 13.7 (L) 08/01/2025    HCT 40.3 08/01/2025    MCV 90 08/01/2025     (L)  08/01/2025       CMP  Sodium   Date Value Ref Range Status   08/01/2025 137 136 - 145 mmol/L Final     Potassium   Date Value Ref Range Status   08/01/2025 3.4 (L) 3.5 - 5.1 mmol/L Final     Chloride   Date Value Ref Range Status   08/01/2025 99 95 - 110 mmol/L Final     CO2   Date Value Ref Range Status   08/01/2025 32 (H) 23 - 29 mmol/L Final     Glucose   Date Value Ref Range Status   08/01/2025 106 70 - 110 mg/dL Final     BUN   Date Value Ref Range Status   08/01/2025 18 6 - 20 mg/dL Final     Creatinine   Date Value Ref Range Status   08/01/2025 0.7 0.5 - 1.4 mg/dL Final     Calcium   Date Value Ref Range Status   08/01/2025 8.7 8.7 - 10.5 mg/dL Final     Protein Total   Date Value Ref Range Status   08/01/2025 6.3 6.0 - 8.4 gm/dL Final     Albumin   Date Value Ref Range Status   08/01/2025 3.2 (L) 3.5 - 5.2 g/dL Final     Bilirubin Total   Date Value Ref Range Status   08/01/2025 1.3 (H) 0.1 - 1.0 mg/dL Final     Comment:     For infants and newborns, interpretation of results should be based   on gestational age, weight and in agreement with clinical   observations.    Premature Infant recommended reference ranges:   0-24 hours:  <8.0 mg/dL   24-48 hours: <12.0 mg/dL   3-5 days:    <15.0 mg/dL   6-29 days:   <15.0 mg/dL     ALP   Date Value Ref Range Status   08/01/2025 72 55 - 135 unit/L Final     AST   Date Value Ref Range Status   08/01/2025 21 10 - 40 unit/L Final     ALT   Date Value Ref Range Status   08/01/2025 10 10 - 44 unit/L Final     Anion Gap   Date Value Ref Range Status   08/01/2025 6 (L) 8 - 16 mmol/L Final     eGFR   Date Value Ref Range Status   08/01/2025 >60 >60 mL/min/1.73/m2 Final   09/24/2024 >60 >60 mL/min/1.73 m^2 Final     Microbiology Results (last 7 days)       Procedure Component Value Units Date/Time    Blood culture x two cultures. Draw prior to antibiotics. [[id number]]  (Normal) Collected: 07/26/25 1453    Order Status: Completed Specimen: Blood from Peripheral, Forearm,  Right Updated: 07/31/25 1601     CULTURE, BLOOD ([facility]) No Growth After 5 Days    Blood culture x two cultures. Draw prior to antibiotics. [[id number]]  (Normal) Collected: 07/26/25 1502    Order Status: Completed Specimen: Blood from Peripheral, Hand, Left Updated: 07/31/25 1601     CULTURE, BLOOD ([facility]) No Growth After 5 Days    Culture, Body Fluid (Aerobic) w/ GS [[id number]] Collected: 07/29/25 0915    Order Status: Completed Specimen: Body Fluid from Pleural Fluid Updated: 07/31/25 0907     CULTURE, FLUID No Growth     GRAM STAIN Rare WBC seen      No organisms seen    Influenza A & B by Molecular [[id number]]  (Normal) Collected: 07/26/25 1444    Order Status: Completed Specimen: Nasal Swab Updated: 07/26/25 1534     INFLUENZA A MOLECULAR Negative     INFLUENZA B MOLECULAR  Negative    Group A Strep, Molecular [[id number]]  (Normal) Collected: 07/26/25 1444    Order Status: Completed Specimen: Throat Updated: 07/26/25 1516     Group A Strep Molecular Negative          Significant Imaging:  CXR:  Small bilateral pleural effusions and adjacent atelectasis/consolidation, new/worse from the previous exam.     CTA Chest:  1. No evidence of pulmonary embolism.  2. Large right and small left pleural effusions with areas of linear atelectasis versus scarring involving the lungs bilaterally.    CT abdomen and pelvis with IV contrast:  1. Cirrhosis and portal hypertension with splenomegaly and upper abdominal and paraesophageal varices.  2. Partially occlusive portal vein thrombus is present.  Occlusive splenic vein thrombus and partially occlusive thrombus is present in the superior mesenteric vein.    US abdomen:  Cirrhosis, splenomegaly, and bilateral pleural effusions.     CXR:  Slight interval worsening right greater than left layering pleural effusions and adjacent atelectasis/consolidation.     ECHO:    Left Ventricle: The left ventricle is normal in size. Normal wall thickness. There is normal systolic function  with a visually estimated ejection fraction of 55 - 60%. There is normal diastolic function.    Right Ventricle: The right ventricle is normal in size. Systolic function is normal.    Left Atrium: The left atrium is mildly dilated.    Mitral Valve: There is mild regurgitation.    Tricuspid Valve: There is mild regurgitation. The estimated PA systolic pressure is at least 20 mmHg.  .    IVC/SVC: Normal venous pressure at 3 mmHg.    Pericardium: There is a small effusion adjacent to the right atrium. Pericardial effusion is echolucent. No indication of cardiac tamponade. Left pleural effusion.    US guided right thoracentesis:  After obtaining informed written consent, utilizing ultrasound guidance and local anesthesia (1% lidocaine) routine thoracentesis was performed via a right posterolateral approach utilizing a 6 Czech pigtail type catheter. This yielded 1750 cc of blood-tinged fluid. Following removal of the catheter, a bandage was placed at the entry site. The patient tolerated the procedure well without immediate postprocedure complication and was subsequently transported to the radiography suite for postprocedure imaging.     CXR:  Stable small left pleural effusion with left lower lobe atelectasis   Review of Systems

## 2025-08-01 NOTE — ASSESSMENT & PLAN NOTE
Patient's most recent potassium results are listed below.   Recent Labs     07/30/25  0540 07/31/25  0616 08/01/25  0520   K 3.1* 3.2* 3.4*     Plan  - Replete potassium per protocol  - Monitor potassium Daily  - Patient's hypokalemia is stable    Discussed with bedside nurse and .  Anticoagulation therapy to be resumed.

## 2025-08-01 NOTE — PLAN OF CARE
Pt clear for DC from case management standpoint. Discharging to home Pt's family member will be transport at discharge.     DISCHARGE PENDING MEDICATION DELIVERY       08/01/25 1101   Final Note   Assessment Type Final Discharge Note   Anticipated Discharge Disposition Home

## 2025-08-12 ENCOUNTER — TELEPHONE (OUTPATIENT)
Dept: PULMONOLOGY | Facility: CLINIC | Age: 50
End: 2025-08-12
Payer: MEDICAID

## 2025-08-14 ENCOUNTER — OFFICE VISIT (OUTPATIENT)
Dept: PULMONOLOGY | Facility: CLINIC | Age: 50
End: 2025-08-14
Payer: MEDICAID

## 2025-08-14 ENCOUNTER — HOSPITAL ENCOUNTER (INPATIENT)
Facility: HOSPITAL | Age: 50
LOS: 1 days | Discharge: HOME HEALTH | DRG: 441 | End: 2025-08-16
Attending: STUDENT IN AN ORGANIZED HEALTH CARE EDUCATION/TRAINING PROGRAM | Admitting: FAMILY MEDICINE
Payer: MEDICAID

## 2025-08-14 ENCOUNTER — HOSPITAL ENCOUNTER (OUTPATIENT)
Dept: RADIOLOGY | Facility: HOSPITAL | Age: 50
Discharge: HOME | End: 2025-08-14
Attending: INTERNAL MEDICINE
Payer: MEDICAID

## 2025-08-14 VITALS
OXYGEN SATURATION: 96 % | HEART RATE: 109 BPM | SYSTOLIC BLOOD PRESSURE: 145 MMHG | WEIGHT: 207 LBS | HEIGHT: 68 IN | BODY MASS INDEX: 31.37 KG/M2 | DIASTOLIC BLOOD PRESSURE: 79 MMHG

## 2025-08-14 DIAGNOSIS — K74.60 HEPATIC CIRRHOSIS, UNSPECIFIED HEPATIC CIRRHOSIS TYPE, UNSPECIFIED WHETHER ASCITES PRESENT: ICD-10-CM

## 2025-08-14 DIAGNOSIS — J22 LOWER RESPIRATORY INFECTION: ICD-10-CM

## 2025-08-14 DIAGNOSIS — R07.9 CHEST PAIN: ICD-10-CM

## 2025-08-14 DIAGNOSIS — G93.40 ACUTE ENCEPHALOPATHY: Primary | ICD-10-CM

## 2025-08-14 DIAGNOSIS — J90 RECURRENT RIGHT PLEURAL EFFUSION: ICD-10-CM

## 2025-08-14 DIAGNOSIS — R41.82 ALTERED MENTAL STATUS: ICD-10-CM

## 2025-08-14 DIAGNOSIS — I81 PORTAL VEIN THROMBOSIS: ICD-10-CM

## 2025-08-14 DIAGNOSIS — K76.82 HEPATIC ENCEPHALOPATHY: ICD-10-CM

## 2025-08-14 DIAGNOSIS — F31.9 BIPOLAR AFFECTIVE DISORDER, REMISSION STATUS UNSPECIFIED: Primary | ICD-10-CM

## 2025-08-14 DIAGNOSIS — F17.200 TOBACCO DEPENDENCY: ICD-10-CM

## 2025-08-14 DIAGNOSIS — J44.9 CHRONIC OBSTRUCTIVE PULMONARY DISEASE, UNSPECIFIED COPD TYPE: ICD-10-CM

## 2025-08-14 DIAGNOSIS — M79.89 LEG SWELLING: ICD-10-CM

## 2025-08-14 DIAGNOSIS — I82.890 SPLENIC VEIN THROMBOSIS: ICD-10-CM

## 2025-08-14 PROBLEM — J96.00 ACUTE RESPIRATORY FAILURE: Status: RESOLVED | Noted: 2018-03-28 | Resolved: 2025-08-14

## 2025-08-14 PROBLEM — R60.0 LOWER EXTREMITY EDEMA: Status: ACTIVE | Noted: 2025-08-14

## 2025-08-14 PROBLEM — J43.8 OTHER EMPHYSEMA: Status: ACTIVE | Noted: 2025-07-27

## 2025-08-14 PROBLEM — G93.41 ACUTE METABOLIC ENCEPHALOPATHY: Status: ACTIVE | Noted: 2025-08-14

## 2025-08-14 LAB
ABSOLUTE EOSINOPHIL (SMH): 0.4 K/UL
ABSOLUTE MONOCYTE (SMH): 1.42 K/UL (ref 0.3–1)
ABSOLUTE NEUTROPHIL COUNT (SMH): 6.9 K/UL (ref 1.8–7.7)
ALBUMIN SERPL-MCNC: 3.6 G/DL (ref 3.5–5.2)
ALLENS TEST: ABNORMAL
ALP SERPL-CCNC: 109 UNIT/L (ref 55–135)
ALT SERPL-CCNC: 21 UNIT/L (ref 10–44)
AMMONIA PLAS-SCNC: 74 UMOL/L (ref 10–50)
ANION GAP (SMH): 9 MMOL/L (ref 8–16)
AST SERPL-CCNC: 39 UNIT/L (ref 10–40)
BASOPHILS # BLD AUTO: 0.07 K/UL
BASOPHILS NFR BLD AUTO: 0.6 %
BILIRUB SERPL-MCNC: 2.5 MG/DL (ref 0.1–1)
BNP SERPL-MCNC: 23 PG/ML
BUN SERPL-MCNC: 13 MG/DL (ref 6–20)
CALCIUM SERPL-MCNC: 9.3 MG/DL (ref 8.7–10.5)
CHLORIDE SERPL-SCNC: 100 MMOL/L (ref 95–110)
CO2 SERPL-SCNC: 26 MMOL/L (ref 23–29)
CREAT SERPL-MCNC: 1 MG/DL (ref 0.5–1.4)
DELSYS: ABNORMAL
ERYTHROCYTE [DISTWIDTH] IN BLOOD BY AUTOMATED COUNT: 15.1 % (ref 11.5–14.5)
GFR SERPLBLD CREATININE-BSD FMLA CKD-EPI: >60 ML/MIN/1.73/M2
GLUCOSE SERPL-MCNC: 113 MG/DL (ref 70–110)
HCO3 UR-SCNC: 28.2 MMOL/L (ref 24–28)
HCT VFR BLD AUTO: 38 % (ref 40–54)
HGB BLD-MCNC: 13.3 GM/DL (ref 14–18)
IMM GRANULOCYTES # BLD AUTO: 0.04 K/UL (ref 0–0.04)
IMM GRANULOCYTES NFR BLD AUTO: 0.4 % (ref 0–0.5)
LDH SERPL L TO P-CCNC: 1.74 MMOL/L (ref 0.5–2.2)
LYMPHOCYTES # BLD AUTO: 2.27 K/UL (ref 1–4.8)
MCH RBC QN AUTO: 31.2 PG (ref 27–31)
MCHC RBC AUTO-ENTMCNC: 35 G/DL (ref 32–36)
MCV RBC AUTO: 89 FL (ref 82–98)
MODE: ABNORMAL
NUCLEATED RBC (/100WBC) (SMH): 0 /100 WBC
OHS QRS DURATION: 82 MS
OHS QTC CALCULATION: 490 MS
PCO2 BLDA: 37 MMHG (ref 35–45)
PH SMN: 7.49 [PH] (ref 7.35–7.45)
PLATELET # BLD AUTO: 123 K/UL (ref 150–450)
PMV BLD AUTO: 10.2 FL (ref 9.2–12.9)
PO2 BLDA: 42 MMHG (ref 40–60)
POC BE: 5 MMOL/L (ref -2–2)
POC SATURATED O2: 82 % (ref 95–100)
POC TCO2: 29 MMOL/L (ref 24–29)
POTASSIUM SERPL-SCNC: 3.4 MMOL/L (ref 3.5–5.1)
PROT SERPL-MCNC: 7.1 GM/DL (ref 6–8.4)
RBC # BLD AUTO: 4.26 M/UL (ref 4.6–6.2)
RELATIVE EOSINOPHIL (SMH): 3.6 % (ref 0–8)
RELATIVE LYMPHOCYTE (SMH): 20.5 % (ref 18–48)
RELATIVE MONOCYTE (SMH): 12.8 % (ref 4–15)
RELATIVE NEUTROPHIL (SMH): 62.1 % (ref 38–73)
SAMPLE: ABNORMAL
SAMPLE: NORMAL
SITE: ABNORMAL
SODIUM SERPL-SCNC: 135 MMOL/L (ref 136–145)
TROPONIN HIGH SENSITIVE (SMH): 4.3 PG/ML
WBC # BLD AUTO: 11.08 K/UL (ref 3.9–12.7)

## 2025-08-14 PROCEDURE — 80053 COMPREHEN METABOLIC PANEL: CPT | Performed by: PHYSICIAN ASSISTANT

## 2025-08-14 PROCEDURE — 25500020 PHARM REV CODE 255: Performed by: STUDENT IN AN ORGANIZED HEALTH CARE EDUCATION/TRAINING PROGRAM

## 2025-08-14 PROCEDURE — 93005 ELECTROCARDIOGRAM TRACING: CPT | Performed by: INTERNAL MEDICINE

## 2025-08-14 PROCEDURE — 82140 ASSAY OF AMMONIA: CPT | Performed by: STUDENT IN AN ORGANIZED HEALTH CARE EDUCATION/TRAINING PROGRAM

## 2025-08-14 PROCEDURE — 63600175 PHARM REV CODE 636 W HCPCS: Performed by: STUDENT IN AN ORGANIZED HEALTH CARE EDUCATION/TRAINING PROGRAM

## 2025-08-14 PROCEDURE — 63600175 PHARM REV CODE 636 W HCPCS: Performed by: HOSPITALIST

## 2025-08-14 PROCEDURE — G0378 HOSPITAL OBSERVATION PER HR: HCPCS

## 2025-08-14 PROCEDURE — 99900035 HC TECH TIME PER 15 MIN (STAT)

## 2025-08-14 PROCEDURE — 85025 COMPLETE CBC W/AUTO DIFF WBC: CPT | Performed by: PHYSICIAN ASSISTANT

## 2025-08-14 PROCEDURE — 83880 ASSAY OF NATRIURETIC PEPTIDE: CPT | Performed by: PHYSICIAN ASSISTANT

## 2025-08-14 PROCEDURE — 99999 PR PBB SHADOW E&M-EST. PATIENT-LVL III: CPT | Mod: PBBFAC,,, | Performed by: INTERNAL MEDICINE

## 2025-08-14 PROCEDURE — 99285 EMERGENCY DEPT VISIT HI MDM: CPT | Mod: 25

## 2025-08-14 PROCEDURE — 93010 ELECTROCARDIOGRAM REPORT: CPT | Mod: ,,, | Performed by: INTERNAL MEDICINE

## 2025-08-14 PROCEDURE — 99213 OFFICE O/P EST LOW 20 MIN: CPT | Mod: PBBFAC,PN | Performed by: INTERNAL MEDICINE

## 2025-08-14 PROCEDURE — 84484 ASSAY OF TROPONIN QUANT: CPT | Performed by: PHYSICIAN ASSISTANT

## 2025-08-14 PROCEDURE — 82803 BLOOD GASES ANY COMBINATION: CPT

## 2025-08-14 PROCEDURE — 25000003 PHARM REV CODE 250: Performed by: STUDENT IN AN ORGANIZED HEALTH CARE EDUCATION/TRAINING PROGRAM

## 2025-08-14 RX ORDER — SODIUM CHLORIDE 0.9 % (FLUSH) 0.9 %
10 SYRINGE (ML) INJECTION EVERY 12 HOURS PRN
Status: DISCONTINUED | OUTPATIENT
Start: 2025-08-14 | End: 2025-08-16 | Stop reason: HOSPADM

## 2025-08-14 RX ORDER — IBUPROFEN 200 MG
24 TABLET ORAL
Status: DISCONTINUED | OUTPATIENT
Start: 2025-08-14 | End: 2025-08-16 | Stop reason: HOSPADM

## 2025-08-14 RX ORDER — IBUPROFEN 200 MG
16 TABLET ORAL
Status: DISCONTINUED | OUTPATIENT
Start: 2025-08-14 | End: 2025-08-16 | Stop reason: HOSPADM

## 2025-08-14 RX ORDER — ONDANSETRON HYDROCHLORIDE 2 MG/ML
4 INJECTION, SOLUTION INTRAVENOUS EVERY 8 HOURS PRN
Status: DISCONTINUED | OUTPATIENT
Start: 2025-08-14 | End: 2025-08-16 | Stop reason: HOSPADM

## 2025-08-14 RX ORDER — LANOLIN ALCOHOL/MO/W.PET/CERES
800 CREAM (GRAM) TOPICAL
Status: DISCONTINUED | OUTPATIENT
Start: 2025-08-14 | End: 2025-08-16 | Stop reason: HOSPADM

## 2025-08-14 RX ORDER — SODIUM,POTASSIUM PHOSPHATES 280-250MG
2 POWDER IN PACKET (EA) ORAL
Status: DISCONTINUED | OUTPATIENT
Start: 2025-08-14 | End: 2025-08-16 | Stop reason: HOSPADM

## 2025-08-14 RX ORDER — GLUCAGON 1 MG
1 KIT INJECTION
Status: DISCONTINUED | OUTPATIENT
Start: 2025-08-14 | End: 2025-08-16 | Stop reason: HOSPADM

## 2025-08-14 RX ORDER — FUROSEMIDE 10 MG/ML
20 INJECTION INTRAMUSCULAR; INTRAVENOUS
Status: COMPLETED | OUTPATIENT
Start: 2025-08-14 | End: 2025-08-14

## 2025-08-14 RX ORDER — NALOXONE HCL 0.4 MG/ML
0.02 VIAL (ML) INJECTION
Status: DISCONTINUED | OUTPATIENT
Start: 2025-08-14 | End: 2025-08-16 | Stop reason: HOSPADM

## 2025-08-14 RX ORDER — BUPRENORPHINE AND NALOXONE 8; 2 MG/1; MG/1
1 FILM, SOLUBLE BUCCAL; SUBLINGUAL DAILY
Status: DISCONTINUED | OUTPATIENT
Start: 2025-08-14 | End: 2025-08-15

## 2025-08-14 RX ADMIN — POTASSIUM BICARBONATE 50 MEQ: 977.5 TABLET, EFFERVESCENT ORAL at 09:08

## 2025-08-14 RX ADMIN — FUROSEMIDE 20 MG: 10 INJECTION, SOLUTION INTRAMUSCULAR; INTRAVENOUS at 09:08

## 2025-08-14 RX ADMIN — LACTULOSE 30 G: 10 SOLUTION ORAL at 09:08

## 2025-08-14 RX ADMIN — BUPRENORPHINE AND NALOXONE 1 FILM: 8; 2 FILM BUCCAL; SUBLINGUAL at 09:08

## 2025-08-14 RX ADMIN — IOHEXOL 100 ML: 350 INJECTION, SOLUTION INTRAVENOUS at 06:08

## 2025-08-14 RX ADMIN — ONDANSETRON 4 MG: 2 INJECTION INTRAMUSCULAR; INTRAVENOUS at 09:08

## 2025-08-15 LAB
ABSOLUTE EOSINOPHIL (SMH): 0.28 K/UL
ABSOLUTE MONOCYTE (SMH): 0.93 K/UL (ref 0.3–1)
ABSOLUTE NEUTROPHIL COUNT (SMH): 3.6 K/UL (ref 1.8–7.7)
ALBUMIN SERPL-MCNC: 3.1 G/DL (ref 3.5–5.2)
ALP SERPL-CCNC: 87 UNIT/L (ref 55–135)
ALT SERPL-CCNC: 16 UNIT/L (ref 10–44)
AMMONIA PLAS-SCNC: 83 UMOL/L (ref 10–50)
ANION GAP (SMH): 5 MMOL/L (ref 8–16)
AST SERPL-CCNC: 29 UNIT/L (ref 10–40)
BASOPHILS # BLD AUTO: 0.06 K/UL
BASOPHILS NFR BLD AUTO: 0.9 %
BILIRUB SERPL-MCNC: 4.3 MG/DL (ref 0.1–1)
BUN SERPL-MCNC: 13 MG/DL (ref 6–20)
CALCIUM SERPL-MCNC: 8.9 MG/DL (ref 8.7–10.5)
CHLORIDE SERPL-SCNC: 104 MMOL/L (ref 95–110)
CO2 SERPL-SCNC: 28 MMOL/L (ref 23–29)
CREAT SERPL-MCNC: 0.9 MG/DL (ref 0.5–1.4)
ERYTHROCYTE [DISTWIDTH] IN BLOOD BY AUTOMATED COUNT: 15.1 % (ref 11.5–14.5)
GFR SERPLBLD CREATININE-BSD FMLA CKD-EPI: >60 ML/MIN/1.73/M2
GLUCOSE SERPL-MCNC: 97 MG/DL (ref 70–110)
HCT VFR BLD AUTO: 35.8 % (ref 40–54)
HGB BLD-MCNC: 12.2 GM/DL (ref 14–18)
IMM GRANULOCYTES # BLD AUTO: 0.01 K/UL (ref 0–0.04)
IMM GRANULOCYTES NFR BLD AUTO: 0.2 % (ref 0–0.5)
LYMPHOCYTES # BLD AUTO: 1.46 K/UL (ref 1–4.8)
MAGNESIUM SERPL-MCNC: 1.9 MG/DL (ref 1.6–2.6)
MCH RBC QN AUTO: 31.9 PG (ref 27–31)
MCHC RBC AUTO-ENTMCNC: 34.1 G/DL (ref 32–36)
MCV RBC AUTO: 94 FL (ref 82–98)
NUCLEATED RBC (/100WBC) (SMH): 0 /100 WBC
PHOSPHATE SERPL-MCNC: 3.8 MG/DL (ref 2.7–4.5)
PLATELET # BLD AUTO: 89 K/UL (ref 150–450)
PLATELET BLD QL SMEAR: ABNORMAL
PMV BLD AUTO: 10.2 FL (ref 9.2–12.9)
POTASSIUM SERPL-SCNC: 3.6 MMOL/L (ref 3.5–5.1)
PROT SERPL-MCNC: 6.1 GM/DL (ref 6–8.4)
RBC # BLD AUTO: 3.83 M/UL (ref 4.6–6.2)
RELATIVE EOSINOPHIL (SMH): 4.4 % (ref 0–8)
RELATIVE LYMPHOCYTE (SMH): 23 % (ref 18–48)
RELATIVE MONOCYTE (SMH): 14.6 % (ref 4–15)
RELATIVE NEUTROPHIL (SMH): 56.9 % (ref 38–73)
SODIUM SERPL-SCNC: 137 MMOL/L (ref 136–145)
WBC # BLD AUTO: 6.35 K/UL (ref 3.9–12.7)

## 2025-08-15 PROCEDURE — 63600175 PHARM REV CODE 636 W HCPCS: Performed by: HOSPITALIST

## 2025-08-15 PROCEDURE — 94761 N-INVAS EAR/PLS OXIMETRY MLT: CPT

## 2025-08-15 PROCEDURE — 85025 COMPLETE CBC W/AUTO DIFF WBC: CPT | Performed by: HOSPITALIST

## 2025-08-15 PROCEDURE — 11000001 HC ACUTE MED/SURG PRIVATE ROOM

## 2025-08-15 PROCEDURE — 99900031 HC PATIENT EDUCATION (STAT)

## 2025-08-15 PROCEDURE — 36415 COLL VENOUS BLD VENIPUNCTURE: CPT | Performed by: FAMILY MEDICINE

## 2025-08-15 PROCEDURE — 83735 ASSAY OF MAGNESIUM: CPT | Performed by: HOSPITALIST

## 2025-08-15 PROCEDURE — 94799 UNLISTED PULMONARY SVC/PX: CPT

## 2025-08-15 PROCEDURE — 25000003 PHARM REV CODE 250: Performed by: HOSPITALIST

## 2025-08-15 PROCEDURE — 36415 COLL VENOUS BLD VENIPUNCTURE: CPT | Performed by: HOSPITALIST

## 2025-08-15 PROCEDURE — 84100 ASSAY OF PHOSPHORUS: CPT | Performed by: HOSPITALIST

## 2025-08-15 PROCEDURE — 99900035 HC TECH TIME PER 15 MIN (STAT)

## 2025-08-15 PROCEDURE — 25000003 PHARM REV CODE 250: Performed by: FAMILY MEDICINE

## 2025-08-15 PROCEDURE — 99406 BEHAV CHNG SMOKING 3-10 MIN: CPT

## 2025-08-15 PROCEDURE — 80053 COMPREHEN METABOLIC PANEL: CPT | Performed by: HOSPITALIST

## 2025-08-15 PROCEDURE — 82140 ASSAY OF AMMONIA: CPT | Performed by: FAMILY MEDICINE

## 2025-08-15 PROCEDURE — S4991 NICOTINE PATCH NONLEGEND: HCPCS | Performed by: HOSPITALIST

## 2025-08-15 PROCEDURE — 63600175 PHARM REV CODE 636 W HCPCS: Performed by: FAMILY MEDICINE

## 2025-08-15 RX ORDER — CLONIDINE HYDROCHLORIDE 0.1 MG/1
0.1 TABLET ORAL NIGHTLY
Status: DISCONTINUED | OUTPATIENT
Start: 2025-08-15 | End: 2025-08-16 | Stop reason: HOSPADM

## 2025-08-15 RX ORDER — CLONAZEPAM 1 MG/1
1 TABLET ORAL 2 TIMES DAILY PRN
Status: DISCONTINUED | OUTPATIENT
Start: 2025-08-15 | End: 2025-08-16 | Stop reason: HOSPADM

## 2025-08-15 RX ORDER — FUROSEMIDE 10 MG/ML
60 INJECTION INTRAMUSCULAR; INTRAVENOUS EVERY 12 HOURS
Status: DISCONTINUED | OUTPATIENT
Start: 2025-08-15 | End: 2025-08-15

## 2025-08-15 RX ORDER — FUROSEMIDE 10 MG/ML
40 INJECTION INTRAMUSCULAR; INTRAVENOUS EVERY 12 HOURS
Status: DISCONTINUED | OUTPATIENT
Start: 2025-08-15 | End: 2025-08-15

## 2025-08-15 RX ORDER — IPRATROPIUM BROMIDE AND ALBUTEROL SULFATE 2.5; .5 MG/3ML; MG/3ML
3 SOLUTION RESPIRATORY (INHALATION) EVERY 6 HOURS PRN
Status: DISCONTINUED | OUTPATIENT
Start: 2025-08-15 | End: 2025-08-16 | Stop reason: HOSPADM

## 2025-08-15 RX ORDER — SPIRONOLACTONE 50 MG/1
100 TABLET, FILM COATED ORAL DAILY
Status: DISCONTINUED | OUTPATIENT
Start: 2025-08-15 | End: 2025-08-16 | Stop reason: HOSPADM

## 2025-08-15 RX ORDER — FLUOXETINE 20 MG/1
20 CAPSULE ORAL DAILY
Status: DISCONTINUED | OUTPATIENT
Start: 2025-08-15 | End: 2025-08-16 | Stop reason: HOSPADM

## 2025-08-15 RX ORDER — POTASSIUM CHLORIDE 20 MEQ/1
40 TABLET, EXTENDED RELEASE ORAL ONCE
Status: COMPLETED | OUTPATIENT
Start: 2025-08-15 | End: 2025-08-15

## 2025-08-15 RX ORDER — IBUPROFEN 200 MG
1 TABLET ORAL DAILY
Status: DISCONTINUED | OUTPATIENT
Start: 2025-08-15 | End: 2025-08-16 | Stop reason: HOSPADM

## 2025-08-15 RX ORDER — FUROSEMIDE 10 MG/ML
40 INJECTION INTRAMUSCULAR; INTRAVENOUS EVERY 12 HOURS
Status: DISCONTINUED | OUTPATIENT
Start: 2025-08-15 | End: 2025-08-16

## 2025-08-15 RX ORDER — OLANZAPINE 5 MG/1
20 TABLET, FILM COATED ORAL NIGHTLY
Status: DISCONTINUED | OUTPATIENT
Start: 2025-08-15 | End: 2025-08-16 | Stop reason: HOSPADM

## 2025-08-15 RX ORDER — OXCARBAZEPINE 150 MG/1
300 TABLET, FILM COATED ORAL 2 TIMES DAILY
Status: DISCONTINUED | OUTPATIENT
Start: 2025-08-15 | End: 2025-08-16 | Stop reason: HOSPADM

## 2025-08-15 RX ORDER — PANTOPRAZOLE SODIUM 40 MG/1
40 TABLET, DELAYED RELEASE ORAL DAILY
Status: DISCONTINUED | OUTPATIENT
Start: 2025-08-15 | End: 2025-08-16 | Stop reason: HOSPADM

## 2025-08-15 RX ORDER — BUPRENORPHINE AND NALOXONE 8; 2 MG/1; MG/1
1 FILM, SOLUBLE BUCCAL; SUBLINGUAL 2 TIMES DAILY
Status: DISCONTINUED | OUTPATIENT
Start: 2025-08-15 | End: 2025-08-16 | Stop reason: HOSPADM

## 2025-08-15 RX ADMIN — LACTULOSE 20 G: 10 SOLUTION ORAL at 12:08

## 2025-08-15 RX ADMIN — RIFAXIMIN 550 MG: 550 TABLET ORAL at 09:08

## 2025-08-15 RX ADMIN — CLONIDINE HYDROCHLORIDE 0.1 MG: 0.1 TABLET ORAL at 09:08

## 2025-08-15 RX ADMIN — NICOTINE 1 PATCH: 14 PATCH, EXTENDED RELEASE TRANSDERMAL at 07:08

## 2025-08-15 RX ADMIN — LACTULOSE 20 G: 10 SOLUTION ORAL at 11:08

## 2025-08-15 RX ADMIN — FLUOXETINE HYDROCHLORIDE 20 MG: 20 CAPSULE ORAL at 07:08

## 2025-08-15 RX ADMIN — OXCARBAZEPINE 300 MG: 150 TABLET, FILM COATED ORAL at 09:08

## 2025-08-15 RX ADMIN — LACTULOSE 20 G: 10 SOLUTION ORAL at 07:08

## 2025-08-15 RX ADMIN — FUROSEMIDE 40 MG: 10 INJECTION, SOLUTION INTRAMUSCULAR; INTRAVENOUS at 09:08

## 2025-08-15 RX ADMIN — APIXABAN 5 MG: 5 TABLET, FILM COATED ORAL at 09:08

## 2025-08-15 RX ADMIN — LACTULOSE 20 G: 10 SOLUTION ORAL at 09:08

## 2025-08-15 RX ADMIN — LACTULOSE 20 G: 10 SOLUTION ORAL at 04:08

## 2025-08-15 RX ADMIN — PANTOPRAZOLE SODIUM 40 MG: 40 TABLET, DELAYED RELEASE ORAL at 07:08

## 2025-08-15 RX ADMIN — SPIRONOLACTONE 100 MG: 50 TABLET, FILM COATED ORAL at 07:08

## 2025-08-15 RX ADMIN — POTASSIUM CHLORIDE 40 MEQ: 1500 TABLET, EXTENDED RELEASE ORAL at 05:08

## 2025-08-15 RX ADMIN — RIFAXIMIN 550 MG: 550 TABLET ORAL at 07:08

## 2025-08-15 RX ADMIN — BUPRENORPHINE AND NALOXONE 1 FILM: 8; 2 FILM BUCCAL; SUBLINGUAL at 07:08

## 2025-08-15 RX ADMIN — APIXABAN 5 MG: 5 TABLET, FILM COATED ORAL at 07:08

## 2025-08-15 RX ADMIN — LACTULOSE 20 G: 10 SOLUTION ORAL at 05:08

## 2025-08-15 RX ADMIN — OLANZAPINE 20 MG: 5 TABLET, FILM COATED ORAL at 09:08

## 2025-08-15 RX ADMIN — BUPRENORPHINE AND NALOXONE 1 FILM: 8; 2 FILM BUCCAL; SUBLINGUAL at 09:08

## 2025-08-15 RX ADMIN — OXCARBAZEPINE 300 MG: 150 TABLET, FILM COATED ORAL at 07:08

## 2025-08-16 VITALS
HEIGHT: 69 IN | HEART RATE: 84 BPM | BODY MASS INDEX: 30.76 KG/M2 | TEMPERATURE: 98 F | OXYGEN SATURATION: 96 % | DIASTOLIC BLOOD PRESSURE: 60 MMHG | RESPIRATION RATE: 17 BRPM | SYSTOLIC BLOOD PRESSURE: 93 MMHG | WEIGHT: 207.69 LBS

## 2025-08-16 PROBLEM — G93.41 ACUTE METABOLIC ENCEPHALOPATHY: Status: RESOLVED | Noted: 2025-08-14 | Resolved: 2025-08-16

## 2025-08-16 PROBLEM — K76.82 HEPATIC ENCEPHALOPATHY: Status: RESOLVED | Noted: 2025-08-14 | Resolved: 2025-08-16

## 2025-08-16 PROBLEM — E87.6 HYPOKALEMIA: Status: RESOLVED | Noted: 2025-07-27 | Resolved: 2025-08-16

## 2025-08-16 LAB
ABSOLUTE EOSINOPHIL (SMH): 0.36 K/UL
ABSOLUTE MONOCYTE (SMH): 0.77 K/UL (ref 0.3–1)
ABSOLUTE NEUTROPHIL COUNT (SMH): 3.1 K/UL (ref 1.8–7.7)
ALBUMIN SERPL-MCNC: 2.9 G/DL (ref 3.5–5.2)
ALP SERPL-CCNC: 85 UNIT/L (ref 55–135)
ALT SERPL-CCNC: 15 UNIT/L (ref 10–44)
AMMONIA PLAS-SCNC: 58 UMOL/L (ref 10–50)
ANION GAP (SMH): 6 MMOL/L (ref 8–16)
AST SERPL-CCNC: 26 UNIT/L (ref 10–40)
BASOPHILS # BLD AUTO: 0.05 K/UL
BASOPHILS NFR BLD AUTO: 0.9 %
BILIRUB SERPL-MCNC: 3.6 MG/DL (ref 0.1–1)
BUN SERPL-MCNC: 13 MG/DL (ref 6–20)
CALCIUM SERPL-MCNC: 8.4 MG/DL (ref 8.7–10.5)
CHLORIDE SERPL-SCNC: 104 MMOL/L (ref 95–110)
CO2 SERPL-SCNC: 27 MMOL/L (ref 23–29)
CREAT SERPL-MCNC: 0.9 MG/DL (ref 0.5–1.4)
ERYTHROCYTE [DISTWIDTH] IN BLOOD BY AUTOMATED COUNT: 14.9 % (ref 11.5–14.5)
GFR SERPLBLD CREATININE-BSD FMLA CKD-EPI: >60 ML/MIN/1.73/M2
GLUCOSE SERPL-MCNC: 97 MG/DL (ref 70–110)
HCT VFR BLD AUTO: 33.2 % (ref 40–54)
HGB BLD-MCNC: 11.4 GM/DL (ref 14–18)
IMM GRANULOCYTES # BLD AUTO: 0.02 K/UL (ref 0–0.04)
IMM GRANULOCYTES NFR BLD AUTO: 0.4 % (ref 0–0.5)
LYMPHOCYTES # BLD AUTO: 1.29 K/UL (ref 1–4.8)
MAGNESIUM SERPL-MCNC: 1.8 MG/DL (ref 1.6–2.6)
MCH RBC QN AUTO: 31.5 PG (ref 27–31)
MCHC RBC AUTO-ENTMCNC: 34.3 G/DL (ref 32–36)
MCV RBC AUTO: 92 FL (ref 82–98)
NUCLEATED RBC (/100WBC) (SMH): 0 /100 WBC
PHOSPHATE SERPL-MCNC: 3.7 MG/DL (ref 2.7–4.5)
PLATELET # BLD AUTO: 85 K/UL (ref 150–450)
PMV BLD AUTO: 10.3 FL (ref 9.2–12.9)
POTASSIUM SERPL-SCNC: 3.3 MMOL/L (ref 3.5–5.1)
PROT SERPL-MCNC: 5.6 GM/DL (ref 6–8.4)
RBC # BLD AUTO: 3.62 M/UL (ref 4.6–6.2)
RELATIVE EOSINOPHIL (SMH): 6.4 % (ref 0–8)
RELATIVE LYMPHOCYTE (SMH): 23 % (ref 18–48)
RELATIVE MONOCYTE (SMH): 13.8 % (ref 4–15)
RELATIVE NEUTROPHIL (SMH): 55.5 % (ref 38–73)
SODIUM SERPL-SCNC: 137 MMOL/L (ref 136–145)
WBC # BLD AUTO: 5.6 K/UL (ref 3.9–12.7)

## 2025-08-16 PROCEDURE — 84100 ASSAY OF PHOSPHORUS: CPT | Performed by: HOSPITALIST

## 2025-08-16 PROCEDURE — 85025 COMPLETE CBC W/AUTO DIFF WBC: CPT | Performed by: HOSPITALIST

## 2025-08-16 PROCEDURE — 94761 N-INVAS EAR/PLS OXIMETRY MLT: CPT

## 2025-08-16 PROCEDURE — S4991 NICOTINE PATCH NONLEGEND: HCPCS | Performed by: HOSPITALIST

## 2025-08-16 PROCEDURE — 82040 ASSAY OF SERUM ALBUMIN: CPT | Performed by: HOSPITALIST

## 2025-08-16 PROCEDURE — 25000003 PHARM REV CODE 250: Performed by: FAMILY MEDICINE

## 2025-08-16 PROCEDURE — 83735 ASSAY OF MAGNESIUM: CPT | Performed by: HOSPITALIST

## 2025-08-16 PROCEDURE — 82140 ASSAY OF AMMONIA: CPT | Performed by: FAMILY MEDICINE

## 2025-08-16 PROCEDURE — 63600175 PHARM REV CODE 636 W HCPCS: Performed by: HOSPITALIST

## 2025-08-16 PROCEDURE — 99900031 HC PATIENT EDUCATION (STAT)

## 2025-08-16 PROCEDURE — 36415 COLL VENOUS BLD VENIPUNCTURE: CPT | Performed by: HOSPITALIST

## 2025-08-16 PROCEDURE — 99900035 HC TECH TIME PER 15 MIN (STAT)

## 2025-08-16 PROCEDURE — 25000003 PHARM REV CODE 250: Performed by: HOSPITALIST

## 2025-08-16 PROCEDURE — 36415 COLL VENOUS BLD VENIPUNCTURE: CPT | Performed by: FAMILY MEDICINE

## 2025-08-16 RX ORDER — FUROSEMIDE 40 MG/1
40 TABLET ORAL DAILY
Qty: 30 TABLET | Refills: 2 | Status: SHIPPED | OUTPATIENT
Start: 2025-08-17

## 2025-08-16 RX ORDER — FUROSEMIDE 40 MG/1
40 TABLET ORAL DAILY
Status: DISCONTINUED | OUTPATIENT
Start: 2025-08-16 | End: 2025-08-16 | Stop reason: HOSPADM

## 2025-08-16 RX ORDER — SPIRONOLACTONE 100 MG/1
100 TABLET, FILM COATED ORAL DAILY
Qty: 30 TABLET | Refills: 2 | Status: SHIPPED | OUTPATIENT
Start: 2025-08-17

## 2025-08-16 RX ADMIN — FUROSEMIDE 40 MG: 40 TABLET ORAL at 09:08

## 2025-08-16 RX ADMIN — FLUOXETINE HYDROCHLORIDE 20 MG: 20 CAPSULE ORAL at 09:08

## 2025-08-16 RX ADMIN — NICOTINE 1 PATCH: 14 PATCH, EXTENDED RELEASE TRANSDERMAL at 09:08

## 2025-08-16 RX ADMIN — APIXABAN 5 MG: 5 TABLET, FILM COATED ORAL at 09:08

## 2025-08-16 RX ADMIN — OXCARBAZEPINE 300 MG: 150 TABLET, FILM COATED ORAL at 09:08

## 2025-08-16 RX ADMIN — PANTOPRAZOLE SODIUM 40 MG: 40 TABLET, DELAYED RELEASE ORAL at 09:08

## 2025-08-16 RX ADMIN — Medication 800 MG: at 06:08

## 2025-08-16 RX ADMIN — LACTULOSE 20 G: 10 SOLUTION ORAL at 02:08

## 2025-08-16 RX ADMIN — Medication 800 MG: at 09:08

## 2025-08-16 RX ADMIN — LACTULOSE 20 G: 10 SOLUTION ORAL at 09:08

## 2025-08-16 RX ADMIN — POTASSIUM BICARBONATE 35 MEQ: 391 TABLET, EFFERVESCENT ORAL at 06:08

## 2025-08-16 RX ADMIN — POTASSIUM BICARBONATE 35 MEQ: 391 TABLET, EFFERVESCENT ORAL at 09:08

## 2025-08-16 RX ADMIN — RIFAXIMIN 550 MG: 550 TABLET ORAL at 09:08

## 2025-08-16 RX ADMIN — SPIRONOLACTONE 100 MG: 50 TABLET, FILM COATED ORAL at 09:08

## 2025-08-16 RX ADMIN — BUPRENORPHINE AND NALOXONE 1 FILM: 8; 2 FILM BUCCAL; SUBLINGUAL at 09:08

## 2025-08-18 ENCOUNTER — TELEPHONE (OUTPATIENT)
Dept: HEPATOLOGY | Facility: CLINIC | Age: 50
End: 2025-08-18
Payer: MEDICAID

## 2025-08-18 ENCOUNTER — TELEPHONE (OUTPATIENT)
Dept: HOME HEALTH SERVICES | Facility: CLINIC | Age: 50
End: 2025-08-18
Payer: MEDICAID

## 2025-08-18 DIAGNOSIS — K74.60 HEPATIC CIRRHOSIS, UNSPECIFIED HEPATIC CIRRHOSIS TYPE, UNSPECIFIED WHETHER ASCITES PRESENT: Primary | ICD-10-CM

## 2025-08-19 ENCOUNTER — OFFICE VISIT (OUTPATIENT)
Dept: HOME HEALTH SERVICES | Facility: CLINIC | Age: 50
End: 2025-08-19
Payer: MEDICAID

## 2025-08-19 VITALS
SYSTOLIC BLOOD PRESSURE: 118 MMHG | BODY MASS INDEX: 30.04 KG/M2 | HEIGHT: 69 IN | RESPIRATION RATE: 20 BRPM | TEMPERATURE: 98 F | HEART RATE: 87 BPM | WEIGHT: 202.81 LBS | OXYGEN SATURATION: 98 % | DIASTOLIC BLOOD PRESSURE: 72 MMHG

## 2025-08-19 DIAGNOSIS — K74.60 HEPATIC CIRRHOSIS, UNSPECIFIED HEPATIC CIRRHOSIS TYPE, UNSPECIFIED WHETHER ASCITES PRESENT: Primary | ICD-10-CM

## 2025-08-19 PROCEDURE — 3078F DIAST BP <80 MM HG: CPT | Mod: CPTII,S$GLB,, | Performed by: NURSE PRACTITIONER

## 2025-08-19 PROCEDURE — 99496 TRANSJ CARE MGMT HIGH F2F 7D: CPT | Mod: S$GLB,,, | Performed by: NURSE PRACTITIONER

## 2025-08-19 PROCEDURE — 1159F MED LIST DOCD IN RCRD: CPT | Mod: CPTII,S$GLB,, | Performed by: NURSE PRACTITIONER

## 2025-08-19 PROCEDURE — 3074F SYST BP LT 130 MM HG: CPT | Mod: CPTII,S$GLB,, | Performed by: NURSE PRACTITIONER

## 2025-08-19 PROCEDURE — 1160F RVW MEDS BY RX/DR IN RCRD: CPT | Mod: CPTII,S$GLB,, | Performed by: NURSE PRACTITIONER

## 2025-08-19 PROCEDURE — 1111F DSCHRG MED/CURRENT MED MERGE: CPT | Mod: CPTII,S$GLB,, | Performed by: NURSE PRACTITIONER

## 2025-08-21 ENCOUNTER — LAB VISIT (OUTPATIENT)
Dept: LAB | Facility: HOSPITAL | Age: 50
End: 2025-08-21
Attending: PHYSICIAN ASSISTANT
Payer: MEDICAID

## 2025-08-21 DIAGNOSIS — K74.60 HEPATIC CIRRHOSIS, UNSPECIFIED HEPATIC CIRRHOSIS TYPE, UNSPECIFIED WHETHER ASCITES PRESENT: ICD-10-CM

## 2025-08-21 LAB
ALBUMIN SERPL-MCNC: 3.2 G/DL (ref 3.5–5.2)
ALP SERPL-CCNC: 99 UNIT/L (ref 55–135)
ALT SERPL-CCNC: 13 UNIT/L (ref 10–44)
ANION GAP (SMH): 6 MMOL/L (ref 8–16)
AST SERPL-CCNC: 24 UNIT/L (ref 10–40)
BILIRUB SERPL-MCNC: 1.8 MG/DL (ref 0.1–1)
BUN SERPL-MCNC: 5 MG/DL (ref 6–20)
CALCIUM SERPL-MCNC: 8.6 MG/DL (ref 8.7–10.5)
CHLORIDE SERPL-SCNC: 104 MMOL/L (ref 95–110)
CO2 SERPL-SCNC: 27 MMOL/L (ref 23–29)
CREAT SERPL-MCNC: 0.7 MG/DL (ref 0.5–1.4)
GFR SERPLBLD CREATININE-BSD FMLA CKD-EPI: >60 ML/MIN/1.73/M2
GLUCOSE SERPL-MCNC: 93 MG/DL (ref 70–110)
INR PPP: 1.2 (ref 0.8–1.2)
POTASSIUM SERPL-SCNC: 3.4 MMOL/L (ref 3.5–5.1)
PROT SERPL-MCNC: 6.4 GM/DL (ref 6–8.4)
PROTHROMBIN TIME: 13.3 SECONDS (ref 9–12.5)
SODIUM SERPL-SCNC: 137 MMOL/L (ref 136–145)

## 2025-08-21 PROCEDURE — 82040 ASSAY OF SERUM ALBUMIN: CPT

## 2025-08-21 PROCEDURE — 85610 PROTHROMBIN TIME: CPT

## 2025-08-21 PROCEDURE — 82105 ALPHA-FETOPROTEIN SERUM: CPT

## 2025-08-21 PROCEDURE — 36415 COLL VENOUS BLD VENIPUNCTURE: CPT

## 2025-08-22 LAB — AFP-TM SERPL-MCNC: 4.2 NG/ML (ref 0–8.4)

## 2025-08-26 ENCOUNTER — OFFICE VISIT (OUTPATIENT)
Facility: CLINIC | Age: 50
End: 2025-08-26
Payer: MEDICAID

## 2025-08-26 VITALS — WEIGHT: 207.13 LBS | BODY MASS INDEX: 30.68 KG/M2 | HEIGHT: 69 IN

## 2025-08-26 DIAGNOSIS — K76.82 HEPATIC ENCEPHALOPATHY: ICD-10-CM

## 2025-08-26 DIAGNOSIS — I81 PVT (PORTAL VEIN THROMBOSIS): ICD-10-CM

## 2025-08-26 DIAGNOSIS — R60.0 LEG EDEMA: ICD-10-CM

## 2025-08-26 DIAGNOSIS — K76.6 PORTAL VENOUS HYPERTENSION: ICD-10-CM

## 2025-08-26 DIAGNOSIS — K74.60 HEPATIC CIRRHOSIS, UNSPECIFIED HEPATIC CIRRHOSIS TYPE, UNSPECIFIED WHETHER ASCITES PRESENT: Primary | ICD-10-CM

## 2025-08-26 PROCEDURE — 99215 OFFICE O/P EST HI 40 MIN: CPT | Mod: S$PBB,,, | Performed by: PHYSICIAN ASSISTANT

## 2025-08-26 PROCEDURE — 99213 OFFICE O/P EST LOW 20 MIN: CPT | Mod: PBBFAC,PN | Performed by: PHYSICIAN ASSISTANT

## 2025-08-26 PROCEDURE — 3008F BODY MASS INDEX DOCD: CPT | Mod: CPTII,,, | Performed by: PHYSICIAN ASSISTANT

## 2025-08-26 PROCEDURE — 1160F RVW MEDS BY RX/DR IN RCRD: CPT | Mod: CPTII,,, | Performed by: PHYSICIAN ASSISTANT

## 2025-08-26 PROCEDURE — 1159F MED LIST DOCD IN RCRD: CPT | Mod: CPTII,,, | Performed by: PHYSICIAN ASSISTANT

## 2025-08-26 PROCEDURE — 99999 PR PBB SHADOW E&M-EST. PATIENT-LVL III: CPT | Mod: PBBFAC,,, | Performed by: PHYSICIAN ASSISTANT

## 2025-08-26 PROCEDURE — 1111F DSCHRG MED/CURRENT MED MERGE: CPT | Mod: CPTII,,, | Performed by: PHYSICIAN ASSISTANT

## 2025-08-26 RX ORDER — SPIRONOLACTONE 100 MG/1
200 TABLET, FILM COATED ORAL DAILY
Start: 2025-08-26

## 2025-08-26 RX ORDER — FUROSEMIDE 40 MG/1
80 TABLET ORAL DAILY
Start: 2025-08-26

## 2025-08-26 RX ORDER — LACTULOSE 10 G/15ML
SOLUTION ORAL; RECTAL
COMMUNITY
Start: 2025-08-16

## 2025-09-02 ENCOUNTER — TELEPHONE (OUTPATIENT)
Dept: HEPATOLOGY | Facility: CLINIC | Age: 50
End: 2025-09-02
Payer: MEDICAID

## 2025-09-02 ENCOUNTER — LAB VISIT (OUTPATIENT)
Dept: LAB | Facility: HOSPITAL | Age: 50
End: 2025-09-02
Attending: PHYSICIAN ASSISTANT
Payer: MEDICAID

## 2025-09-02 DIAGNOSIS — K74.60 HEPATIC CIRRHOSIS, UNSPECIFIED HEPATIC CIRRHOSIS TYPE, UNSPECIFIED WHETHER ASCITES PRESENT: ICD-10-CM

## 2025-09-02 DIAGNOSIS — K74.60 HEPATIC CIRRHOSIS, UNSPECIFIED HEPATIC CIRRHOSIS TYPE, UNSPECIFIED WHETHER ASCITES PRESENT: Primary | ICD-10-CM

## 2025-09-02 LAB
ALBUMIN SERPL-MCNC: 3.4 G/DL (ref 3.5–5.2)
ALP SERPL-CCNC: 115 UNIT/L (ref 55–135)
ALT SERPL-CCNC: 10 UNIT/L (ref 10–44)
ANION GAP (SMH): 7 MMOL/L (ref 8–16)
AST SERPL-CCNC: 22 UNIT/L (ref 10–40)
BILIRUB SERPL-MCNC: 2.3 MG/DL (ref 0.1–1)
BUN SERPL-MCNC: 7 MG/DL (ref 6–20)
CALCIUM SERPL-MCNC: 8.7 MG/DL (ref 8.7–10.5)
CHLORIDE SERPL-SCNC: 104 MMOL/L (ref 95–110)
CO2 SERPL-SCNC: 27 MMOL/L (ref 23–29)
CREAT SERPL-MCNC: 0.8 MG/DL (ref 0.5–1.4)
ERYTHROCYTE [DISTWIDTH] IN BLOOD BY AUTOMATED COUNT: 14.9 % (ref 11.5–14.5)
GFR SERPLBLD CREATININE-BSD FMLA CKD-EPI: >60 ML/MIN/1.73/M2
GLUCOSE SERPL-MCNC: 114 MG/DL (ref 70–110)
HCT VFR BLD AUTO: 38.2 % (ref 40–54)
HGB BLD-MCNC: 13.2 GM/DL (ref 14–18)
INR PPP: 1.2 (ref 0.8–1.2)
MCH RBC QN AUTO: 32.1 PG (ref 27–31)
MCHC RBC AUTO-ENTMCNC: 34.6 G/DL (ref 32–36)
MCV RBC AUTO: 93 FL (ref 82–98)
PLATELET # BLD AUTO: 107 K/UL (ref 150–450)
PMV BLD AUTO: 10.3 FL (ref 9.2–12.9)
POTASSIUM SERPL-SCNC: 3.4 MMOL/L (ref 3.5–5.1)
PROT SERPL-MCNC: 6.9 GM/DL (ref 6–8.4)
PROTHROMBIN TIME: 12.7 SECONDS (ref 9–12.5)
RBC # BLD AUTO: 4.11 M/UL (ref 4.6–6.2)
SODIUM SERPL-SCNC: 138 MMOL/L (ref 136–145)
WBC # BLD AUTO: 5.97 K/UL (ref 3.9–12.7)

## 2025-09-02 PROCEDURE — 85027 COMPLETE CBC AUTOMATED: CPT

## 2025-09-02 PROCEDURE — 85610 PROTHROMBIN TIME: CPT

## 2025-09-02 PROCEDURE — 84450 TRANSFERASE (AST) (SGOT): CPT

## 2025-09-02 PROCEDURE — 36415 COLL VENOUS BLD VENIPUNCTURE: CPT

## 2025-09-02 PROCEDURE — 82105 ALPHA-FETOPROTEIN SERUM: CPT

## 2025-09-02 RX ORDER — FUROSEMIDE 40 MG/1
80 TABLET ORAL DAILY
Qty: 60 TABLET | Refills: 5 | Status: SHIPPED | OUTPATIENT
Start: 2025-09-02

## 2025-09-02 RX ORDER — SPIRONOLACTONE 100 MG/1
200 TABLET, FILM COATED ORAL DAILY
Qty: 60 TABLET | Refills: 5 | Status: SHIPPED | OUTPATIENT
Start: 2025-09-02

## 2025-09-03 LAB — AFP-TM SERPL-MCNC: 5 NG/ML (ref 0–8.4)

## 2025-09-05 ENCOUNTER — TELEPHONE (OUTPATIENT)
Dept: HEPATOLOGY | Facility: CLINIC | Age: 50
End: 2025-09-05
Payer: MEDICAID

## 2025-09-05 DIAGNOSIS — K74.60 HEPATIC CIRRHOSIS, UNSPECIFIED HEPATIC CIRRHOSIS TYPE, UNSPECIFIED WHETHER ASCITES PRESENT: Primary | ICD-10-CM
